# Patient Record
Sex: FEMALE | Race: WHITE | NOT HISPANIC OR LATINO | URBAN - METROPOLITAN AREA
[De-identification: names, ages, dates, MRNs, and addresses within clinical notes are randomized per-mention and may not be internally consistent; named-entity substitution may affect disease eponyms.]

---

## 2018-05-01 ENCOUNTER — EMERGENCY (EMERGENCY)
Facility: HOSPITAL | Age: 83
LOS: 1 days | Discharge: ANOTHER HOSPITAL | End: 2018-05-01
Attending: EMERGENCY MEDICINE
Payer: MEDICARE

## 2018-05-01 VITALS
RESPIRATION RATE: 18 BRPM | OXYGEN SATURATION: 96 % | TEMPERATURE: 98 F | HEART RATE: 68 BPM | WEIGHT: 130.07 LBS | HEIGHT: 64 IN | DIASTOLIC BLOOD PRESSURE: 84 MMHG | SYSTOLIC BLOOD PRESSURE: 160 MMHG

## 2018-05-01 VITALS
HEART RATE: 81 BPM | OXYGEN SATURATION: 95 % | DIASTOLIC BLOOD PRESSURE: 68 MMHG | RESPIRATION RATE: 19 BRPM | TEMPERATURE: 98 F | SYSTOLIC BLOOD PRESSURE: 184 MMHG

## 2018-05-01 LAB
ALBUMIN SERPL ELPH-MCNC: 3.2 G/DL — LOW (ref 3.5–5)
ALP SERPL-CCNC: 51 U/L — SIGNIFICANT CHANGE UP (ref 40–120)
ALT FLD-CCNC: 19 U/L DA — SIGNIFICANT CHANGE UP (ref 10–60)
ANION GAP SERPL CALC-SCNC: 9 MMOL/L — SIGNIFICANT CHANGE UP (ref 5–17)
AST SERPL-CCNC: 26 U/L — SIGNIFICANT CHANGE UP (ref 10–40)
BASOPHILS # BLD AUTO: 0 K/UL — SIGNIFICANT CHANGE UP (ref 0–0.2)
BASOPHILS NFR BLD AUTO: 0.9 % — SIGNIFICANT CHANGE UP (ref 0–2)
BILIRUB SERPL-MCNC: 0.6 MG/DL — SIGNIFICANT CHANGE UP (ref 0.2–1.2)
BUN SERPL-MCNC: 21 MG/DL — HIGH (ref 7–18)
CALCIUM SERPL-MCNC: 8.5 MG/DL — SIGNIFICANT CHANGE UP (ref 8.4–10.5)
CHLORIDE SERPL-SCNC: 109 MMOL/L — HIGH (ref 96–108)
CO2 SERPL-SCNC: 27 MMOL/L — SIGNIFICANT CHANGE UP (ref 22–31)
CREAT SERPL-MCNC: 0.96 MG/DL — SIGNIFICANT CHANGE UP (ref 0.5–1.3)
D DIMER BLD IA.RAPID-MCNC: 461 NG/ML DDU — HIGH
EOSINOPHIL # BLD AUTO: 0 K/UL — SIGNIFICANT CHANGE UP (ref 0–0.5)
EOSINOPHIL NFR BLD AUTO: 0.9 % — SIGNIFICANT CHANGE UP (ref 0–6)
GLUCOSE SERPL-MCNC: 94 MG/DL — SIGNIFICANT CHANGE UP (ref 70–99)
HCT VFR BLD CALC: 39.2 % — SIGNIFICANT CHANGE UP (ref 34.5–45)
HGB BLD-MCNC: 12 G/DL — SIGNIFICANT CHANGE UP (ref 11.5–15.5)
LYMPHOCYTES # BLD AUTO: 1.5 K/UL — SIGNIFICANT CHANGE UP (ref 1–3.3)
LYMPHOCYTES # BLD AUTO: 25.8 % — SIGNIFICANT CHANGE UP (ref 13–44)
MCHC RBC-ENTMCNC: 28.1 PG — SIGNIFICANT CHANGE UP (ref 27–34)
MCHC RBC-ENTMCNC: 30.7 GM/DL — LOW (ref 32–36)
MCV RBC AUTO: 91.5 FL — SIGNIFICANT CHANGE UP (ref 80–100)
MONOCYTES # BLD AUTO: 0.4 K/UL — SIGNIFICANT CHANGE UP (ref 0–0.9)
MONOCYTES NFR BLD AUTO: 6.9 % — SIGNIFICANT CHANGE UP (ref 2–14)
NEUTROPHILS # BLD AUTO: 3.7 K/UL — SIGNIFICANT CHANGE UP (ref 1.8–7.4)
NEUTROPHILS NFR BLD AUTO: 65.6 % — SIGNIFICANT CHANGE UP (ref 43–77)
PLATELET # BLD AUTO: 112 K/UL — LOW (ref 150–400)
POTASSIUM SERPL-MCNC: 3.7 MMOL/L — SIGNIFICANT CHANGE UP (ref 3.5–5.3)
POTASSIUM SERPL-SCNC: 3.7 MMOL/L — SIGNIFICANT CHANGE UP (ref 3.5–5.3)
PROT SERPL-MCNC: 6.7 G/DL — SIGNIFICANT CHANGE UP (ref 6–8.3)
RBC # BLD: 4.28 M/UL — SIGNIFICANT CHANGE UP (ref 3.8–5.2)
RBC # FLD: 13.3 % — SIGNIFICANT CHANGE UP (ref 10.3–14.5)
SODIUM SERPL-SCNC: 145 MMOL/L — SIGNIFICANT CHANGE UP (ref 135–145)
TROPONIN I SERPL-MCNC: 0.29 NG/ML — HIGH (ref 0–0.04)
WBC # BLD: 5.6 K/UL — SIGNIFICANT CHANGE UP (ref 3.8–10.5)
WBC # FLD AUTO: 5.6 K/UL — SIGNIFICANT CHANGE UP (ref 3.8–10.5)

## 2018-05-01 PROCEDURE — 85027 COMPLETE CBC AUTOMATED: CPT

## 2018-05-01 PROCEDURE — 71275 CT ANGIOGRAPHY CHEST: CPT | Mod: 26

## 2018-05-01 PROCEDURE — 84484 ASSAY OF TROPONIN QUANT: CPT

## 2018-05-01 PROCEDURE — 99285 EMERGENCY DEPT VISIT HI MDM: CPT

## 2018-05-01 PROCEDURE — 85379 FIBRIN DEGRADATION QUANT: CPT

## 2018-05-01 PROCEDURE — 80053 COMPREHEN METABOLIC PANEL: CPT

## 2018-05-01 PROCEDURE — 96372 THER/PROPH/DIAG INJ SC/IM: CPT

## 2018-05-01 PROCEDURE — 99285 EMERGENCY DEPT VISIT HI MDM: CPT | Mod: 25

## 2018-05-01 PROCEDURE — 71275 CT ANGIOGRAPHY CHEST: CPT

## 2018-05-01 PROCEDURE — 93005 ELECTROCARDIOGRAM TRACING: CPT

## 2018-05-01 RX ORDER — ENOXAPARIN SODIUM 100 MG/ML
60 INJECTION SUBCUTANEOUS ONCE
Qty: 0 | Refills: 0 | Status: COMPLETED | OUTPATIENT
Start: 2018-05-01 | End: 2018-05-01

## 2018-05-01 RX ORDER — ASPIRIN/CALCIUM CARB/MAGNESIUM 324 MG
81 TABLET ORAL DAILY
Qty: 0 | Refills: 0 | Status: DISCONTINUED | OUTPATIENT
Start: 2018-05-01 | End: 2018-05-05

## 2018-05-01 RX ADMIN — Medication 81 MILLIGRAM(S): at 20:47

## 2018-05-01 RX ADMIN — ENOXAPARIN SODIUM 60 MILLIGRAM(S): 100 INJECTION SUBCUTANEOUS at 21:33

## 2018-05-01 NOTE — ED PROVIDER NOTE - OBJECTIVE STATEMENT
89 y/o F pt w/ PMHx of HTN, aortic stenosis, viral regurgitation and no significant PSHx presents to ED c/o an episode of chest pain which radiated to the back w/ nausea that lasted about a half an hour. Pt states that her sx occurred when she was on a plane coming home. Pt notes that her sx resolved spontaneously w/ oxygen and aspirin. Pt denies SOB or any other complaints. NKDA.

## 2018-05-01 NOTE — ED PROVIDER NOTE - CARE PLAN
Principal Discharge DX:	Non-ST elevation myocardial infarction (NSTEMI), subendocardial infarction, initial episode of care

## 2018-05-01 NOTE — ED PROVIDER NOTE - TOBACCO USE
Subjective:       Patient ID: Diana Sellers is a 56 y.o. female.    Chief Complaint: No chief complaint on file.    Diagnosis:  Lung nodule     Pre-operative therapy: N/A    Procedure(s) and date(s): 11/30/17- Left VATS for left upper lobe wedge resection     Pathology:  1. Left upper lobe wedge-   Negative for neoplasm  Necrotic mass with surrounding granulomatous and giant cell inflammation  No fungus (GMS stain)  No definite acid fast organisms (AFB stain)  Correlate with concurrently submitted microbiology cultures  2. Level V LN  Benign reactive lymph node     Post-operative therapy: N/As    HPI     56 year old female returns for follow up s/p left VATS left upper lobe wedge biopsy. Post-op course unremarkable. Pathology benign. Today she reports feeling well. Pain well controlled with NSAIDS. She is working on increasing exercise endurance. Denies fever, chills. SOB, cough, CP, N/V or changes in bowel and bladder functioning.     Review of Systems   Constitutional: Positive for fatigue. Negative for activity change, appetite change and fever.   HENT: Negative.  Negative for trouble swallowing and voice change.    Eyes: Negative.    Respiratory: Positive for cough. Negative for chest tightness, shortness of breath and wheezing.    Cardiovascular: Negative.    Gastrointestinal: Negative.    Endocrine: Negative.    Genitourinary: Negative.    Musculoskeletal: Negative.    Skin: Negative.    Allergic/Immunologic: Negative.    Neurological: Negative.    Hematological: Negative.    Psychiatric/Behavioral: Negative.            Objective:      Physical Exam   Constitutional: She is oriented to person, place, and time. She appears well-developed and well-nourished.   HENT:   Head: Normocephalic and atraumatic.   Mouth/Throat: Oropharynx is clear and moist.   Eyes: EOM are normal. Pupils are equal, round, and reactive to light.   Neck: Normal range of motion. Neck supple. No tracheal deviation present.    Cardiovascular: Normal rate, regular rhythm, normal heart sounds and intact distal pulses.    Pulmonary/Chest: Effort normal and breath sounds normal. She exhibits no tenderness.   Incisions well healed. Chest tube site with mild erythema, no drainage.    Abdominal: Soft. Bowel sounds are normal. She exhibits no distension.   Musculoskeletal: Normal range of motion. She exhibits no edema.   Lymphadenopathy:     She has no cervical adenopathy.   Neurological: She is alert and oriented to person, place, and time.   Skin: Skin is warm and dry.   Psychiatric: She has a normal mood and affect.   Vitals reviewed.        CXR-   Reviewed. Anterior air fluid level stable.    Assessment:       56 year old female returns for follow up s/p left VATS left upper lobe wedge biopsy.     Plan:       RTC in 6 months with noncontrast chest CT for surveillance. If stable at that time, she will not require any further follow up.     ATTENDING ATTESTATION:    I evaluated the patient and I agree with the assessment and plan   Never smoker

## 2018-05-02 ENCOUNTER — INPATIENT (INPATIENT)
Facility: HOSPITAL | Age: 83
LOS: 2 days | Discharge: HOME HEALTH | DRG: 273 | End: 2018-05-05
Attending: INTERNAL MEDICINE | Admitting: INTERNAL MEDICINE
Payer: MEDICARE

## 2018-05-02 VITALS
RESPIRATION RATE: 20 BRPM | OXYGEN SATURATION: 100 % | DIASTOLIC BLOOD PRESSURE: 88 MMHG | HEIGHT: 60 IN | WEIGHT: 133.6 LBS | TEMPERATURE: 98 F | HEART RATE: 74 BPM | SYSTOLIC BLOOD PRESSURE: 191 MMHG

## 2018-05-02 DIAGNOSIS — F41.9 ANXIETY DISORDER, UNSPECIFIED: ICD-10-CM

## 2018-05-02 DIAGNOSIS — Z90.710 ACQUIRED ABSENCE OF BOTH CERVIX AND UTERUS: Chronic | ICD-10-CM

## 2018-05-02 DIAGNOSIS — I25.10 ATHEROSCLEROTIC HEART DISEASE OF NATIVE CORONARY ARTERY WITHOUT ANGINA PECTORIS: ICD-10-CM

## 2018-05-02 DIAGNOSIS — Z22.322 CARRIER OR SUSPECTED CARRIER OF METHICILLIN RESISTANT STAPHYLOCOCCUS AUREUS: ICD-10-CM

## 2018-05-02 DIAGNOSIS — K59.00 CONSTIPATION, UNSPECIFIED: ICD-10-CM

## 2018-05-02 DIAGNOSIS — I21.4 NON-ST ELEVATION (NSTEMI) MYOCARDIAL INFARCTION: ICD-10-CM

## 2018-05-02 DIAGNOSIS — I35.0 NONRHEUMATIC AORTIC (VALVE) STENOSIS: ICD-10-CM

## 2018-05-02 DIAGNOSIS — R63.8 OTHER SYMPTOMS AND SIGNS CONCERNING FOOD AND FLUID INTAKE: ICD-10-CM

## 2018-05-02 DIAGNOSIS — E03.9 HYPOTHYROIDISM, UNSPECIFIED: ICD-10-CM

## 2018-05-02 DIAGNOSIS — Z95.5 PRESENCE OF CORONARY ANGIOPLASTY IMPLANT AND GRAFT: Chronic | ICD-10-CM

## 2018-05-02 DIAGNOSIS — Z98.890 OTHER SPECIFIED POSTPROCEDURAL STATES: Chronic | ICD-10-CM

## 2018-05-02 DIAGNOSIS — K50.90 CROHN'S DISEASE, UNSPECIFIED, WITHOUT COMPLICATIONS: ICD-10-CM

## 2018-05-02 LAB
ALBUMIN SERPL ELPH-MCNC: 3.8 G/DL — SIGNIFICANT CHANGE UP (ref 3.3–5)
ALP SERPL-CCNC: 46 U/L — SIGNIFICANT CHANGE UP (ref 40–120)
ALT FLD-CCNC: 15 U/L — SIGNIFICANT CHANGE UP (ref 10–45)
ANION GAP SERPL CALC-SCNC: 11 MMOL/L — SIGNIFICANT CHANGE UP (ref 5–17)
ANION GAP SERPL CALC-SCNC: 12 MMOL/L — SIGNIFICANT CHANGE UP (ref 5–17)
APTT BLD: 195.6 SEC — CRITICAL HIGH (ref 27.5–37.4)
APTT BLD: 43.7 SEC — HIGH (ref 27.5–37.4)
APTT BLD: 78.7 SEC — HIGH (ref 27.5–37.4)
APTT BLD: 94.6 SEC — HIGH (ref 27.5–37.4)
AST SERPL-CCNC: 26 U/L — SIGNIFICANT CHANGE UP (ref 10–40)
BILIRUB SERPL-MCNC: 0.7 MG/DL — SIGNIFICANT CHANGE UP (ref 0.2–1.2)
BLD GP AB SCN SERPL QL: NEGATIVE — SIGNIFICANT CHANGE UP
BUN SERPL-MCNC: 13 MG/DL — SIGNIFICANT CHANGE UP (ref 7–23)
BUN SERPL-MCNC: 17 MG/DL — SIGNIFICANT CHANGE UP (ref 7–23)
CALCIUM SERPL-MCNC: 9 MG/DL — SIGNIFICANT CHANGE UP (ref 8.4–10.5)
CALCIUM SERPL-MCNC: 9.1 MG/DL — SIGNIFICANT CHANGE UP (ref 8.4–10.5)
CHLORIDE SERPL-SCNC: 100 MMOL/L — SIGNIFICANT CHANGE UP (ref 96–108)
CHLORIDE SERPL-SCNC: 101 MMOL/L — SIGNIFICANT CHANGE UP (ref 96–108)
CHOLEST SERPL-MCNC: 142 MG/DL — SIGNIFICANT CHANGE UP (ref 10–199)
CK MB CFR SERPL CALC: 3.7 NG/ML — SIGNIFICANT CHANGE UP (ref 0–6.7)
CO2 SERPL-SCNC: 25 MMOL/L — SIGNIFICANT CHANGE UP (ref 22–31)
CO2 SERPL-SCNC: 27 MMOL/L — SIGNIFICANT CHANGE UP (ref 22–31)
CREAT SERPL-MCNC: 0.84 MG/DL — SIGNIFICANT CHANGE UP (ref 0.5–1.3)
CREAT SERPL-MCNC: 0.9 MG/DL — SIGNIFICANT CHANGE UP (ref 0.5–1.3)
GLUCOSE SERPL-MCNC: 87 MG/DL — SIGNIFICANT CHANGE UP (ref 70–99)
GLUCOSE SERPL-MCNC: 91 MG/DL — SIGNIFICANT CHANGE UP (ref 70–99)
HBA1C BLD-MCNC: 4.9 % — SIGNIFICANT CHANGE UP (ref 4–5.6)
HCT VFR BLD CALC: 37.4 % — SIGNIFICANT CHANGE UP (ref 34.5–45)
HCT VFR BLD CALC: 41.2 % — SIGNIFICANT CHANGE UP (ref 34.5–45)
HDLC SERPL-MCNC: 79 MG/DL — SIGNIFICANT CHANGE UP (ref 40–125)
HGB BLD-MCNC: 12.2 G/DL — SIGNIFICANT CHANGE UP (ref 11.5–15.5)
HGB BLD-MCNC: 13.1 G/DL — SIGNIFICANT CHANGE UP (ref 11.5–15.5)
INR BLD: 1 — SIGNIFICANT CHANGE UP (ref 0.88–1.16)
LIPID PNL WITH DIRECT LDL SERPL: 52 MG/DL — SIGNIFICANT CHANGE UP
MAGNESIUM SERPL-MCNC: 1.8 MG/DL — SIGNIFICANT CHANGE UP (ref 1.6–2.6)
MAGNESIUM SERPL-MCNC: 1.9 MG/DL — SIGNIFICANT CHANGE UP (ref 1.6–2.6)
MCHC RBC-ENTMCNC: 28.7 PG — SIGNIFICANT CHANGE UP (ref 27–34)
MCHC RBC-ENTMCNC: 29.1 PG — SIGNIFICANT CHANGE UP (ref 27–34)
MCHC RBC-ENTMCNC: 31.8 G/DL — LOW (ref 32–36)
MCHC RBC-ENTMCNC: 32.6 G/DL — SIGNIFICANT CHANGE UP (ref 32–36)
MCV RBC AUTO: 89.3 FL — SIGNIFICANT CHANGE UP (ref 80–100)
MCV RBC AUTO: 90.2 FL — SIGNIFICANT CHANGE UP (ref 80–100)
PHOSPHATE SERPL-MCNC: 3.5 MG/DL — SIGNIFICANT CHANGE UP (ref 2.5–4.5)
PLATELET # BLD AUTO: 116 K/UL — LOW (ref 150–400)
PLATELET # BLD AUTO: 136 K/UL — LOW (ref 150–400)
POTASSIUM SERPL-MCNC: 3.6 MMOL/L — SIGNIFICANT CHANGE UP (ref 3.5–5.3)
POTASSIUM SERPL-MCNC: 4 MMOL/L — SIGNIFICANT CHANGE UP (ref 3.5–5.3)
POTASSIUM SERPL-SCNC: 3.6 MMOL/L — SIGNIFICANT CHANGE UP (ref 3.5–5.3)
POTASSIUM SERPL-SCNC: 4 MMOL/L — SIGNIFICANT CHANGE UP (ref 3.5–5.3)
PROT SERPL-MCNC: 7.2 G/DL — SIGNIFICANT CHANGE UP (ref 6–8.3)
PROTHROM AB SERPL-ACNC: 11.1 SEC — SIGNIFICANT CHANGE UP (ref 9.8–12.7)
RBC # BLD: 4.19 M/UL — SIGNIFICANT CHANGE UP (ref 3.8–5.2)
RBC # BLD: 4.57 M/UL — SIGNIFICANT CHANGE UP (ref 3.8–5.2)
RBC # FLD: 14 % — SIGNIFICANT CHANGE UP (ref 10.3–16.9)
RBC # FLD: 14 % — SIGNIFICANT CHANGE UP (ref 10.3–16.9)
RH IG SCN BLD-IMP: NEGATIVE — SIGNIFICANT CHANGE UP
SODIUM SERPL-SCNC: 137 MMOL/L — SIGNIFICANT CHANGE UP (ref 135–145)
SODIUM SERPL-SCNC: 139 MMOL/L — SIGNIFICANT CHANGE UP (ref 135–145)
TOTAL CHOLESTEROL/HDL RATIO MEASUREMENT: 1.8 RATIO — LOW (ref 3.3–7.1)
TRIGL SERPL-MCNC: 57 MG/DL — SIGNIFICANT CHANGE UP (ref 10–149)
TROPONIN T SERPL-MCNC: 0.01 NG/ML — SIGNIFICANT CHANGE UP (ref 0–0.01)
TROPONIN T SERPL-MCNC: 0.02 NG/ML — HIGH (ref 0–0.01)
TSH SERPL-MCNC: 0.2 UIU/ML — LOW (ref 0.35–4.94)
WBC # BLD: 6.8 K/UL — SIGNIFICANT CHANGE UP (ref 3.8–10.5)
WBC # BLD: 8.8 K/UL — SIGNIFICANT CHANGE UP (ref 3.8–10.5)
WBC # FLD AUTO: 6.8 K/UL — SIGNIFICANT CHANGE UP (ref 3.8–10.5)
WBC # FLD AUTO: 8.8 K/UL — SIGNIFICANT CHANGE UP (ref 3.8–10.5)

## 2018-05-02 PROCEDURE — 99223 1ST HOSP IP/OBS HIGH 75: CPT | Mod: 57

## 2018-05-02 PROCEDURE — 93291 INTERROG DEV EVAL SCRMS IP: CPT | Mod: 26

## 2018-05-02 PROCEDURE — 99291 CRITICAL CARE FIRST HOUR: CPT

## 2018-05-02 PROCEDURE — 71045 X-RAY EXAM CHEST 1 VIEW: CPT | Mod: 26

## 2018-05-02 PROCEDURE — 93306 TTE W/DOPPLER COMPLETE: CPT | Mod: 26

## 2018-05-02 PROCEDURE — 93880 EXTRACRANIAL BILAT STUDY: CPT | Mod: 26

## 2018-05-02 PROCEDURE — 93010 ELECTROCARDIOGRAM REPORT: CPT

## 2018-05-02 PROCEDURE — 99223 1ST HOSP IP/OBS HIGH 75: CPT

## 2018-05-02 RX ORDER — ADALIMUMAB 40MG/0.8ML
40 KIT SUBCUTANEOUS ONCE
Qty: 0 | Refills: 0 | Status: DISCONTINUED | OUTPATIENT
Start: 2018-05-02 | End: 2018-05-02

## 2018-05-02 RX ORDER — HEPARIN SODIUM 5000 [USP'U]/ML
1100 INJECTION INTRAVENOUS; SUBCUTANEOUS
Qty: 25000 | Refills: 0 | Status: DISCONTINUED | OUTPATIENT
Start: 2018-05-02 | End: 2018-05-02

## 2018-05-02 RX ORDER — ALPRAZOLAM 0.25 MG
0.12 TABLET ORAL EVERY 8 HOURS
Qty: 0 | Refills: 0 | Status: DISCONTINUED | OUTPATIENT
Start: 2018-05-02 | End: 2018-05-02

## 2018-05-02 RX ORDER — TICAGRELOR 90 MG/1
180 TABLET ORAL ONCE
Qty: 0 | Refills: 0 | Status: COMPLETED | OUTPATIENT
Start: 2018-05-02 | End: 2018-05-02

## 2018-05-02 RX ORDER — METOPROLOL TARTRATE 50 MG
12.5 TABLET ORAL EVERY 12 HOURS
Qty: 0 | Refills: 0 | Status: DISCONTINUED | OUTPATIENT
Start: 2018-05-02 | End: 2018-05-03

## 2018-05-02 RX ORDER — ATORVASTATIN CALCIUM 80 MG/1
80 TABLET, FILM COATED ORAL AT BEDTIME
Qty: 0 | Refills: 0 | Status: DISCONTINUED | OUTPATIENT
Start: 2018-05-02 | End: 2018-05-05

## 2018-05-02 RX ORDER — CHLORHEXIDINE GLUCONATE 213 G/1000ML
1 SOLUTION TOPICAL DAILY
Qty: 0 | Refills: 0 | Status: DISCONTINUED | OUTPATIENT
Start: 2018-05-02 | End: 2018-05-04

## 2018-05-02 RX ORDER — HYDRALAZINE HCL 50 MG
25 TABLET ORAL ONCE
Qty: 0 | Refills: 0 | Status: DISCONTINUED | OUTPATIENT
Start: 2018-05-02 | End: 2018-05-02

## 2018-05-02 RX ORDER — LEVOTHYROXINE SODIUM 125 MCG
88 TABLET ORAL DAILY
Qty: 0 | Refills: 0 | Status: DISCONTINUED | OUTPATIENT
Start: 2018-05-02 | End: 2018-05-05

## 2018-05-02 RX ORDER — PANTOPRAZOLE SODIUM 20 MG/1
40 TABLET, DELAYED RELEASE ORAL
Qty: 0 | Refills: 0 | Status: DISCONTINUED | OUTPATIENT
Start: 2018-05-02 | End: 2018-05-05

## 2018-05-02 RX ORDER — TICAGRELOR 90 MG/1
90 TABLET ORAL
Qty: 0 | Refills: 0 | Status: DISCONTINUED | OUTPATIENT
Start: 2018-05-02 | End: 2018-05-04

## 2018-05-02 RX ORDER — HEPARIN SODIUM 5000 [USP'U]/ML
700 INJECTION INTRAVENOUS; SUBCUTANEOUS
Qty: 25000 | Refills: 0 | Status: DISCONTINUED | OUTPATIENT
Start: 2018-05-02 | End: 2018-05-03

## 2018-05-02 RX ORDER — POLYETHYLENE GLYCOL 3350 17 G/17G
17 POWDER, FOR SOLUTION ORAL DAILY
Qty: 0 | Refills: 0 | Status: DISCONTINUED | OUTPATIENT
Start: 2018-05-02 | End: 2018-05-05

## 2018-05-02 RX ORDER — POTASSIUM CHLORIDE 20 MEQ
40 PACKET (EA) ORAL ONCE
Qty: 0 | Refills: 0 | Status: COMPLETED | OUTPATIENT
Start: 2018-05-02 | End: 2018-05-02

## 2018-05-02 RX ORDER — LANOLIN ALCOHOL/MO/W.PET/CERES
3 CREAM (GRAM) TOPICAL AT BEDTIME
Qty: 0 | Refills: 0 | Status: DISCONTINUED | OUTPATIENT
Start: 2018-05-02 | End: 2018-05-03

## 2018-05-02 RX ORDER — CHLORHEXIDINE GLUCONATE 213 G/1000ML
1 SOLUTION TOPICAL ONCE
Qty: 0 | Refills: 0 | Status: COMPLETED | OUTPATIENT
Start: 2018-05-02 | End: 2018-05-03

## 2018-05-02 RX ORDER — MINOCYCLINE HYDROCHLORIDE 45 MG/1
100 TABLET, EXTENDED RELEASE ORAL DAILY
Qty: 0 | Refills: 0 | Status: DISCONTINUED | OUTPATIENT
Start: 2018-05-02 | End: 2018-05-05

## 2018-05-02 RX ORDER — CHLORHEXIDINE GLUCONATE 213 G/1000ML
5 SOLUTION TOPICAL ONCE
Qty: 0 | Refills: 0 | Status: COMPLETED | OUTPATIENT
Start: 2018-05-02 | End: 2018-05-03

## 2018-05-02 RX ORDER — MAGNESIUM SULFATE 500 MG/ML
1 VIAL (ML) INJECTION ONCE
Qty: 0 | Refills: 0 | Status: COMPLETED | OUTPATIENT
Start: 2018-05-02 | End: 2018-05-02

## 2018-05-02 RX ORDER — ASPIRIN/CALCIUM CARB/MAGNESIUM 324 MG
81 TABLET ORAL DAILY
Qty: 0 | Refills: 0 | Status: DISCONTINUED | OUTPATIENT
Start: 2018-05-02 | End: 2018-05-05

## 2018-05-02 RX ORDER — HEPARIN SODIUM 5000 [USP'U]/ML
800 INJECTION INTRAVENOUS; SUBCUTANEOUS
Qty: 25000 | Refills: 0 | Status: DISCONTINUED | OUTPATIENT
Start: 2018-05-02 | End: 2018-05-02

## 2018-05-02 RX ORDER — MAGNESIUM SULFATE 500 MG/ML
1 VIAL (ML) INJECTION ONCE
Qty: 0 | Refills: 0 | Status: DISCONTINUED | OUTPATIENT
Start: 2018-05-02 | End: 2018-05-02

## 2018-05-02 RX ADMIN — HEPARIN SODIUM 11 UNIT(S)/HR: 5000 INJECTION INTRAVENOUS; SUBCUTANEOUS at 08:01

## 2018-05-02 RX ADMIN — ATORVASTATIN CALCIUM 80 MILLIGRAM(S): 80 TABLET, FILM COATED ORAL at 22:17

## 2018-05-02 RX ADMIN — Medication 81 MILLIGRAM(S): at 12:06

## 2018-05-02 RX ADMIN — Medication 100 GRAM(S): at 11:32

## 2018-05-02 RX ADMIN — HEPARIN SODIUM 8 UNIT(S)/HR: 5000 INJECTION INTRAVENOUS; SUBCUTANEOUS at 19:01

## 2018-05-02 RX ADMIN — Medication 12.5 MILLIGRAM(S): at 14:47

## 2018-05-02 RX ADMIN — Medication 0.12 MILLIGRAM(S): at 22:17

## 2018-05-02 RX ADMIN — Medication 0.12 MILLIGRAM(S): at 18:43

## 2018-05-02 RX ADMIN — Medication 88 MICROGRAM(S): at 07:20

## 2018-05-02 RX ADMIN — Medication 40 MILLIEQUIVALENT(S): at 08:21

## 2018-05-02 RX ADMIN — TICAGRELOR 180 MILLIGRAM(S): 90 TABLET ORAL at 03:30

## 2018-05-02 RX ADMIN — Medication 3 MILLIGRAM(S): at 22:17

## 2018-05-02 RX ADMIN — MINOCYCLINE HYDROCHLORIDE 100 MILLIGRAM(S): 45 TABLET, EXTENDED RELEASE ORAL at 12:06

## 2018-05-02 RX ADMIN — PANTOPRAZOLE SODIUM 40 MILLIGRAM(S): 20 TABLET, DELAYED RELEASE ORAL at 07:25

## 2018-05-02 RX ADMIN — Medication 12.5 MILLIGRAM(S): at 01:30

## 2018-05-02 RX ADMIN — Medication 1 TABLET(S): at 12:06

## 2018-05-02 RX ADMIN — TICAGRELOR 90 MILLIGRAM(S): 90 TABLET ORAL at 18:43

## 2018-05-02 RX ADMIN — Medication 0.12 MILLIGRAM(S): at 12:06

## 2018-05-02 NOTE — H&P ADULT - PROBLEM SELECTOR PLAN 2
s/p MAIA x3, last placed in [place] in 2012 c/b GI bleed presumably from Plavix, which was discontinued after a year. Followed by Dr. Luis Hickey in [place]   -started ASA 81mg qd, Brilinta 90mg BID, and Lipitor 80mg  -obtain collateral from Dr. Hickey [phone]

## 2018-05-02 NOTE — PROGRESS NOTE ADULT - ASSESSMENT
Assessment/Plan/Risk Stratification:  I evaluated and examined this patient and all pre-operative studies. I concur with the first surgeon that this patient's risk assessment for standard surgical AVR is EXTREME/HIGH/INTERMEDIATE and should be considered for TAVR secondary to:

## 2018-05-02 NOTE — CONSULT NOTE ADULT - ASSESSMENT
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   Patient was flying back to [place] from [place], when she reported left flank pain with fullness in her abdomen  associated w/nausea that lasted about 30 minutes. Pt was put on 2L NC and PIV placed in flight. Pt noted that her symptoms resolved w/oxygen, IVF, and aspirin 250mg. 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   Patient was flying back to [place] from [place], when she reported left flank pain with fullness in her abdomen  associated w/nausea that lasted about 30 minutes. Pt was put on 2L NC and PIV placed in flight. Pt noted that her symptoms resolved w/oxygen, IVF, and aspirin 250mg.     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   Patient was flying back to [place] from [place], when she reported left flank pain with fullness in her abdomen  associated w/nausea that lasted about 30 minutes. Pt was put on 2L NC and PIV placed in flight. Pt noted that her symptoms resolved w/oxygen, IVF, and aspirin 250mg.     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   Patient was flying back to [place] from [place], when she reported left flank pain with fullness in her abdomen  associated w/nausea that lasted about 30 minutes. Pt was put on 2L NC and PIV placed in flight. Pt noted that her symptoms resolved w/oxygen, IVF, and aspirin 250mg. Patient was given aspirin and lovenox at [facility] and CT chest was negative for PE. Patient was transferred to [facility] for management of NSTEMI where she was given Brillinta 180mg, TTE showed AS.   SHD team was consulted to evaluate for possible TAVR/BAV      Plan  AS  plan for [facility], possible PCI and BAV 5/3/18  need to complete workup-carotid duplex, PFT's, Structural CT  NPO at midnight

## 2018-05-02 NOTE — CONSULT NOTE ADULT - SUBJECTIVE AND OBJECTIVE BOX
Surgeon:Dr. Gilbert Humphrey    Requesting Physician: Dr. 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 ED c/o acute onset chest pain. History was supplemented by son a medical doctor and was at bedside. Patient was flying back to [place] from [place], when she reported left flank pain with fullness in her abdomen  associated w/nausea that lasted about 30 minutes. Pt was put on 2L NC and PIV placed in flight. Pt noted that her symptoms resolved w/oxygen, IVF, and aspirin 250mg.   Pt denied SOB, productive cough, palpitations, recent illness, hx blood clots, f/c/v/d, abdominal pain, urinary symptoms, change in medications, HA, dizziness, changes in vision. Patient was given aspirin and lovenox at [facility] and CT chest was negative for PE. Patient was transferred to [facility] for management of NSTEMI where she was given Brillinta 180mg, TTE showed AS.   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  Crohns disease  CAD (coronary artery disease)  Aortic stenosis  H/O total hysterectomy  History of bowel resection  S/P drug eluting coronary stent placement      MEDICATIONS  (STANDING):  ALPRAZolam 0.125 milliGRAM(s) Oral every 8 hours  aspirin  chewable 81 milliGRAM(s) Oral daily  atorvastatin 80 milliGRAM(s) Oral at bedtime  chlorhexidine 0.12% Liquid 5 milliLiter(s) Swish and Spit once  chlorhexidine 2% Cloths 1 Application(s) Topical daily  chlorhexidine 4% Liquid 1 Application(s) Topical once  heparin  Infusion 800 Unit(s)/Hr (8 mL/Hr) IV Continuous <Continuous>  levothyroxine 88 MICROGram(s) Oral daily  melatonin 3 milliGRAM(s) Oral at bedtime  metoprolol tartrate 12.5 milliGRAM(s) Oral every 12 hours  minocycline 100 milliGRAM(s) Oral daily  multivitamin 1 Tablet(s) Oral daily  pantoprazole    Tablet 40 milliGRAM(s) Oral before breakfast  polyethylene glycol 3350 17 Gram(s) Oral daily  ticagrelor 90 milliGRAM(s) Oral two times a day    MEDICATIONS  (PRN):      Allergies    No Known Allergies    Intolerances        SOCIAL HISTORY:  Smoker:   NO        PACK YEARS:                         WHEN QUIT?  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[employer])  Live with: Assisted living center      FAMILY HISTORY:  No pertinent family history in first degree relatives      CONSTITUTIONAL:  Fevers / chills, sweats, fatigue, weight loss, weight gain                                    NEGATIVE  NEURO:  parathesias, seizures, confusion                                                      NEGATIVE  / POSITIVE Syncope (1 mth ago), Dizziness on ambulation  EYES:  Blurry vision, discharge, pain, loss of vision                                                                         NEGATIVE  ENMT:  Difficulty hearing, vertigo, dysphagia, epistaxis, recent dental work                                     NEGATIVE  CV:  Chest pain, palpitations, DELVALLE, orthopnea                                                                                    NEGATIVE  RESPIRATORY:  Wheezing, SOB, cough / sputum, hemoptysis                                                              NEGATIVE  GI:  Nausea, vomiting, diarrhea, constipation, melena                                                                        NEGATIVE  : Hematuria, dysuria, urgency, incontinence                                                                                       NEGATIVE  MUSKULOSKELETAL:  arthritis, joint swelling, muscle weakness                                                           NEGATIVE  SKIN/BREAST:  rash, itching, hair loss, masses                                                                                            NEGATIVE  PSYCH:  depression, anxiety, suicidal ideation                                                                                             NEGATIVE  HEME/LYMPH:  bruises easily, enlarged lymph nodes, tender lymph nodes                                        NEGATIVE  ENDOCRINE:  cold intolerance, heat intolerance, polydipsia                                                                   NEGATIVE      PHYSICAL EXAM  Vital Signs Last 24 Hrs  T(C): 36.8 (02 May 2018 17:04), Max: 36.8 (02 May 2018 00:17)  T(F): 98.2 (02 May 2018 17:04), Max: 98.2 (02 May 2018 00:17)  HR: 68 (02 May 2018 20:00) (64 - 82)  BP: 114/57 (02 May 2018 20:00) (114/57 - 198/101)  BP(mean): 85 (02 May 2018 20:00) (85 - 155)  RR: 29 (02 May 2018 20:00) (16 - 29)  SpO2: 98% (02 May 2018 20:00) (95% - 100%)    CONSTITUTIONAL:                                                      NEUROLOGICAL:  Alert and oriented x3, no gross deficits appreciated                   EYES:      PERRL           ENMT:  supple neck, no lymphadenopathy  CARDIOVASCULAR:     RRR, S1S2, 3/6 murmur LSB  RESPIRATORY:  equal breath sounds, no rales, no rhonchi, no wheeze  GASTROINTESTINAL:  soft, nontender, positive bowel sounds  : JENNINGS + / -  negative  MUSKULOSKELETAL:  moves all extremities  SKIN / BREAST:  no rash seen, mild purplish bruising LLE Lateral aspect  EXTREMITIES:  1+ pitting edema  VASCULAR:    all pulses  intact (radial, femoral, DP/PT)  INCISION: healed laparotomy incision                                                                  LABS:                        12.2   8.8   )-----------( 136      ( 02 May 2018 11:42 )             37.4     05-02    137  |  100  |  13  ----------------------------<  91  4.0   |  25  |  0.84    Ca    9.0      02 May 2018 11:42  Phos  3.5     05-02  Mg     1.8     05-02    TPro  7.2  /  Alb  3.8  /  TBili  0.7  /  DBili  x   /  AST  26  /  ALT  15  /  AlkPhos  46  05-02    PT/INR - ( 02 May 2018 01:18 )   PT: 11.1 sec;   INR: 1.00          PTT - ( 02 May 2018 17:48 )  PTT:78.7 sec    CARDIAC MARKERS ( 02 May 2018 11:42 )  x     / 0.01 ng/mL / x     / x     / x      CARDIAC MARKERS ( 02 May 2018 01:18 )  x     / 0.02 ng/mL / 47 U/L / x     / 3.7 ng/mL  CARDIAC MARKERS ( 01 May 2018 19:33 )  0.293 ng/mL / x     / x     / x     / x            Thyroid Stimulating Hormone, Serum: 0.205 uIU/mL (05-02 @ 01:18)  Hemoglobin A1C, Whole Blood: 4.9 % (05-02 @ 01:16)    Serum Pro-Brain Natriuretic Peptide: 5694 pg/mL (05-02-18 @ 01:18)      RADIOLOGY & ADDITIONAL STUDIES:  CAROTID U/S:ordered    CXR:  < from: Xray Chest 1 View- PORTABLE-Urgent (05.02.18 @ 02:03) >  History: Chest pain shortness of breath    Mild hypoinflation. Prominent size heart. No focal lung infiltrate or   pleural effusion. Aortic arch vascular calcification.    < end of copied text >    CT Scan: < from: CT Angio Chest w/ IV Cont (05.01.18 @ 23:05) >  IMPRESSION:  1. No evidence of a pulmonary embolism.  2. Interstitial pulmonary edema and trace bilateral pleural effusions.  3. Age-indeterminate T4, T5 and T6 vertebral body compression fracture   deformities.    < end of copied text >      EKG:< from: 12 Lead ECG (05.02.18 @ 06:27) >  Ventricular Rate 67 BPM    Atrial Rate 67 BPM  P-R Interval 140 ms  QRS Duration 96 ms  Q-T Interval 434 ms  QTC Calculation(Bezet) 458 ms  P Axis 61 degrees  R Axis 4 degrees  T Axis 24 degrees  Diagnosis Line Normal sinus rhythm  Minimal voltage criteria for LVH, may be normal variant    < end of copied text >     YASEMIN:< from: Echocardiogram (05.02.18 @ 13:35) >  Normal left ventricular size and wall thickness.There is a septal "knuckle" with no left ventricular outflow obstructionThe left ventricular wall motion is normal.Right atrial size is normal.  The right ventricle is normal in size and function.The right ventricular systolic function is normal. Calcified valve.No aortic regurgitation noted.There is Severe aortic stenosis.The  calculated aortic valve area using the continuity equation is 0.7 cm2.The mean pressure gradient is 40 mmHg.There is severe mitral annular calcification.There is trace to mild mitral  regurgitation.There is mild mitral stenosis.The mean pressure gradient is 6 mmHg.Structurally normal tricuspid valve.There is trace tricuspid regurgitation.The pulmonary artery   systolic pressure is estimated to be 49 mmHg.No aortic root dilatation.The inferior vena cava is normal in size (<2.1 cm) with normal inspiratory collapse (>50%) consistent with normal right atrial   pressure.  There is no pericardial effusion.    < end of copied text >      Cardiac Cath: Surgeon:Dr. Gilbert Humphery    Requesting Physician: Dr. 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 ED c/o acute onset chest pain. History was supplemented by son a medical doctor and was at bedside. Patient was flying back to [place] from [place], when she reported left flank pain with fullness in her abdomen  associated w/nausea that lasted about 30 minutes. Pt was put on 2L NC and PIV placed in flight. Pt noted that her symptoms resolved w/oxygen, IVF, and aspirin 250mg.   Pt denied SOB, productive cough, palpitations, recent illness, hx blood clots, f/c/v/d, abdominal pain, urinary symptoms, change in medications, HA, dizziness, changes in vision. Patient was given aspirin and lovenox at [facility] and CT chest was negative for PE. Patient was transferred to [facility] for management of NSTEMI where she was given Brillinta 180mg, TTE showed AS.     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  Crohns disease  CAD (coronary artery disease)  Aortic stenosis  H/O total hysterectomy  History of bowel resection  S/P drug eluting coronary stent placement      MEDICATIONS  (STANDING):  ALPRAZolam 0.125 milliGRAM(s) Oral every 8 hours  aspirin  chewable 81 milliGRAM(s) Oral daily  atorvastatin 80 milliGRAM(s) Oral at bedtime  chlorhexidine 0.12% Liquid 5 milliLiter(s) Swish and Spit once  chlorhexidine 2% Cloths 1 Application(s) Topical daily  chlorhexidine 4% Liquid 1 Application(s) Topical once  heparin  Infusion 800 Unit(s)/Hr (8 mL/Hr) IV Continuous <Continuous>  levothyroxine 88 MICROGram(s) Oral daily  melatonin 3 milliGRAM(s) Oral at bedtime  metoprolol tartrate 12.5 milliGRAM(s) Oral every 12 hours  minocycline 100 milliGRAM(s) Oral daily  multivitamin 1 Tablet(s) Oral daily  pantoprazole    Tablet 40 milliGRAM(s) Oral before breakfast  polyethylene glycol 3350 17 Gram(s) Oral daily  ticagrelor 90 milliGRAM(s) Oral two times a day    MEDICATIONS  (PRN):      Allergies    No Known Allergies    Intolerances        SOCIAL HISTORY:  Smoker:   NO        PACK YEARS:                         WHEN QUIT?  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[employer])  Live with: Assisted living center      FAMILY HISTORY:  No pertinent family history in first degree relatives      CONSTITUTIONAL:  Fevers / chills, sweats, fatigue, weight loss, weight gain                                    NEGATIVE  NEURO:  parathesias, seizures, confusion                                                      NEGATIVE  / POSITIVE Syncope (1 mth ago), Dizziness on ambulation  EYES:  Blurry vision, discharge, pain, loss of vision                                                                         NEGATIVE  ENMT:  Difficulty hearing, vertigo, dysphagia, epistaxis, recent dental work                                     NEGATIVE  CV:  Chest pain, palpitations, DELVALLE, orthopnea                                                                                    NEGATIVE  RESPIRATORY:  Wheezing, SOB, cough / sputum, hemoptysis                                                              NEGATIVE  GI:  Nausea, vomiting, diarrhea, constipation, melena                                                                        NEGATIVE  : Hematuria, dysuria, urgency, incontinence                                                                                       NEGATIVE  MUSKULOSKELETAL:  arthritis, joint swelling, muscle weakness                                                           NEGATIVE  SKIN/BREAST:  rash, itching, hair loss, masses                                                                                            NEGATIVE  PSYCH:  depression, anxiety, suicidal ideation                                                                                             NEGATIVE  HEME/LYMPH:  bruises easily, enlarged lymph nodes, tender lymph nodes                                        NEGATIVE  ENDOCRINE:  cold intolerance, heat intolerance, polydipsia                                                                   NEGATIVE      PHYSICAL EXAM  Vital Signs Last 24 Hrs  T(C): 36.8 (02 May 2018 17:04), Max: 36.8 (02 May 2018 00:17)  T(F): 98.2 (02 May 2018 17:04), Max: 98.2 (02 May 2018 00:17)  HR: 68 (02 May 2018 20:00) (64 - 82)  BP: 114/57 (02 May 2018 20:00) (114/57 - 198/101)  BP(mean): 85 (02 May 2018 20:00) (85 - 155)  RR: 29 (02 May 2018 20:00) (16 - 29)  SpO2: 98% (02 May 2018 20:00) (95% - 100%)    CONSTITUTIONAL:                                                      NEUROLOGICAL:  Alert and oriented x3, no gross deficits appreciated                   EYES:      PERRL           ENMT:  supple neck, no lymphadenopathy  CARDIOVASCULAR:     RRR, S1S2, 3/6 murmur LSB  RESPIRATORY:  equal breath sounds, no rales, no rhonchi, no wheeze  GASTROINTESTINAL:  soft, nontender, positive bowel sounds  : JENNINGS + / -  negative  MUSKULOSKELETAL:  moves all extremities  SKIN / BREAST:  no rash seen, mild purplish bruising LLE Lateral aspect  EXTREMITIES:  1+ pitting edema  VASCULAR:    all pulses  intact (radial, femoral, DP/PT)  INCISION: healed laparotomy incision                                                                  LABS:                        12.2   8.8   )-----------( 136      ( 02 May 2018 11:42 )             37.4     05-02    137  |  100  |  13  ----------------------------<  91  4.0   |  25  |  0.84    Ca    9.0      02 May 2018 11:42  Phos  3.5     05-02  Mg     1.8     05-02    TPro  7.2  /  Alb  3.8  /  TBili  0.7  /  DBili  x   /  AST  26  /  ALT  15  /  AlkPhos  46  05-02    PT/INR - ( 02 May 2018 01:18 )   PT: 11.1 sec;   INR: 1.00          PTT - ( 02 May 2018 17:48 )  PTT:78.7 sec    CARDIAC MARKERS ( 02 May 2018 11:42 )  x     / 0.01 ng/mL / x     / x     / x      CARDIAC MARKERS ( 02 May 2018 01:18 )  x     / 0.02 ng/mL / 47 U/L / x     / 3.7 ng/mL  CARDIAC MARKERS ( 01 May 2018 19:33 )  0.293 ng/mL / x     / x     / x     / x            Thyroid Stimulating Hormone, Serum: 0.205 uIU/mL (05-02 @ 01:18)  Hemoglobin A1C, Whole Blood: 4.9 % (05-02 @ 01:16)    Serum Pro-Brain Natriuretic Peptide: 5694 pg/mL (05-02-18 @ 01:18)      RADIOLOGY & ADDITIONAL STUDIES:  CAROTID U/S:ordered    CXR:  < from: Xray Chest 1 View- PORTABLE-Urgent (05.02.18 @ 02:03) >  History: Chest pain shortness of breath    Mild hypoinflation. Prominent size heart. No focal lung infiltrate or   pleural effusion. Aortic arch vascular calcification.    < end of copied text >    CT Scan: < from: CT Angio Chest w/ IV Cont (05.01.18 @ 23:05) >  IMPRESSION:  1. No evidence of a pulmonary embolism.  2. Interstitial pulmonary edema and trace bilateral pleural effusions.  3. Age-indeterminate T4, T5 and T6 vertebral body compression fracture   deformities.    < end of copied text >      EKG:< from: 12 Lead ECG (05.02.18 @ 06:27) >  Ventricular Rate 67 BPM    Atrial Rate 67 BPM  P-R Interval 140 ms  QRS Duration 96 ms  Q-T Interval 434 ms  QTC Calculation(Bezet) 458 ms  P Axis 61 degrees  R Axis 4 degrees  T Axis 24 degrees  Diagnosis Line Normal sinus rhythm  Minimal voltage criteria for LVH, may be normal variant    < end of copied text >     YASEMIN:< from: Echocardiogram (05.02.18 @ 13:35) >  Normal left ventricular size and wall thickness.There is a septal "knuckle" with no left ventricular outflow obstructionThe left ventricular wall motion is normal.Right atrial size is normal.  The right ventricle is normal in size and function.The right ventricular systolic function is normal. Calcified valve.No aortic regurgitation noted.There is Severe aortic stenosis.The  calculated aortic valve area using the continuity equation is 0.7 cm2.The mean pressure gradient is 40 mmHg.There is severe mitral annular calcification.There is trace to mild mitral  regurgitation.There is mild mitral stenosis.The mean pressure gradient is 6 mmHg.Structurally normal tricuspid valve.There is trace tricuspid regurgitation.The pulmonary artery   systolic pressure is estimated to be 49 mmHg.No aortic root dilatation.The inferior vena cava is normal in size (<2.1 cm) with normal inspiratory collapse (>50%) consistent with normal right atrial   pressure.  There is no pericardial effusion.    < end of copied text >      Cardiac Cath:

## 2018-05-02 NOTE — H&P ADULT - PMH
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

## 2018-05-02 NOTE — H&P ADULT - NSHPLABSRESULTS_GEN_ALL_CORE
LABS:                         13.1   6.8   )-----------( 116      ( 02 May 2018 01:17 )             41.2     05-02    139  |  101  |  17  ----------------------------<  87  3.6   |  27  |  0.90    Ca    9.1      02 May 2018 01:18  Phos  3.5     05-02  Mg     1.9     05-02    TPro  7.2  /  Alb  3.8  /  TBili  0.7  /  DBili  x   /  AST  26  /  ALT  15  /  AlkPhos  46  05-02    PT/INR - ( 02 May 2018 01:18 )   PT: 11.1 sec;   INR: 1.00          PTT - ( 02 May 2018 01:18 )  PTT:43.7 sec    CARDIAC MARKERS ( 02 May 2018 01:18 )  x     / 0.02 ng/mL / 47 U/L / x     / 3.7 ng/mL  CARDIAC MARKERS ( 01 May 2018 19:33 )  0.293 ng/mL / x     / x     / x     / x                RADIOLOGY, EKG & ADDITIONAL TESTS: Reviewed.   < from: CT Angio Chest w/ IV Cont (05.01.18 @ 23:05) >    IMPRESSION:  1. No evidence of a pulmonary embolism.  2. Interstitial pulmonary edema and trace bilateral pleural effusions.  3. Age-indeterminate T4, T5 and T6 vertebral body compression fracture   deformities.    < end of copied text >

## 2018-05-02 NOTE — PROGRESS NOTE ADULT - SUBJECTIVE AND OBJECTIVE BOX
EP was asked to check the patient's [employer] ILR for events.    The battery reached end of service (EOS) December 29, 2017. Recommend having the patient's EP remove the device.     Episodes as of EOS date: NONE    No tachy  No joshua  No pause   No AT/AF

## 2018-05-02 NOTE — H&P ADULT - NSHPPHYSICALEXAM_GEN_ALL_CORE
VITAL SIGNS:  T(C): 36.8 (05-02-18 @ 00:17), Max: 36.9 (05-01-18 @ 18:35)  T(F): 98.2 (05-02-18 @ 00:17), Max: 98.4 (05-01-18 @ 18:35)  HR: 76 (05-02-18 @ 03:00) (65 - 81)  BP: 183/93 (05-02-18 @ 03:00) (160/84 - 198/101)  BP(mean): 138 (05-02-18 @ 03:00) (113 - 155)  RR: 18 (05-02-18 @ 02:30) (18 - 20)  SpO2: 100% (05-02-18 @ 03:00) (95% - 100%)      PHYSICAL EXAM:    Constitutional: WDWN resting comfortably in bed; NAD  Head: NC/AT  Eyes: PERRL, EOMI, anicteric sclera  ENT: no nasal discharge; uvula midline, no oropharyngeal erythema or exudates; MMM  Neck: supple; no JVD or thyromegaly  Respiratory: trace bibasilar crackles; no W/R/R, no retractions  Cardiac: +S1/S2; RRR; 3/6 systolic murmur best heard at R UICB w/radiation to carotids   Gastrointestinal: soft, NT/ND; no rebound or guarding; +BSx4  Genitourinary: no yuen  Back: spine midline, no bony tenderness or step-offs; no CVAT B/L  Extremities: WWP, no clubbing or cyanosis; trace non pitting edema of L LE to knee  Musculoskeletal: NROM x4; no joint swelling, tenderness or erythema  Vascular: 2+ distal pulses B/L  Dermatologic: skin warm, dry and intact  Lymphatic: no submandibular or cervical LAD  Neurologic: AAOx3; no focal deficits  Psychiatric: affect and characteristics of appearance, verbalizations, behaviors are appropriate VITAL SIGNS:  T(C): 36.8 (05-02-18 @ 00:17), Max: 36.9 (05-01-18 @ 18:35)  T(F): 98.2 (05-02-18 @ 00:17), Max: 98.4 (05-01-18 @ 18:35)  HR: 76 (05-02-18 @ 03:00) (65 - 81)  BP: 183/93 (05-02-18 @ 03:00) (160/84 - 198/101)  BP(mean): 138 (05-02-18 @ 03:00) (113 - 155)  RR: 18 (05-02-18 @ 02:30) (18 - 20)  SpO2: 100% (05-02-18 @ 03:00) (95% - 100%)      PHYSICAL EXAM:    Constitutional: WDWN resting comfortably in bed; NAD  Head: NC/AT  Eyes: PERRL, EOMI, anicteric sclera  ENT: no nasal discharge; uvula midline, no oropharyngeal erythema or exudates; MMM  Neck: supple; no JVD or thyromegaly  Respiratory: trace bibasilar crackles; no W/R/R, no retractions  Cardiac: +S1/S2; RRR; 3/6 systolic murmur best heard at R UICB w/radiation to carotids   Gastrointestinal: soft, NT/ND; no rebound or guarding; +BSx4  Genitourinary: no yuen  Back: spine midline, no bony tenderness or step-offs; no CVAT B/L  Extremities: WWP, no clubbing or cyanosis; 1+ non pitting edema of L LE to knee  Musculoskeletal: NROM x4; no joint swelling, tenderness or erythema  Vascular: 2+ distal pulses B/L  Dermatologic: skin warm, dry and intact  Lymphatic: no submandibular or cervical LAD  Neurologic: AAOx3; no focal deficits  Psychiatric: affect and characteristics of appearance, verbalizations, behaviors are appropriate

## 2018-05-02 NOTE — H&P ADULT - NSHPREVIEWOFSYSTEMS_GEN_ALL_CORE
Review of symptoms:   General: No recent weight gain or loss, no new fatigue, no difficulty sleeping  Neuro: No new onset weakness, slurred speech, inability to follow commands  Vision: No recent changes in vision  HEENT: No difficulty swallowing  Cardiac: + chest pain. No dyspnea. No palpitations.  Respiratory: no cough. No dyspnea  Gastrointestinal: No diarrhea. No abdominal pain. No hematochezia. No melena  : No difficulty urinating, no polyuria, no hematuria  Extremities: + chronic L LE edema  Skin: No new unexplained bruising  Lymph nodes: No new lymphadenopathy  Mental Health: Not depressed. No SI/HI

## 2018-05-02 NOTE — CONSULT NOTE ADULT - ASSESSMENT
89 year old female with a PMH of CAD s/p MAIA x3 in 2005 c/b GI bleed while on Plavix, AS, palpitations s/p loop recorder, Crohn's c/b recurrent enterocutaneous fistulas in her lower abdomen which were likely caused by a MRSA colonized surgical mesh on chronic antibiotics, s/p partial bowel resection, HTN, and hypothyroidism, syncope 1 month ago while straining on the toilet who presented to [facility] ED c/o acute onset chest pain.   Patient was flying back to [place] from [place], when she reported left flank pain with fullness in her abdomen  associated w/nausea that lasted about 30 minutes. Pt was put on 2L NC and PIV placed in flight. Pt noted that her symptoms resolved w/oxygen, IVF, and aspirin 250mg. Patient was given aspirin and lovenox at [facility] and CT chest was negative for PE. Patient was transferred to [facility] for management of NSTEMI where she was given Brillinta 180mg, TTE showed AS.   CT surgery team was consulted to evaluate for possible AVR      Plan  AS  Dr. Humphrey to evaluate if surgical candidate for AVR

## 2018-05-02 NOTE — H&P ADULT - PROBLEM SELECTOR PLAN 1
Pt presenting to  hospital w/acute onset CP, found to have elevated trops with EKG without obvious signs of ischemia. Pt was given ASA/Lovenox at  hospital with initial concern for PE; however, CTA was neg for PE. Pt transferred to [facility] for continued management of NSTEMI.   - start hep gtt 12 hours after Lovenox dose (8AM)  - s/p Brilinta 180mg; start Brilinta 90mg PO BID tomorrow; pt expressed concern about hx of GI bleed on Plavix, but would like to try different a/c medication despite explained risks of bleeding  - NPO for cardiac cath in AM  - started Lipitor 80mg qd  - started Lopressor 12.5 mg PO q12h  - will start ACE as tolerated  - f/u echo  - daily EKG and CXR  - trend trop to peak  - TSH 0.205 low, A1C 4.9, Lipid panel WNL

## 2018-05-02 NOTE — PROGRESS NOTE ADULT - ASSESSMENT
Need to f/u pending workup results  Carotid Duplex, PFT's, Structural CT Need to f/u pending workup results  Carotid Duplex, PFT's, UA, Structural CT

## 2018-05-02 NOTE — PROGRESS NOTE ADULT - SUBJECTIVE AND OBJECTIVE BOX
89 y/o F w/PMH of CAD s/p MAIA x3 in 2012 c/b GI bleed while on Plavix, AS, palpitations s/p loop recorder, Crohn's c/b fistula and MRSA infection s/p partial bowel resection, HTN, and hypothyroidism presented to [facility] ED c/o acute onset chest pain. Hx supplemented by son, who was at bedside. Pt was flying back to [place] from [place], when she reported mild CP that started at base of lungs and progressed to center of chest. CP radiated to back and was associated w/nausea that lasted about 30 minutes. Pt was put on 2L NC and PIV placed in flight. Pt noted that her sx resolved w/oxygen, IVF, and aspirin 250mg. Pt denied SOB, productive cough, palpitations, recent illness, hx blood clots, f/c/v/d, abdominal pain, urinary symptoms, change in medications, HA, dizziness, changes in vision.    In  ED, T 98.4, HR 68, /84, RR 18, O2 96% on RA. Labs s/f D-dimer 461, trop I 0.293. EKG s/f NSR. CTA chest neg for PE. Pt given ASA 81mg and Lovenox 60mg.    Upon arrival to CCU, VS s/f /88. Pt given Lopressor 25mg PO with improvement of SBP to 150s. Anticoagulation and indication for PCI was discussed with pt and family, who was concerned about hx of GI bleed on Plavix; however, pt and family would like to proceed with alternative A/C medication despite risks of bleeding. Pt was Brilinta loaded with plan for hep gtt and cardiac cath in AM.    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    Past Surgical History:  H/O total hysterectomy    History of bowel resection    S/P drug eluting coronary stent placement.  	  MEDICATIONS:  metoprolol tartrate 12.5 milliGRAM(s) Oral every 12 hours    minocycline 100 milliGRAM(s) Oral daily      ALPRAZolam 0.125 milliGRAM(s) Oral every 8 hours  melatonin 3 milliGRAM(s) Oral at bedtime    pantoprazole    Tablet 40 milliGRAM(s) Oral before breakfast  polyethylene glycol 3350 17 Gram(s) Oral daily    atorvastatin 80 milliGRAM(s) Oral at bedtime  levothyroxine 88 MICROGram(s) Oral daily    aspirin  chewable 81 milliGRAM(s) Oral daily  chlorhexidine 2% Cloths 1 Application(s) Topical daily  heparin  Infusion 1100 Unit(s)/Hr IV Continuous <Continuous>  multivitamin 1 Tablet(s) Oral daily  ticagrelor 90 milliGRAM(s) Oral two times a day      Complaint:     Otherwise 12 point review of systems is normal.    PHYSICAL EXAM:    Constitutional: good  Eyes: PERRL, EOMI, sclera non-icteric  Neck: supple, no masses, no JVD  Respiratory: CTA b/l, good air entry b/l, no wheezing, rhonchi, rales, with normal respiratory effort and no intercostal retractions  Cardiovascular: RRR, normal S1S2, no M/R/G  Gastrointestinal: soft, NTND, no masses palpable, BS normal in all four quadrants,   Extremities:  no c/c/e  Neurological: AAOx3      ICU Vital Signs Last 24 Hrs  T(C): 36.4 (02 May 2018 10:00), Max: 36.9 (01 May 2018 18:35)  T(F): 97.6 (02 May 2018 10:00), Max: 98.4 (01 May 2018 18:35)  HR: 72 (02 May 2018 11:00) (64 - 82)  BP: 152/68 (02 May 2018 11:00) (132/71 - 198/101)  BP(mean): 113 (02 May 2018 11:00) (88 - 155)  ABP: --  ABP(mean): --  RR: 20 (02 May 2018 11:00) (16 - 20)  SpO2: 99% (02 May 2018 11:00) (95% - 100%)          ECG:      CHEST X RAY    CT    MRI    MRA    CT ANGIO    CAROTID DUPLEX    DUPLEX     Echocardiogram    Catheterization:    Stress Test:     LABS:	 	  CARDIAC MARKERS:  Troponin T, Serum: 0.01 ng/mL [0.00 - 0.01] (05-02 @ 11:42)  Troponin T, Serum: 0.02 ng/mL <H> [0.00 - 0.01] (05-02 @ 01:18)  Troponin I, Serum: 0.293 ng/mL <H> [0.000 - 0.045] (05-01 @ 19:33)                              12.2   8.8   )-----------( 136      ( 02 May 2018 11:42 )             37.4     05-02    137  |  100  |  13  ----------------------------<  91  4.0   |  25  |  0.84    Ca    9.0      02 May 2018 11:42  Phos  3.5     05-02  Mg     1.8     05-02    TPro  7.2  /  Alb  3.8  /  TBili  0.7  /  DBili  x   /  AST  26  /  ALT  15  /  AlkPhos  46  05-02    proBNP: Serum Pro-Brain Natriuretic Peptide: 5694 pg/mL (05-02 @ 01:18)    Lipid Profile:   HgA1c: Hemoglobin A1C, Whole Blood: 4.9 % (05-02 @ 01:16)    TSH: Thyroid Stimulating Hormone, Serum: 0.205 uIU/mL (05-02 @ 01:18)            ASSESSMENT/PLAN: 89 y/o F w/PMH of CAD s/p MAIA x3 in 2012 c/b GI bleed while on Plavix, AS, palpitations s/p loop recorder, Crohn's c/b fistula and MRSA infection s/p partial bowel resection, HTN, and hypothyroidism presented to [facility] ED c/o acute onset chest pain. Hx supplemented by son, who was at bedside. Pt was [place] from [place], when she reported mild CP that started at base of lungs and progressed to center of chest. CP radiated to back and was associated w/nausea that lasted about 30 minutes. Pt was put on 2L NC and PIV placed in flight. Pt noted that her sx resolved w/oxygen, IVF, and aspirin 250mg. Pt denied SOB, productive cough, palpitations, recent illness, hx blood clots, f/c/v/d, abdominal pain, urinary symptoms, change in medications, HA, dizziness, changes in vision.    In  ED, T 98.4, HR 68, /84, RR 18, O2 96% on RA. Labs s/f D-dimer 461, trop I 0.293. EKG s/f NSR. CTA chest neg for PE. Pt given ASA 81mg and Lovenox 60mg.    Upon arrival to CCU, VS s/f /88. Pt given Lopressor 25mg PO with improvement of SBP to 150s. Anticoagulation and indication for PCI was discussed with pt and family, who was concerned about hx of GI bleed on Plavix; however, pt and family would like to proceed with alternative A/C medication despite risks of bleeding. Pt was Brilinta loaded with plan for hep gtt and cardiac cath in AM.    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    Past Surgical History:  H/O total hysterectomy    History of bowel resection    S/P drug eluting coronary stent placement.  	  MEDICATIONS:  metoprolol tartrate 12.5 milliGRAM(s) Oral every 12 hours    minocycline 100 milliGRAM(s) Oral daily      ALPRAZolam 0.125 milliGRAM(s) Oral every 8 hours  melatonin 3 milliGRAM(s) Oral at bedtime    pantoprazole    Tablet 40 milliGRAM(s) Oral before breakfast  polyethylene glycol 3350 17 Gram(s) Oral daily    atorvastatin 80 milliGRAM(s) Oral at bedtime  levothyroxine 88 MICROGram(s) Oral daily    aspirin  chewable 81 milliGRAM(s) Oral daily  chlorhexidine 2% Cloths 1 Application(s) Topical daily  heparin  Infusion 1100 Unit(s)/Hr IV Continuous <Continuous>  multivitamin 1 Tablet(s) Oral daily  ticagrelor 90 milliGRAM(s) Oral two times a day      Complaint:     Otherwise 12 point review of systems is normal.    PHYSICAL EXAM:    Constitutional:NAD  Eyes: PERRL, EOMI, sclera non-icteric  Neck: supple, no masses, no JVD  Respiratory: CTA b/l, good air entry b/l, no wheezing, rhonchi, rales,   Cardiovascular: RRR, normal S1S2, no M/R/G  Gastrointestinal: soft, NTND, no masses palpable, BS +  Extremities:  +1edema  Neurological: AAOx3      ICU Vital Signs Last 24 Hrs  T(C): 36.4 (02 May 2018 10:00), Max: 36.9 (01 May 2018 18:35)  T(F): 97.6 (02 May 2018 10:00), Max: 98.4 (01 May 2018 18:35)  HR: 72 (02 May 2018 11:00) (64 - 82)  BP: 152/68 (02 May 2018 11:00) (132/71 - 198/101)  BP(mean): 113 (02 May 2018 11:00) (88 - 155)  ABP: --  ABP(mean): --  RR: 20 (02 May 2018 11:00) (16 - 20)  SpO2: 99% (02 May 2018 11:00) (95% - 100%)          ECG:    < from: 12 Lead ECG (05.02.18 @ 06:27) >    Diagnosis Line Normal sinus rhythm  Minimal voltage criteria for LVH, may be normal variant    < end of copied text >      CHEST X RAY  < from: Xray Chest 1 View- PORTABLE-Urgent (05.02.18 @ 02:03) >  Mild hypoinflation. Prominent size heart. No focal lung infiltrate or   pleural effusion. Aortic arch vascular calcification.    < end of copied text >    CT    MRI    MRA    CT ANGIO    CAROTID DUPLEX    DUPLEX     Echocardiogram  < from: Echocardiogram (05.02.18 @ 13:35) >  A complete two-dimensional transthoracic echocardiogram was performed (2D,   M-mode, spectral and color flow doppler).  Study Quality: Good.Normal   left   ventricular size and wall thickness.There is a septal "knuckle" with no  left   ventricular outflow obstructionThe left ventricular wall motion is   normal.Right atrial size is normal.The right ventricle is normal in size   and   function.The right ventricular systolic function is normal.Calcified   aortic   valve.No aortic regurgitation noted.There is Severe aortic stenosis.The   calculated aortic valve area using the continuity equation is 0.7   cm2.The mean   pressure gradient is 40 mmHg.There is severe mitral annular   calcification.There is trace to mild mitralregurgitation.There is mild   mitral   stenosis.The mean pressure gradient is 6 mmHg.Structurally normal   tricuspid   valve.There is trace tricuspid regurgitation.The pulmonary artery   systolic   pressure is estimated to be 49 mmHg.No aortic root dilatation.The   inferior   vena cava is normal in size (<2.1 cm) with normal inspiratory collapse   (>50%)   consistent with normal right atrial pressure.  There is no pericardial   effusion.  Procedure Details  A complete two-dimensional transthoracic echocardiogram was performed (2D,  M-mode, spectral and color flow doppler).  Study Quality: Good.  Left Ventricle  Normal left ventricular size and wall thickness.  There is a septal "knuckle" with no left ventricular outflow obstruction  The left ventricular wall motion is normal.  The left ventricular ejection fraction is estimated to be 60-65%  Left Atrium  The left atrium is moderately dilated.  The interatrial septum is intact with no color Doppler evidence for  interatrial shunting  RightAtrium  Right atrial size is normal.  Right Ventricle  The right ventricle is normal in size and function.  The right ventricular systolic function is normal.  < from: Echocardiogram (05.02.18 @ 13:35) >  Aortic Valve  Calcified aortic valve.  No aortic regurgitation noted.  There is Severe aortic stenosis.  The calculated aortic valve area using the continuity equation is 0.7 cm2.  The mean pressure gradient is 40 mmHg.  Mitral Valve  There is severe mitral annular calcification.  There is trace to mild mitral regurgitation.  There is mildmitral stenosis.  The mean pressure gradient is 6 mmHg.  Tricuspid Valve  Structurally normal tricuspid valve.  There is trace tricuspid regurgitation.  The pulmonary artery systolic pressure is estimated to be 49 mmHg.  There is no evidence for tricuspid stenosis.  Pulmonic Valve  No pulmonic regurgitation noted.  There is no pulmonic stenosis.  Arteries and Venous System  No aortic root dilatation.  The inferior vena cava is normal in size (<2.1 cm) with normal inspiratory  collapse (>50%) consistent with normal right atrial pressure.  Pericardium / Pleura  There is no pericardial effusion.    < end of copied text >        Catheterization:    Stress Test:     LABS:	 	  CARDIAC MARKERS:  Troponin T, Serum: 0.01 ng/mL [0.00 - 0.01] (05-02 @ 11:42)  Troponin T, Serum: 0.02 ng/mL <H> [0.00 - 0.01] (05-02 @ 01:18)  Troponin I, Serum: 0.293 ng/mL <H> [0.000 - 0.045] (05-01 @ 19:33)                              12.2   8.8   )-----------( 136      ( 02 May 2018 11:42 )             37.4     05-02    137  |  100  |  13  ----------------------------<  91  4.0   |  25  |  0.84    Ca    9.0      02 May 2018 11:42  Phos  3.5     05-02  Mg     1.8     05-02    TPro  7.2  /  Alb  3.8  /  TBili  0.7  /  DBili  x   /  AST  26  /  ALT  15  /  AlkPhos  46  05-02    proBNP: Serum Pro-Brain Natriuretic Peptide: 5694 pg/mL (05-02 @ 01:18)    Lipid Profile:   HgA1c: Hemoglobin A1C, Whole Blood: 4.9 % (05-02 @ 01:16)    TSH: Thyroid Stimulating Hormone, Serum: 0.205 uIU/mL (05-02 @ 01:18)    ASSESSMENT/PLAN: 	  89 y/o F w/PMH of CAD s/p MAIA x3 in 2012 c/b GI bleed while on Plavix, AS, syncope s/p loop recorder, Crohn's c/b fistula and MRSA infection s/p partial bowel resection, HTN, and hypothyroidism presented to [facility] ED c/o acute onset chest pain, found to have NSTEMI and transferred to [facility] CCU for further management.    Problem/Plan - 1:  ·  Problem: NSTEMI (non-ST elevated myocardial infarction).  Plan: Pt presenting to [facility] w/acute onset CP, found to have elevated trops with EKG without obvious signs of ischemia. Pt was given ASA/Lovenox at [facility] hospital with initial concern for PE; however, CTA was neg for PE. Pt transferred to [facility] for continued management of NSTEMI.   - start hep gtt 12 hours after Lovenox dose (8AM)  - s/p Brilinta 180mg; start Brilinta 90mg PO BID tomorrow; pt expressed concern about hx of GI bleed on Plavix, but would like to try different a/c medication despite explained risks of bleeding  - NPO for cardiac cath in AM  - started Lipitor 80mg qd  - started Lopressor 12.5 mg PO q12h  - will start ACE as tolerated  - f/u echo  - daily EKG and CXR  - trend trop to peak  - TSH 0.205 low, A1C 4.9, Lipid panel WNL.   Problem/Plan - 2:  ·  Problem: CAD (coronary artery disease).  Plan: s/p MAIA x3, last placed in [place] in 2012 c/b GI bleed presumably from Plavix, which was discontinued after a year. Followed by Dr. Luis Hickey in [place]   -started ASA 81mg qd, Brilinta 90mg BID, and Lipitor 80mg  -obtain collateral from Dr. Hickey [phone].     Problem/Plan - 3:  ·  Problem: Aortic stenosis.  Plan: Pt reported hx of AS, and physical exam noted 3/6 systolic murmur  - f/u echo  - NPO for right heart cath  - consult structural heart; appreciate recs  - consult EP for interrogation of loop recorder  - CT chest.     Problem/Plan - 4:  ·  Problem: Crohn's disease.  Plan: c/b fistula and MRSA infection s/p partial bowel resection. Takes Humira every other Monday. While GI bleed was presumed to be from Plavix, bleeding may have been from complications of Crohns disease.  - c/w Humira 40mg SQ every other Monday  - c/w Protonix 40mg PO; on omeprazole 20mg at home  - obtain collateral from Dr. Hickey.     Problem/Plan - 5:  ·  Problem: Hypothyroidism.  Plan: -c/w levothyroxine 88mcg qd.     Problem/Plan - 6:  Problem: Anxiety. Plan: Pt takes alprazolam 0.125mg in AM and lunch and 0.25mg at bedtime for mild anxiety  - holding for now.    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  Plan: Pt on minocycline 100mg PO qd for hx of abdominal MRSA infection.  - c/w minocycline 100mg PO qd.     Problem/Plan - 8:  ·  Problem: Constipation.  Plan: Pt's last BM two days ago and reported chronic constipation  - c/w Miralax 17g PO qd.

## 2018-05-02 NOTE — H&P ADULT - NSHPSOCIALHISTORY_GEN_ALL_CORE
Denied tobacco, EtOH, and illicit drug use  Has son and daughter; 7 grand children  Lives in nursing home

## 2018-05-02 NOTE — H&P ADULT - PROBLEM SELECTOR PLAN 4
-c/w levothyroxine 88mcg qd c/b fistula and MRSA infection s/p partial bowel resection. Takes Humira every other Monday. While GI bleed was presumed to be from Plavix, bleeding may have been from complications of Crohns disease.  - c/w Humira 40mg SQ every other Monday  - c/w Protonix 40mg PO; on omeprazole 20mg at home  - obtain collateral from Dr. Hickey

## 2018-05-02 NOTE — H&P ADULT - PROBLEM SELECTOR PLAN 6
Pt reported hx of AS, and physical exam noted 3/6 systolic murmur  - f/u echo  - management as above Pt takes alprazolam 0.125mg in AM and lunch and 0.25mg at bedtime for mild anxiety  - holding for now

## 2018-05-02 NOTE — H&P ADULT - PROBLEM SELECTOR PLAN 5
Pt takes alprazolam 0.25mg TID for mild anxiety  - holding for now Pt takes alprazolam 0.125mg in AM and lunch and 0.25mg at bedtime for mild anxiety  - starting ativan 0.25mg PO q8h Pt takes alprazolam 0.125mg in AM and lunch and 0.25mg at bedtime for mild anxiety  - holding for now -c/w levothyroxine 88mcg qd

## 2018-05-02 NOTE — H&P ADULT - PROBLEM SELECTOR PLAN 9
F: No IVF  E: Replete PRN  N: DASH/TLC; NPO for cardiac cath in AM  VTE PPX: s/p Lovenox, will be on hep gtt    FULL CODE  DISPO: CCU

## 2018-05-02 NOTE — H&P ADULT - PROBLEM SELECTOR PLAN 3
c/b fistula and MRSA infection s/p partial bowel resection. Takes Humira every other Monday. While GI bleed was presumed to be from Plavix, bleeding may have been from complications of Crohns disease.  - c/w Humira 40mg SQ every other Monday  - c/w Protonix 40mg PO; on omeprazole 20mg at home  - obtain collateral from Dr. Hickey Pt reported hx of AS, and physical exam noted 3/6 systolic murmur  - f/u echo  - NPO for right heart cath  - consult structural heart; appreciate recs  - consult EP for interrogation of loop recorder  - CT chest

## 2018-05-02 NOTE — PROGRESS NOTE ADULT - SUBJECTIVE AND OBJECTIVE BOX
Discussed with Dr. 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 History was supplemented by son a medical doctor and was at bedside. Patient was flying back to [place] from [place], when she reported left flank pain with fullness in her abdomen  associated w/nausea that lasted about 30 minutes. Pt was put on 2L NC and PIV placed in flight. Pt noted that her symptoms resolved w/oxygen, IVF, and aspirin 250mg.   Pt denied SOB, productive cough, palpitations, recent illness, hx blood clots, f/c/v/d, abdominal pain, urinary symptoms, change in medications, HA, dizziness, changes in vision. Patient was given aspirin and lovenox at [facility] and CT chest was negative for PE. Patient was transferred to [facility] for management of NSTEMI where she was given Brillinta 180mg, TTE showed AS.   CT surgery team was consulted to evaluate for possible AVR    T(C): 36.8 (18 @ 17:04), Max: 36.8 (18 @ 00:17)  HR: 68 (18 @ 20:00) (64 - 82)  BP: 114/57 (18 @ 20:00) (114/57 - 198/101)  RR: 29 (18 @ 20:00) (16 - 29)  SpO2: 98% (18 @ 20:00) (95% - 100%)  Wt(kg): --  Daily Height in cm: 152.4 (02 May 2018 00:17)    Daily Weight in k.6 (02 May 2018 05:52)    Physical Exam  General:Patient seen bedside in NAD  NEUROLOGICAL:  Alert and oriented x3, no gross deficits appreciated                   CARDIOVASCULAR:     RRR, S1S2, 3/6 murmur LSB  RESPIRATORY:  equal breath sounds, no rales, no rhonchi, no wheeze  GASTROINTESTINAL:  soft, nontender, positive bowel sounds  SKIN / BREAST:  no rash seen, mild purplish bruising LLE Lateral aspect  EXTREMITIES:  1+ pitting edema                          12.2   8.8   )-----------( 136      ( 02 May 2018 11:42 )             37.4         137  |  100  |  13  ----------------------------<  91  4.0   |  25  |  0.84    Ca    9.0      02 May 2018 11:42  Phos  3.5     05-  Mg     1.8         TPro  7.2  /  Alb  3.8  /  TBili  0.7  /  DBili  x   /  AST  26  /  ALT  15  /  AlkPhos  46

## 2018-05-02 NOTE — PROGRESS NOTE ADULT - SUBJECTIVE AND OBJECTIVE BOX
Planned Date of Surgery:  5/3/18                                                                                                           Surgeon:Dr. 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 History was supplemented by son a medical doctor and was at bedside. Patient was flying back to [place] from [place], when she reported left flank pain with fullness in her abdomen  associated w/nausea that lasted about 30 minutes. Pt was put on 2L NC and PIV placed in flight. Pt noted that her symptoms resolved w/oxygen, IVF, and aspirin 250mg.   Pt denied SOB, productive cough, palpitations, recent illness, hx blood clots, f/c/v/d, abdominal pain, urinary symptoms, change in medications, HA, dizziness, changes in vision. Patient was given aspirin and lovenox at [facility] and CT chest was negative for PE. Patient was transferred to [facility] for management of NSTEMI where she was given Brillinta 180mg, TTE showed AS.   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  Crohns disease  CAD (coronary artery disease)  Aortic stenosis  H/O total hysterectomy  History of bowel resection  S/P drug eluting coronary stent placement      No Known Allergies      MEDICATIONS  (STANDING):  ALPRAZolam 0.125 milliGRAM(s) Oral every 8 hours  aspirin  chewable 81 milliGRAM(s) Oral daily  atorvastatin 80 milliGRAM(s) Oral at bedtime  chlorhexidine 0.12% Liquid 5 milliLiter(s) Swish and Spit once  chlorhexidine 2% Cloths 1 Application(s) Topical daily  chlorhexidine 4% Liquid 1 Application(s) Topical once  heparin  Infusion 800 Unit(s)/Hr (8 mL/Hr) IV Continuous <Continuous>  levothyroxine 88 MICROGram(s) Oral daily  melatonin 3 milliGRAM(s) Oral at bedtime  metoprolol tartrate 12.5 milliGRAM(s) Oral every 12 hours  minocycline 100 milliGRAM(s) Oral daily  multivitamin 1 Tablet(s) Oral daily  pantoprazole    Tablet 40 milliGRAM(s) Oral before breakfast  polyethylene glycol 3350 17 Gram(s) Oral daily  ticagrelor 90 milliGRAM(s) Oral two times a day    MEDICATIONS  (PRN):      On Beta Blocker? YES     Labs:                        12.2   8.8   )-----------( 136      ( 02 May 2018 11:42 )             37.4     05-02    137  |  100  |  13  ----------------------------<  91  4.0   |  25  |  0.84    Ca    9.0      02 May 2018 11:42  Phos  3.5     05-02  Mg     1.8     05-02    TPro  7.2  /  Alb  3.8  /  TBili  0.7  /  DBili  x   /  AST  26  /  ALT  15  /  AlkPhos  46  05-02    PT/INR - ( 02 May 2018 01:18 )   PT: 11.1 sec;   INR: 1.00          PTT - ( 02 May 2018 17:48 )  PTT:78.7 sec    ABO Interpretation: A (05-02-18 @ 11:04)      CARDIAC MARKERS ( 02 May 2018 11:42 )  x     / 0.01 ng/mL / x     / x     / x      CARDIAC MARKERS ( 02 May 2018 01:18 )  x     / 0.02 ng/mL / 47 U/L / x     / 3.7 ng/mL  CARDIAC MARKERS ( 01 May 2018 19:33 )  0.293 ng/mL / x     / x     / x     / x        Hgb A1C:4.9    EKG:< from: 12 Lead ECG (05.02.18 @ 06:27) >  Ventricular Rate 67 BPM    Atrial Rate 67 BPM  P-R Interval 140 ms  QRS Duration 96 ms  Q-T Interval 434 ms  QTC Calculation(Bezet) 458 ms  P Axis 61 degrees  R Axis 4 degrees  T Axis 24 degrees  Diagnosis Line Normal sinus rhythm  Minimal voltage criteria for LVH, may be normal variant    < end of copied text >    CXR:< from: Xray Chest 1 View- PORTABLE-Urgent (05.02.18 @ 02:03) >  History: Chest pain shortness of breath    Mild hypoinflation. Prominent size heart. No focal lung infiltrate or   pleural effusion. Aortic arch vascular calcification.      CT Scans:< from: CT Angio Chest w/ IV Cont (05.01.18 @ 23:05) >  IMPRESSION:  1. No evidence of a pulmonary embolism.  2. Interstitial pulmonary edema and trace bilateral pleural effusions.  3. Age-indeterminate T4, T5 and T6 vertebral body compression fracture   deformities.    < end of copied text >    Cath Report:    Echo: YASEMIN:< from: Echocardiogram (05.02.18 @ 13:35) >  Normal left ventricular size and wall thickness.There is a septal "knuckle" with no left ventricular outflow obstructionThe left ventricular wall motion is normal.Right atrial size is normal.  The right ventricle is normal in size and function.The right ventricular systolic function is normal. Calcified valve.No aortic regurgitation noted.There is Severe aortic stenosis.The  calculated aortic valve area using the continuity equation is 0.7 cm2.The mean pressure gradient is 40 mmHg.There is severe mitral annular calcification.There is trace to mild mitral  regurgitation.There is mild mitral stenosis.The mean pressure gradient is 6 mmHg.Structurally normal tricuspid valve.There is trace tricuspid regurgitation.The pulmonary artery   systolic pressure is estimated to be 49 mmHg.No aortic root dilatation.The inferior vena cava is normal in size (<2.1 cm) with normal inspiratory collapse (>50%) consistent with normal right atrial   pressure.  There is no pericardial effusion.    < end of copied text >      PFT's: ordered    Carotid Duplex:Ordered    Consult in Chart?  YES   Consent in Chart? YES   Pre-op Orders Placed? YES   Blood Products Ordered? YES   NPO ordered? YES / NO

## 2018-05-02 NOTE — CONSULT NOTE ADULT - ATTENDING COMMENTS
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 w/u included CT chest negative for PE. exam remarkable 3/6 [employer] base rad to carotid, cta b/l, no ccce. TTE with severe AS mean gradeint >40mmHg, preserved EF, no AI. troponin mildly positive. imp: severe AS; acute/chronic diastolic CHF; nstemi with hx of CAD/PCI; s/p ILR; crohns disease s/p multiple sx with hx of MRSA on longterm immunosuppresive therapy. high risk for SAVR.  -TAVR w/u  -ID evaluation  -plan for cath - possible PCI/BAV

## 2018-05-02 NOTE — CONSULT NOTE ADULT - SUBJECTIVE AND OBJECTIVE BOX
Surgeon: Dr. 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  Crohns disease  CAD (coronary artery disease)  Aortic stenosis  H/O total hysterectomy  History of bowel resection  S/P drug eluting coronary stent placement      MEDICATIONS  (STANDING):  ALPRAZolam 0.125 milliGRAM(s) Oral every 8 hours  aspirin  chewable 81 milliGRAM(s) Oral daily  atorvastatin 80 milliGRAM(s) Oral at bedtime  chlorhexidine 2% Cloths 1 Application(s) Topical daily  heparin  Infusion 1100 Unit(s)/Hr (11 mL/Hr) IV Continuous <Continuous>  levothyroxine 88 MICROGram(s) Oral daily  melatonin 3 milliGRAM(s) Oral at bedtime  metoprolol tartrate 12.5 milliGRAM(s) Oral every 12 hours  minocycline 100 milliGRAM(s) Oral daily  multivitamin 1 Tablet(s) Oral daily  pantoprazole    Tablet 40 milliGRAM(s) Oral before breakfast  polyethylene glycol 3350 17 Gram(s) Oral daily  ticagrelor 90 milliGRAM(s) Oral two times a day    MEDICATIONS  (PRN):      Allergies    No Known Allergies    Intolerances        SOCIAL HISTORY:  Smoker:  YES / NO        PACK YEARS:                         WHEN QUIT?  ETOH use:  YES / NO               FREQUENCY / QUANTITY:  Ilicit Drug use:  YES / NO  Occupation:  Assisted device use (Cane / Walker):  Live with:    FAMILY HISTORY:  No pertinent family history in first degree relatives      Review of Systems  CONSTITUTIONAL:  Fevers / chills, sweats, fatigue, weight loss, weight gain                                    NEGATIVE  NEURO:  parathesias, seizures, syncope, confusion                                                                               NEGATIVE  EYES:  Blurry vision, discharge, pain, loss of vision                                                                                  NEGATIVE  ENMT:  Difficulty hearing, vertigo, dysphagia, epistaxis, recent dental work                                     NEGATIVE  CV:  Chest pain, palpitations, DELVALLE, orthopnea                                                                                         NEGATIVE  RESPIRATORY:  Wheezing, SOB, cough / sputum, hemoptysis                                                              NEGATIVE  GI:  Nausea, vommiting, diarrhea, constipation, melena                                                                        NEGATIVE  : Hematuria, dysuria, urgency, incontinence                                                                                       NEGATIVE  MUSKULOSKELETAL:  arthritis, joint swelling, muscle weakness                                                           NEGATIVE  SKIN/BREAST:  rash, itching, osmar loss, masses                                                                                            NEGATIVE  PSYCH:  depresion, anxiety, suicidal ideation                                                                                             NEGATIVE  HEME/LYMPH:  bruises easily, enlarged lymph nodes, tender lymph nodes                                        NEGATIVE  ENDOCRINE:  cold intolerance, heat intolerance, polydipsia                                                                   NEGATIVE    PHYSICAL EXAM  Vital Signs Last 24 Hrs  T(C): 36.4 (02 May 2018 10:00), Max: 36.9 (01 May 2018 18:35)  T(F): 97.6 (02 May 2018 10:00), Max: 98.4 (01 May 2018 18:35)  HR: 68 (02 May 2018 16:00) (64 - 82)  BP: 171/89 (02 May 2018 16:00) (132/71 - 198/101)  BP(mean): 123 (02 May 2018 16:00) (88 - 155)  RR: 27 (02 May 2018 16:00) (16 - 27)  SpO2: 98% (02 May 2018 16:00) (95% - 100%)    CONSTITUTIONAL:                                                                          WNL  NEURO:                                                                                             WNL                      EYES:                                                                                                  WNL  ENMT:                                                                                                WNL  CV:                                                                                                      WNL  RESPIRATORY:                                                                                  WNL  GI:                                                                                                       WNL  : JENNINGS + / -                                                                                 WNL  MUSKULOSKELETAL:                                                                       WNL  SKIN / BREAST:                                                                                 WNL  Extremities  Vascular                                                          LABS:                        12.2   8.8   )-----------( 136      ( 02 May 2018 11:42 )             37.4     05-02    137  |  100  |  13  ----------------------------<  91  4.0   |  25  |  0.84    Ca    9.0      02 May 2018 11:42  Phos  3.5     05-02  Mg     1.8     05-02    TPro  7.2  /  Alb  3.8  /  TBili  0.7  /  DBili  x   /  AST  26  /  ALT  15  /  AlkPhos  46  05-02    PT/INR - ( 02 May 2018 01:18 )   PT: 11.1 sec;   INR: 1.00          PTT - ( 02 May 2018 14:57 )  PTT:195.6 sec    CARDIAC MARKERS ( 02 May 2018 11:42 )  x     / 0.01 ng/mL / x     / x     / x      CARDIAC MARKERS ( 02 May 2018 01:18 )  x     / 0.02 ng/mL / 47 U/L / x     / 3.7 ng/mL  CARDIAC MARKERS ( 01 May 2018 19:33 )  0.293 ng/mL / x     / x     / x     / x          Thyroid Stimulating Hormone, Serum: 0.205 uIU/mL (05-02 @ 01:18)  Hemoglobin A1C, Whole Blood: 4.9 % (05-02 @ 01:16)    Serum Pro-Brain Natriuretic Peptide: 5694 pg/mL (05-02-18 @ 01:18)      RADIOLOGY & ADDITIONAL STUDIES:  CAROTID U/S:    CXR:    CT Scan:    EKG:    TTE / YASEMIN:    Cardiac Cath: Surgeon: Dr. Crow Singleton    Requesting Physician: Dr. 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 History was supplemented by son a medical doctor and was at bedside. Patient was flying back to [place] from [place], when she reported left flank pain with fullness in her abdomen  associated w/nausea that lasted about 30 minutes. Pt was put on 2L NC and PIV placed in flight. Pt noted that her symptoms resolved w/oxygen, IVF, and aspirin 250mg.   Pt denied SOB, productive cough, palpitations, recent illness, hx blood clots, f/c/v/d, abdominal pain, urinary symptoms, change in medications, HA, dizziness, changes in vision. [facility] was negative for PE. Patient was transferred to [facility] for management of NSTEMI where she was given Brillinta 180mg, TTE showed AS.   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  Crohns disease  CAD (coronary artery disease)  Aortic stenosis  H/O total hysterectomy  History of bowel resection  S/P drug eluting coronary stent placement      MEDICATIONS  (STANDING):  ALPRAZolam 0.125 milliGRAM(s) Oral every 8 hours  aspirin  chewable 81 milliGRAM(s) Oral daily  atorvastatin 80 milliGRAM(s) Oral at bedtime  chlorhexidine 2% Cloths 1 Application(s) Topical daily  heparin  Infusion 1100 Unit(s)/Hr (11 mL/Hr) IV Continuous <Continuous>  levothyroxine 88 MICROGram(s) Oral daily  melatonin 3 milliGRAM(s) Oral at bedtime  metoprolol tartrate 12.5 milliGRAM(s) Oral every 12 hours  minocycline 100 milliGRAM(s) Oral daily  multivitamin 1 Tablet(s) Oral daily  pantoprazole    Tablet 40 milliGRAM(s) Oral before breakfast  polyethylene glycol 3350 17 Gram(s) Oral daily  ticagrelor 90 milliGRAM(s) Oral two times a day    MEDICATIONS  (PRN):      Allergies    No Known Allergies    Intolerances        SOCIAL HISTORY:  Smoker:  YES / NO        PACK YEARS:                         WHEN QUIT?  ETOH use:  YES / NO               FREQUENCY / QUANTITY:  Ilicit Drug use:  YES / NO  Occupation:  Assisted device use (Cane / Walker):  Live with:    FAMILY HISTORY:  No pertinent family history in first degree relatives      Review of Systems  CONSTITUTIONAL:  Fevers / chills, sweats, fatigue, weight loss, weight gain                                    NEGATIVE  NEURO:  parathesias, seizures, syncope, confusion                                                                               NEGATIVE  EYES:  Blurry vision, discharge, pain, loss of vision                                                                                  NEGATIVE  ENMT:  Difficulty hearing, vertigo, dysphagia, epistaxis, recent dental work                                     NEGATIVE  CV:  Chest pain, palpitations, DELVALLE, orthopnea                                                                                         NEGATIVE  RESPIRATORY:  Wheezing, SOB, cough / sputum, hemoptysis                                                              NEGATIVE  GI:  Nausea, vommiting, diarrhea, constipation, melena                                                                        NEGATIVE  : Hematuria, dysuria, urgency, incontinence                                                                                       NEGATIVE  MUSKULOSKELETAL:  arthritis, joint swelling, muscle weakness                                                           NEGATIVE  SKIN/BREAST:  rash, itching, osmar loss, masses                                                                                            NEGATIVE  PSYCH:  depresion, anxiety, suicidal ideation                                                                                             NEGATIVE  HEME/LYMPH:  bruises easily, enlarged lymph nodes, tender lymph nodes                                        NEGATIVE  ENDOCRINE:  cold intolerance, heat intolerance, polydipsia                                                                   NEGATIVE    PHYSICAL EXAM  Vital Signs Last 24 Hrs  T(C): 36.4 (02 May 2018 10:00), Max: 36.9 (01 May 2018 18:35)  T(F): 97.6 (02 May 2018 10:00), Max: 98.4 (01 May 2018 18:35)  HR: 68 (02 May 2018 16:00) (64 - 82)  BP: 171/89 (02 May 2018 16:00) (132/71 - 198/101)  BP(mean): 123 (02 May 2018 16:00) (88 - 155)  RR: 27 (02 May 2018 16:00) (16 - 27)  SpO2: 98% (02 May 2018 16:00) (95% - 100%)    Physical Exam  CONSTITUTIONAL:                                                      NEUROLOGICAL:  Alert and oriented x3, no gross deficits appreciated                   EYES:      PERRL           ENMT:  supple neck, no lymphadenopathy  CARDIOVASCULAR:     RRR, S1S2, 3/6 murmur LSB  RESPIRATORY:  equal breath sounds, no rales, no rhonchi, no wheeze  GASTROINTESTINAL:  soft, nontender, positive bowel sounds  : JENNINGS + / -  negative  MUSKULOSKELETAL:  moves all extremities  SKIN / BREAST:  no rash seen, mild purplish bruising LLE Lateral aspect  EXTREMITIES:  1+ pitting edema  VASCULAR:    all pulses  intact (radial, femoral, DP/PT)  INCISION: healed laparotomy incision                                        LABS:                        12.2   8.8   )-----------( 136      ( 02 May 2018 11:42 )             37.4     05-02    137  |  100  |  13  ----------------------------<  91  4.0   |  25  |  0.84    Ca    9.0      02 May 2018 11:42  Phos  3.5     05-02  Mg     1.8     05-02    TPro  7.2  /  Alb  3.8  /  TBili  0.7  /  DBili  x   /  AST  26  /  ALT  15  /  AlkPhos  46  05-02    PT/INR - ( 02 May 2018 01:18 )   PT: 11.1 sec;   INR: 1.00          PTT - ( 02 May 2018 14:57 )  PTT:195.6 sec    CARDIAC MARKERS ( 02 May 2018 11:42 )  x     / 0.01 ng/mL / x     / x     / x      CARDIAC MARKERS ( 02 May 2018 01:18 )  x     / 0.02 ng/mL / 47 U/L / x     / 3.7 ng/mL  CARDIAC MARKERS ( 01 May 2018 19:33 )  0.293 ng/mL / x     / x     / x     / x          Thyroid Stimulating Hormone, Serum: 0.205 uIU/mL (05-02 @ 01:18)  Hemoglobin A1C, Whole Blood: 4.9 % (05-02 @ 01:16)    Serum Pro-Brain Natriuretic Peptide: 5694 pg/mL (05-02-18 @ 01:18)      RADIOLOGY & ADDITIONAL STUDIES:  CAROTID U/S:oredered      CXR:  < from: Xray Chest 1 View- PORTABLE-Urgent (05.02.18 @ 02:03) >  History: Chest pain shortness of breath    Mild hypoinflation. Prominent size heart. No focal lung infiltrate or   pleural effusion. Aortic arch vascular calcification.    < end of copied text >    CT Scan: < from: CT Angio Chest w/ IV Cont (05.01.18 @ 23:05) >  IMPRESSION:  1. No evidence of a pulmonary embolism.  2. Interstitial pulmonary edema and trace bilateral pleural effusions.  3. Age-indeterminate T4, T5 and T6 vertebral body compression fracture   deformities.    < end of copied text >      EKG:< from: 12 Lead ECG (05.02.18 @ 06:27) >  Ventricular Rate 67 BPM    Atrial Rate 67 BPM  P-R Interval 140 ms  QRS Duration 96 ms  Q-T Interval 434 ms  QTC Calculation(Bezet) 458 ms  P Axis 61 degrees  R Axis 4 degrees  T Axis 24 degrees  Diagnosis Line Normal sinus rhythm  Minimal voltage criteria for LVH, may be normal variant    < end of copied text >     YASEMIN:< from: Echocardiogram (05.02.18 @ 13:35) >  Normal left ventricular size and wall thickness.There is a septal "knuckle" with no left ventricular outflow obstructionThe left ventricular wall motion is normal.Right atrial size is normal.  The right ventricle is normal in size and function.The right ventricular systolic function is normal. Calcified     valve.No aortic regurgitation noted.There is Severe aortic stenosis.The   calculated aortic valve area using the continuity equation is 0.7   cm2.The mean   pressure gradient is 40 mmHg.There is severe mitral annular   calcification.There is trace to mild mitralregurgitation.There is mild   mitral   stenosis.The mean pressure gradient is 6 mmHg.Structurally normal   tricuspid   valve.There is trace tricuspid regurgitation.The pulmonary artery   systolic   pressure is estimated to be 49 mmHg.No aortic root dilatation.The   inferior   vena cava is normal in size (<2.1 cm) with normal inspiratory collapse   (>50%)   consistent with normal right atrial pressure.  There is no pericardial   effusion.    < end of copied text >      Cardiac Cath: Surgeon: Dr. Crow Singleton    Requesting Physician: Dr. 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 History was supplemented by son a medical doctor and was at bedside. Patient was [place] back to [place] from [place], when she reported left flank pain with fullness in her abdomen  associated w/nausea that lasted about 30 minutes. Pt was put on 2L NC and PIV placed in flight. Pt noted that her symptoms resolved w/oxygen, IVF, and aspirin 250mg.   Pt denied SOB, productive cough, palpitations, recent illness, hx blood clots, f/c/v/d, abdominal pain, urinary symptoms, change in medications, HA, dizziness, changes in vision. Patient was given aspirin and lovenox at [facility] and CT chest was negative for PE. Patient was transferred to [facility] for management of NSTEMI where she was given Brillinta 180mg, TTE showed AS.   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  Crohns disease  CAD (coronary artery disease)  Aortic stenosis  H/O total hysterectomy  History of bowel resection  S/P drug eluting coronary stent placement      MEDICATIONS  (STANDING):  ALPRAZolam 0.125 milliGRAM(s) Oral every 8 hours  aspirin  chewable 81 milliGRAM(s) Oral daily  atorvastatin 80 milliGRAM(s) Oral at bedtime  chlorhexidine 2% Cloths 1 Application(s) Topical daily  heparin  Infusion 1100 Unit(s)/Hr (11 mL/Hr) IV Continuous <Continuous>  levothyroxine 88 MICROGram(s) Oral daily  melatonin 3 milliGRAM(s) Oral at bedtime  metoprolol tartrate 12.5 milliGRAM(s) Oral every 12 hours  minocycline 100 milliGRAM(s) Oral daily  multivitamin 1 Tablet(s) Oral daily  pantoprazole    Tablet 40 milliGRAM(s) Oral before breakfast  polyethylene glycol 3350 17 Gram(s) Oral daily  ticagrelor 90 milliGRAM(s) Oral two times a day    MEDICATIONS  (PRN):      Allergies    No Known Allergies    Intolerances        SOCIAL HISTORY:  Smoker:  YES / NO        PACK YEARS:                         WHEN QUIT?  ETOH use:  YES / NO               FREQUENCY / QUANTITY:  Ilicit Drug use:  YES / NO  Occupation:  Assisted device use (Cane / Walker):  Live with:    FAMILY HISTORY:  No pertinent family history in first degree relatives      Review of Systems  CONSTITUTIONAL:  Fevers / chills, sweats, fatigue, weight loss, weight gain                                    NEGATIVE  NEURO:  parathesias, seizures, confusion                                                      NEGATIVE  / POSITIVE SYNCOPE  EYES:  Blurry vision, discharge, pain, loss of vision                                                                         NEGATIVE  ENMT:  Difficulty hearing, vertigo, dysphagia, epistaxis, recent dental work                                     NEGATIVE  CV:  Chest pain, palpitations, DELVALLE, orthopnea                                                                                         NEGATIVE  RESPIRATORY:  Wheezing, SOB, cough / sputum, hemoptysis                                                              NEGATIVE  GI:  Nausea, vommiting, diarrhea, constipation, melena                                                                        NEGATIVE  : Hematuria, dysuria, urgency, incontinence                                                                                       NEGATIVE  MUSKULOSKELETAL:  arthritis, joint swelling, muscle weakness                                                           NEGATIVE  SKIN/BREAST:  rash, itching, osmar loss, masses                                                                                            NEGATIVE  PSYCH:  depresion, anxiety, suicidal ideation                                                                                             NEGATIVE  HEME/LYMPH:  bruises easily, enlarged lymph nodes, tender lymph nodes                                        NEGATIVE  ENDOCRINE:  cold intolerance, heat intolerance, polydipsia                                                                   NEGATIVE    PHYSICAL EXAM  Vital Signs Last 24 Hrs  T(C): 36.4 (02 May 2018 10:00), Max: 36.9 (01 May 2018 18:35)  T(F): 97.6 (02 May 2018 10:00), Max: 98.4 (01 May 2018 18:35)  HR: 68 (02 May 2018 16:00) (64 - 82)  BP: 171/89 (02 May 2018 16:00) (132/71 - 198/101)  BP(mean): 123 (02 May 2018 16:00) (88 - 155)  RR: 27 (02 May 2018 16:00) (16 - 27)  SpO2: 98% (02 May 2018 16:00) (95% - 100%)    Physical Exam  CONSTITUTIONAL:                                                      NEUROLOGICAL:  Alert and oriented x3, no gross deficits appreciated                   EYES:      PERRL           ENMT:  supple neck, no lymphadenopathy  CARDIOVASCULAR:     RRR, S1S2, 3/6 murmur LSB  RESPIRATORY:  equal breath sounds, no rales, no rhonchi, no wheeze  GASTROINTESTINAL:  soft, nontender, positive bowel sounds  : JENNINGS + / -  negative  MUSKULOSKELETAL:  moves all extremities  SKIN / BREAST:  no rash seen, mild purplish bruising LLE Lateral aspect  EXTREMITIES:  1+ pitting edema  VASCULAR:    all pulses  intact (radial, femoral, DP/PT)  INCISION: healed laparotomy incision                                        LABS:                        12.2   8.8   )-----------( 136      ( 02 May 2018 11:42 )             37.4     05-02    137  |  100  |  13  ----------------------------<  91  4.0   |  25  |  0.84    Ca    9.0      02 May 2018 11:42  Phos  3.5     05-02  Mg     1.8     05-02    TPro  7.2  /  Alb  3.8  /  TBili  0.7  /  DBili  x   /  AST  26  /  ALT  15  /  AlkPhos  46  05-02    PT/INR - ( 02 May 2018 01:18 )   PT: 11.1 sec;   INR: 1.00          PTT - ( 02 May 2018 14:57 )  PTT:195.6 sec    CARDIAC MARKERS ( 02 May 2018 11:42 )  x     / 0.01 ng/mL / x     / x     / x      CARDIAC MARKERS ( 02 May 2018 01:18 )  x     / 0.02 ng/mL / 47 U/L / x     / 3.7 ng/mL  CARDIAC MARKERS ( 01 May 2018 19:33 )  0.293 ng/mL / x     / x     / x     / x          Thyroid Stimulating Hormone, Serum: 0.205 uIU/mL (05-02 @ 01:18)  Hemoglobin A1C, Whole Blood: 4.9 % (05-02 @ 01:16)    Serum Pro-Brain Natriuretic Peptide: 5694 pg/mL (05-02-18 @ 01:18)      RADIOLOGY & ADDITIONAL STUDIES:  CAROTID U/S:ordered    CXR:  < from: Xray Chest 1 View- PORTABLE-Urgent (05.02.18 @ 02:03) >  History: Chest pain shortness of breath    Mild hypoinflation. Prominent size heart. No focal lung infiltrate or   pleural effusion. Aortic arch vascular calcification.    < end of copied text >    CT Scan: < from: CT Angio Chest w/ IV Cont (05.01.18 @ 23:05) >  IMPRESSION:  1. No evidence of a pulmonary embolism.  2. Interstitial pulmonary edema and trace bilateral pleural effusions.  3. Age-indeterminate T4, T5 and T6 vertebral body compression fracture   deformities.    < end of copied text >      EKG:< from: 12 Lead ECG (05.02.18 @ 06:27) >  Ventricular Rate 67 BPM    Atrial Rate 67 BPM  P-R Interval 140 ms  QRS Duration 96 ms  Q-T Interval 434 ms  QTC Calculation(Bezet) 458 ms  P Axis 61 degrees  R Axis 4 degrees  T Axis 24 degrees  Diagnosis Line Normal sinus rhythm  Minimal voltage criteria for LVH, may be normal variant    < end of copied text >     YASEMIN:< from: Echocardiogram (05.02.18 @ 13:35) >  Normal left ventricular size and wall thickness.There is a septal "knuckle" with no left ventricular outflow obstructionThe left ventricular wall motion is normal.Right atrial size is normal.  The right ventricle is normal in size and function.The right ventricular systolic function is normal. Calcified valve.No aortic regurgitation noted.There is Severe aortic stenosis.The  calculated aortic valve area using the continuity equation is 0.7 cm2.The mean pressure gradient is 40 mmHg.There is severe mitral annular calcification.There is trace to mild mitral  regurgitation.There is mild mitral stenosis.The mean pressure gradient is 6 mmHg.Structurally normal tricuspid valve.There is trace tricuspid regurgitation.The pulmonary artery   systolic pressure is estimated to be 49 mmHg.No aortic root dilatation.The inferior vena cava is normal in size (<2.1 cm) with normal inspiratory collapse (>50%) consistent with normal right atrial   pressure.  There is no pericardial effusion.    < end of copied text >      Cardiac Cath: Surgeon: Dr. Crow Singleton    Requesting Physician: Dr. 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 History was supplemented by son a medical doctor and was at bedside. [place] to [place] from [place], when she reported left flank pain with fullness in her abdomen  associated w/nausea that lasted about 30 minutes. Pt was put on 2L NC and PIV placed in flight. Pt noted that her symptoms resolved w/oxygen, IVF, and aspirin 250mg.   Pt denied SOB, productive cough, palpitations, recent illness, hx blood clots, f/c/v/d, abdominal pain, urinary symptoms, change in medications, HA, dizziness, changes in vision. Patient was given aspirin and lovenox at [facility] and CT chest was negative for PE. Patient was transferred to [facility] for management of NSTEMI where she was given Brillinta 180mg, TTE showed AS.   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  Crohns disease  CAD (coronary artery disease)  Aortic stenosis  H/O total hysterectomy  History of bowel resection  S/P drug eluting coronary stent placement      MEDICATIONS  (STANDING):  ALPRAZolam 0.125 milliGRAM(s) Oral every 8 hours  aspirin  chewable 81 milliGRAM(s) Oral daily  atorvastatin 80 milliGRAM(s) Oral at bedtime  chlorhexidine 2% Cloths 1 Application(s) Topical daily  heparin  Infusion 1100 Unit(s)/Hr (11 mL/Hr) IV Continuous <Continuous>  levothyroxine 88 MICROGram(s) Oral daily  melatonin 3 milliGRAM(s) Oral at bedtime  metoprolol tartrate 12.5 milliGRAM(s) Oral every 12 hours  minocycline 100 milliGRAM(s) Oral daily  multivitamin 1 Tablet(s) Oral daily  pantoprazole    Tablet 40 milliGRAM(s) Oral before breakfast  polyethylene glycol 3350 17 Gram(s) Oral daily  ticagrelor 90 milliGRAM(s) Oral two times a day    MEDICATIONS  (PRN):      Allergies    No Known Allergies    Intolerances        SOCIAL HISTORY:  Smoker:  YES / NO        PACK YEARS:                         WHEN QUIT?  ETOH use:  YES / NO               FREQUENCY / QUANTITY:  Ilicit Drug use:  YES / NO  Occupation:  Assisted device use (Cane / Walker):  Live with:    FAMILY HISTORY:  No pertinent family history in first degree relatives      Review of Systems  CONSTITUTIONAL:  Fevers / chills, sweats, fatigue, weight loss, weight gain                                    NEGATIVE  NEURO:  parathesias, seizures, confusion                                                      NEGATIVE  / POSITIVE Syncope (1 mth ago), Dizziness on ambulation  EYES:  Blurry vision, discharge, pain, loss of vision                                                                         NEGATIVE  ENMT:  Difficulty hearing, vertigo, dysphagia, epistaxis, recent dental work                                     NEGATIVE  CV:  Chest pain, palpitations, DELVALLE, orthopnea                                                                                    NEGATIVE  RESPIRATORY:  Wheezing, SOB, cough / sputum, hemoptysis                                                              NEGATIVE  GI:  Nausea, vomiting, diarrhea, constipation, melena                                                                        NEGATIVE  : Hematuria, dysuria, urgency, incontinence                                                                                       NEGATIVE  MUSKULOSKELETAL:  arthritis, joint swelling, muscle weakness                                                           NEGATIVE  SKIN/BREAST:  rash, itching, hair loss, masses                                                                                            NEGATIVE  PSYCH:  depression, anxiety, suicidal ideation                                                                                             NEGATIVE  HEME/LYMPH:  bruises easily, enlarged lymph nodes, tender lymph nodes                                        NEGATIVE  ENDOCRINE:  cold intolerance, heat intolerance, polydipsia                                                                   NEGATIVE    PHYSICAL EXAM  Vital Signs Last 24 Hrs  T(C): 36.4 (02 May 2018 10:00), Max: 36.9 (01 May 2018 18:35)  T(F): 97.6 (02 May 2018 10:00), Max: 98.4 (01 May 2018 18:35)  HR: 68 (02 May 2018 16:00) (64 - 82)  BP: 171/89 (02 May 2018 16:00) (132/71 - 198/101)  BP(mean): 123 (02 May 2018 16:00) (88 - 155)  RR: 27 (02 May 2018 16:00) (16 - 27)  SpO2: 98% (02 May 2018 16:00) (95% - 100%)    Physical Exam  CONSTITUTIONAL:                                                      NEUROLOGICAL:  Alert and oriented x3, no gross deficits appreciated                   EYES:      PERRL           ENMT:  supple neck, no lymphadenopathy  CARDIOVASCULAR:     RRR, S1S2, 3/6 murmur LSB  RESPIRATORY:  equal breath sounds, no rales, no rhonchi, no wheeze  GASTROINTESTINAL:  soft, nontender, positive bowel sounds  : JENNINGS + / -  negative  MUSKULOSKELETAL:  moves all extremities  SKIN / BREAST:  no rash seen, mild purplish bruising LLE Lateral aspect  EXTREMITIES:  1+ pitting edema  VASCULAR:    all pulses  intact (radial, femoral, DP/PT)  INCISION: healed laparotomy incision                                        LABS:                        12.2   8.8   )-----------( 136      ( 02 May 2018 11:42 )             37.4     05-02    137  |  100  |  13  ----------------------------<  91  4.0   |  25  |  0.84    Ca    9.0      02 May 2018 11:42  Phos  3.5     05-02  Mg     1.8     05-02    TPro  7.2  /  Alb  3.8  /  TBili  0.7  /  DBili  x   /  AST  26  /  ALT  15  /  AlkPhos  46  05-02    PT/INR - ( 02 May 2018 01:18 )   PT: 11.1 sec;   INR: 1.00          PTT - ( 02 May 2018 14:57 )  PTT:195.6 sec    CARDIAC MARKERS ( 02 May 2018 11:42 )  x     / 0.01 ng/mL / x     / x     / x      CARDIAC MARKERS ( 02 May 2018 01:18 )  x     / 0.02 ng/mL / 47 U/L / x     / 3.7 ng/mL  CARDIAC MARKERS ( 01 May 2018 19:33 )  0.293 ng/mL / x     / x     / x     / x          Thyroid Stimulating Hormone, Serum: 0.205 uIU/mL (05-02 @ 01:18)  Hemoglobin A1C, Whole Blood: 4.9 % (05-02 @ 01:16)    Serum Pro-Brain Natriuretic Peptide: 5694 pg/mL (05-02-18 @ 01:18)      RADIOLOGY & ADDITIONAL STUDIES:  CAROTID U/S:ordered    CXR:  < from: Xray Chest 1 View- PORTABLE-Urgent (05.02.18 @ 02:03) >  History: Chest pain shortness of breath    Mild hypoinflation. Prominent size heart. No focal lung infiltrate or   pleural effusion. Aortic arch vascular calcification.    < end of copied text >    CT Scan: < from: CT Angio Chest w/ IV Cont (05.01.18 @ 23:05) >  IMPRESSION:  1. No evidence of a pulmonary embolism.  2. Interstitial pulmonary edema and trace bilateral pleural effusions.  3. Age-indeterminate T4, T5 and T6 vertebral body compression fracture   deformities.    < end of copied text >      EKG:< from: 12 Lead ECG (05.02.18 @ 06:27) >  Ventricular Rate 67 BPM    Atrial Rate 67 BPM  P-R Interval 140 ms  QRS Duration 96 ms  Q-T Interval 434 ms  QTC Calculation(Bezet) 458 ms  P Axis 61 degrees  R Axis 4 degrees  T Axis 24 degrees  Diagnosis Line Normal sinus rhythm  Minimal voltage criteria for LVH, may be normal variant    < end of copied text >     YASEMIN:< from: Echocardiogram (05.02.18 @ 13:35) >  Normal left ventricular size and wall thickness.There is a septal "knuckle" with no left ventricular outflow obstructionThe left ventricular wall motion is normal.Right atrial size is normal.  The right ventricle is normal in size and function.The right ventricular systolic function is normal. Calcified valve.No aortic regurgitation noted.There is Severe aortic stenosis.The  calculated aortic valve area using the continuity equation is 0.7 cm2.The mean pressure gradient is 40 mmHg.There is severe mitral annular calcification.There is trace to mild mitral  regurgitation.There is mild mitral stenosis.The mean pressure gradient is 6 mmHg.Structurally normal tricuspid valve.There is trace tricuspid regurgitation.The pulmonary artery   systolic pressure is estimated to be 49 mmHg.No aortic root dilatation.The inferior vena cava is normal in size (<2.1 cm) with normal inspiratory collapse (>50%) consistent with normal right atrial   pressure.  There is no pericardial effusion.    < end of copied text >      Cardiac Cath: Surgeon: Dr. Crow Singleton    Requesting Physician: Dr. 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 History was supplemented by son a medical doctor and was at bedside. Patient was flying back to [place] from [place], when she reported left flank pain with fullness in her abdomen  associated w/nausea that lasted about 30 minutes. Pt was put on 2L NC and PIV placed in flight. Pt noted that her symptoms resolved w/oxygen, IVF, and aspirin 250mg.   Pt denied SOB, productive cough, palpitations, recent illness, hx blood clots, f/c/v/d, abdominal pain, urinary symptoms, change in medications, HA, dizziness, changes in vision. Patient was given aspirin and lovenox at [facility] and CT chest was negative for PE. Patient was transferred to [facility] for management of NSTEMI where she was given Brillinta 180mg, TTE showed AS.   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  Crohns disease  CAD (coronary artery disease)  Aortic stenosis  H/O total hysterectomy  History of bowel resection  S/P drug eluting coronary stent placement      MEDICATIONS  (STANDING):  ALPRAZolam 0.125 milliGRAM(s) Oral every 8 hours  aspirin  chewable 81 milliGRAM(s) Oral daily  atorvastatin 80 milliGRAM(s) Oral at bedtime  chlorhexidine 2% Cloths 1 Application(s) Topical daily  heparin  Infusion 1100 Unit(s)/Hr (11 mL/Hr) IV Continuous <Continuous>  levothyroxine 88 MICROGram(s) Oral daily  melatonin 3 milliGRAM(s) Oral at bedtime  metoprolol tartrate 12.5 milliGRAM(s) Oral every 12 hours  minocycline 100 milliGRAM(s) Oral daily  multivitamin 1 Tablet(s) Oral daily  pantoprazole    Tablet 40 milliGRAM(s) Oral before breakfast  polyethylene glycol 3350 17 Gram(s) Oral daily  ticagrelor 90 milliGRAM(s) Oral two times a day    MEDICATIONS  (PRN):      Allergies    No Known Allergies    Intolerances        SOCIAL HISTORY:  Smoker:   NO        PACK YEARS:                         WHEN QUIT?  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Cane)  Live with: Assisted living center    FAMILY HISTORY:  No pertinent family history in first degree relatives      Review of Systems  CONSTITUTIONAL:  Fevers / chills, sweats, fatigue, weight loss, weight gain                                    NEGATIVE  NEURO:  parathesias, seizures, confusion                                                      NEGATIVE  / POSITIVE Syncope (1 mth ago), Dizziness on ambulation  EYES:  Blurry vision, discharge, pain, loss of vision                                                                         NEGATIVE  ENMT:  Difficulty hearing, vertigo, dysphagia, epistaxis, recent dental work                                     NEGATIVE  CV:  Chest pain, palpitations, DELVALLE, orthopnea                                                                                    NEGATIVE  RESPIRATORY:  Wheezing, SOB, cough / sputum, hemoptysis                                                              NEGATIVE  GI:  Nausea, vomiting, diarrhea, constipation, melena                                                                        NEGATIVE  : Hematuria, dysuria, urgency, incontinence                                                                                       NEGATIVE  MUSKULOSKELETAL:  arthritis, joint swelling, muscle weakness                                                           NEGATIVE  SKIN/BREAST:  rash, itching, hair loss, masses                                                                                            NEGATIVE  PSYCH:  depression, anxiety, suicidal ideation                                                                                             NEGATIVE  HEME/LYMPH:  bruises easily, enlarged lymph nodes, tender lymph nodes                                        NEGATIVE  ENDOCRINE:  cold intolerance, heat intolerance, polydipsia                                                                   NEGATIVE    PHYSICAL EXAM  Vital Signs Last 24 Hrs  T(C): 36.4 (02 May 2018 10:00), Max: 36.9 (01 May 2018 18:35)  T(F): 97.6 (02 May 2018 10:00), Max: 98.4 (01 May 2018 18:35)  HR: 68 (02 May 2018 16:00) (64 - 82)  BP: 171/89 (02 May 2018 16:00) (132/71 - 198/101)  BP(mean): 123 (02 May 2018 16:00) (88 - 155)  RR: 27 (02 May 2018 16:00) (16 - 27)  SpO2: 98% (02 May 2018 16:00) (95% - 100%)    Physical Exam  CONSTITUTIONAL:                                                      NEUROLOGICAL:  Alert and oriented x3, no gross deficits appreciated                   EYES:      PERRL           ENMT:  supple neck, no lymphadenopathy  CARDIOVASCULAR:     RRR, S1S2, 3/6 murmur LSB  RESPIRATORY:  equal breath sounds, no rales, no rhonchi, no wheeze  GASTROINTESTINAL:  soft, nontender, positive bowel sounds  : JENNINGS + / -  negative  MUSKULOSKELETAL:  moves all extremities  SKIN / BREAST:  no rash seen, mild purplish bruising LLE Lateral aspect  EXTREMITIES:  1+ pitting edema  VASCULAR:    all pulses  intact (radial, femoral, DP/PT)  INCISION: healed laparotomy incision                                        LABS:                        12.2   8.8   )-----------( 136      ( 02 May 2018 11:42 )             37.4     05-02    137  |  100  |  13  ----------------------------<  91  4.0   |  25  |  0.84    Ca    9.0      02 May 2018 11:42  Phos  3.5     05-02  Mg     1.8     05-02    TPro  7.2  /  Alb  3.8  /  TBili  0.7  /  DBili  x   /  AST  26  /  ALT  15  /  AlkPhos  46  05-02    PT/INR - ( 02 May 2018 01:18 )   PT: 11.1 sec;   INR: 1.00          PTT - ( 02 May 2018 14:57 )  PTT:195.6 sec    CARDIAC MARKERS ( 02 May 2018 11:42 )  x     / 0.01 ng/mL / x     / x     / x      CARDIAC MARKERS ( 02 May 2018 01:18 )  x     / 0.02 ng/mL / 47 U/L / x     / 3.7 ng/mL  CARDIAC MARKERS ( 01 May 2018 19:33 )  0.293 ng/mL / x     / x     / x     / x          Thyroid Stimulating Hormone, Serum: 0.205 uIU/mL (05-02 @ 01:18)  Hemoglobin A1C, Whole Blood: 4.9 % (05-02 @ 01:16)    Serum Pro-Brain Natriuretic Peptide: 5694 pg/mL (05-02-18 @ 01:18)      RADIOLOGY & ADDITIONAL STUDIES:  CAROTID U/S:ordered    CXR:  < from: Xray Chest 1 View- PORTABLE-Urgent (05.02.18 @ 02:03) >  History: Chest pain shortness of breath    Mild hypoinflation. Prominent size heart. No focal lung infiltrate or   pleural effusion. Aortic arch vascular calcification.    < end of copied text >    CT Scan: < from: CT Angio Chest w/ IV Cont (05.01.18 @ 23:05) >  IMPRESSION:  1. No evidence of a pulmonary embolism.  2. Interstitial pulmonary edema and trace bilateral pleural effusions.  3. Age-indeterminate T4, T5 and T6 vertebral body compression fracture   deformities.    < end of copied text >      EKG:< from: 12 Lead ECG (05.02.18 @ 06:27) >  Ventricular Rate 67 BPM    Atrial Rate 67 BPM  P-R Interval 140 ms  QRS Duration 96 ms  Q-T Interval 434 ms  QTC Calculation(Bezet) 458 ms  P Axis 61 degrees  R Axis 4 degrees  T Axis 24 degrees  Diagnosis Line Normal sinus rhythm  Minimal voltage criteria for LVH, may be normal variant    < end of copied text >     YASEMIN:< from: Echocardiogram (05.02.18 @ 13:35) >  Normal left ventricular size and wall thickness.There is a septal "knuckle" with no left ventricular outflow obstructionThe left ventricular wall motion is normal.Right atrial size is normal.  The right ventricle is normal in size and function.The right ventricular systolic function is normal. Calcified valve.No aortic regurgitation noted.There is Severe aortic stenosis.The  calculated aortic valve area using the continuity equation is 0.7 cm2.The mean pressure gradient is 40 mmHg.There is severe mitral annular calcification.There is trace to mild mitral  regurgitation.There is mild mitral stenosis.The mean pressure gradient is 6 mmHg.Structurally normal tricuspid valve.There is trace tricuspid regurgitation.The pulmonary artery   systolic pressure is estimated to be 49 mmHg.No aortic root dilatation.The inferior vena cava is normal in size (<2.1 cm) with normal inspiratory collapse (>50%) consistent with normal right atrial   pressure.  There is no pericardial effusion.    < end of copied text >      Cardiac Cath:

## 2018-05-02 NOTE — H&P ADULT - ATTENDING COMMENTS
89F with CAD s/p MAIA x3 in 2012 c/b GI bleed while on Plavix, Severe Aortic Stenosis, Syncope s/p loop recorder (pt unsure of when/where placed and by whom), Crohn's Disease c/b fistula s/p partial bowel resection c/b MRSA infection - now on chronic suppressive Minocycline therapy, Essential HTN, and Hypothyroidism presented to [facility] ED after transatlantic flight from [facility] c/o acute onset SSCP, diagnosed with NSTEMI trop I 0.293. CTA negative for PE. Patient transferred to [facility] CCU for further evaluation.     This morning, patient is asymptomatic. Able to lay at zero degrees in bed during AM rounds with O2 sat % on RA.     Vitals, labs, EKG and imaging reviewed 5/2  Exam notable for elderly female laying flat in bed in NAD, speaking comfortably, no supplemental O2 in place, JVP 10, RRR, II/VI systolic murmur at LSB, clear lungs, abd NTTP, scattered bruising along venipuncture sites, trace to 1+ b/l PINA, extremities WWP, A&Ox3    -Administer Lasix 20mg IV x1  -Heparin gtt x 48hr per ACS protocol  -3pt trop trend to peak  -Metoprolol 12.5 BID  -s/p ASA and Brilinta load (family opted for Brilinta trial, no retrial of Plavix)  -->Closely monitoring for bleeding given h/o GIB on DAPT  -Atorva 80 qHS  -Hold vasodilators and ACE/ARB given severe AS  -Obtain TTE AS protocol  -Proceed with R/LHC and Ao gradient assessment, pt NPO  -Structural heart team c/s pending imaging and cath results  -->Discuss implications of chronic MRSA infection on potential AVR  -EP c/s for loop recorder interrogation  -Administer home qoweek Humira for Crohn's disease (dose was due Mon 4/30, dose missed)  -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

## 2018-05-02 NOTE — H&P ADULT - ASSESSMENT
89 y/o F w/PMH of CAD s/p MAIA x3 in 2012 c/b GI bleed while on Plavix, AS, syncope s/p loop recorder, Crohn's c/b fistula and MRSA infection s/p partial bowel resection, HTN, and hypothyroidism presented to [facility] ED c/o acute onset chest pain, found to have NSTEMI and transferred to [facility] CCU for further management.

## 2018-05-02 NOTE — H&P ADULT - HISTORY OF PRESENT ILLNESS
89 y/o F w/PMH of CAD s/p MAIA x3 in 2012 c/b GI bleed, AS, Crohn's s/p partial bowel resection c/b MRSA infection, HTN, and hypothyroidism presented to [facility] ED c/o acute onset chest pain. Pt was flying back to [place] from [place], when she reported mild CP that started at base of lungs and progressed to center of chest. CP radiated to back and was associated w/nausea that lasted about 30 minutes. Pt noted that her sx resolved w/oxygen, IVF, and aspirin that was given to her on the plane. Pt denied SOB, productive cough, palpitations, recent illness, hx blood clots, f/c/v/d, abdominal pain, urinary symptoms, change in medications, HA, dizziness, changes in vision.    In  ED, T 98.4, HR 68, /84, RR 18, O2 96% on RA. Labs s/f D-dimer 461, trop I 0.293. EKG s/f . CTA chest prelim read by  rads resident did not show PE. Pt given ASA 81mg and Lovenox 60mg. 89 y/o F w/PMH of CAD s/p MAIA x3 in 2012 c/b GI bleed while on Plavix, AS, palpitations s/p loop recorder, Crohn's c/b fistula and MRSA infection s/p partial bowel resection, HTN, and hypothyroidism presented to [facility] ED c/o acute onset chest pain. Hx supplemented by son, who was at bedside. Pt was flying back to [place] from [place], when she reported mild CP that started at base of lungs and progressed to center of chest. CP radiated to back and was associated w/nausea that lasted about 30 minutes. Pt was put on 2L NC and PIV placed in flight. Pt noted that her sx resolved w/oxygen, IVF, and aspirin 250mg. Pt denied SOB, productive cough, palpitations, recent illness, hx blood clots, f/c/v/d, abdominal pain, urinary symptoms, change in medications, HA, dizziness, changes in vision.    In  ED, T 98.4, HR 68, /84, RR 18, O2 96% on RA. Labs s/f D-dimer 461, trop I 0.293. EKG s/f NSR. CTA chest neg for PE. Pt given ASA 81mg and Lovenox 60mg.    Upon arrival to CCU, VS s/f /88. Pt given Lopressor 25mg PO with improvement of SBP to 150s. Anticoagulation and indication for PCI was discussed with pt and family, who was concerned about hx of GI bleed on Plavix; however, pt and family would like to proceed with alternative A/C medication despite risks of bleeding. Pt was Brilinta loaded with plan for hep gtt and cardiac cath in AM.

## 2018-05-02 NOTE — CHART NOTE - NSCHARTNOTEFT_GEN_A_CORE
PGY-2 Event Note  Spoke with patient's ID physician, Dr Manley. 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 The entire surgical mesh could not be removed surgically, so the patient has been on daily suppressive minocycline, and it should be continued. He reports that from his standpoint, there are non contraindications for cardiac catheterization or TAVR. PGY-2 Event Note  Spoke with patient's ID physician, Dr Manley. 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 The entire surgical mesh could not be removed surgically, so the patient has been on daily suppressive minocycline, and it should be continued. He reports that from his standpoint, there are non contraindications for cardiac catheterization or TAVR.

## 2018-05-02 NOTE — PROVIDER CONTACT NOTE (OTHER) - SITUATION
Patient became nauseous, dizzy, and bradycardic after standing up to go from chair to bed. HR 50s, high 40s, sinus. FS 99.

## 2018-05-03 ENCOUNTER — APPOINTMENT (OUTPATIENT)
Dept: CARDIOTHORACIC SURGERY | Facility: HOSPITAL | Age: 83
End: 2018-05-03

## 2018-05-03 DIAGNOSIS — E03.9 HYPOTHYROIDISM, UNSPECIFIED: ICD-10-CM

## 2018-05-03 DIAGNOSIS — I25.10 ATHEROSCLEROTIC HEART DISEASE OF NATIVE CORONARY ARTERY WITHOUT ANGINA PECTORIS: ICD-10-CM

## 2018-05-03 DIAGNOSIS — I35.0 NONRHEUMATIC AORTIC (VALVE) STENOSIS: ICD-10-CM

## 2018-05-03 DIAGNOSIS — K50.919 CROHN'S DISEASE, UNSPECIFIED, WITH UNSPECIFIED COMPLICATIONS: ICD-10-CM

## 2018-05-03 PROBLEM — Z00.00 ENCOUNTER FOR PREVENTIVE HEALTH EXAMINATION: Status: ACTIVE | Noted: 2018-05-03

## 2018-05-03 LAB
ALBUMIN SERPL ELPH-MCNC: 3.5 G/DL — SIGNIFICANT CHANGE UP (ref 3.3–5)
ALP SERPL-CCNC: 45 U/L — SIGNIFICANT CHANGE UP (ref 40–120)
ALT FLD-CCNC: 14 U/L — SIGNIFICANT CHANGE UP (ref 10–45)
ANION GAP SERPL CALC-SCNC: 10 MMOL/L — SIGNIFICANT CHANGE UP (ref 5–17)
ANION GAP SERPL CALC-SCNC: 13 MMOL/L — SIGNIFICANT CHANGE UP (ref 5–17)
ANION GAP SERPL CALC-SCNC: 15 MMOL/L — SIGNIFICANT CHANGE UP (ref 5–17)
APPEARANCE UR: CLEAR — SIGNIFICANT CHANGE UP
APTT BLD: 34.7 SEC — SIGNIFICANT CHANGE UP (ref 27.5–37.4)
APTT BLD: 76.9 SEC — HIGH (ref 27.5–37.4)
APTT BLD: 96.7 SEC — HIGH (ref 27.5–37.4)
AST SERPL-CCNC: 23 U/L — SIGNIFICANT CHANGE UP (ref 10–40)
BILIRUB SERPL-MCNC: 0.9 MG/DL — SIGNIFICANT CHANGE UP (ref 0.2–1.2)
BILIRUB UR-MCNC: NEGATIVE — SIGNIFICANT CHANGE UP
BUN SERPL-MCNC: 13 MG/DL — SIGNIFICANT CHANGE UP (ref 7–23)
BUN SERPL-MCNC: 14 MG/DL — SIGNIFICANT CHANGE UP (ref 7–23)
BUN SERPL-MCNC: 15 MG/DL — SIGNIFICANT CHANGE UP (ref 7–23)
CALCIUM SERPL-MCNC: 8.4 MG/DL — SIGNIFICANT CHANGE UP (ref 8.4–10.5)
CALCIUM SERPL-MCNC: 8.6 MG/DL — SIGNIFICANT CHANGE UP (ref 8.4–10.5)
CALCIUM SERPL-MCNC: 8.6 MG/DL — SIGNIFICANT CHANGE UP (ref 8.4–10.5)
CHLORIDE SERPL-SCNC: 102 MMOL/L — SIGNIFICANT CHANGE UP (ref 96–108)
CHLORIDE SERPL-SCNC: 103 MMOL/L — SIGNIFICANT CHANGE UP (ref 96–108)
CHLORIDE SERPL-SCNC: 96 MMOL/L — SIGNIFICANT CHANGE UP (ref 96–108)
CK MB CFR SERPL CALC: 4 NG/ML — SIGNIFICANT CHANGE UP (ref 0–6.7)
CK SERPL-CCNC: 80 U/L — SIGNIFICANT CHANGE UP (ref 25–170)
CO2 SERPL-SCNC: 22 MMOL/L — SIGNIFICANT CHANGE UP (ref 22–31)
CO2 SERPL-SCNC: 23 MMOL/L — SIGNIFICANT CHANGE UP (ref 22–31)
CO2 SERPL-SCNC: 26 MMOL/L — SIGNIFICANT CHANGE UP (ref 22–31)
COLOR SPEC: YELLOW — SIGNIFICANT CHANGE UP
CREAT SERPL-MCNC: 0.89 MG/DL — SIGNIFICANT CHANGE UP (ref 0.5–1.3)
CREAT SERPL-MCNC: 0.95 MG/DL — SIGNIFICANT CHANGE UP (ref 0.5–1.3)
CREAT SERPL-MCNC: 0.96 MG/DL — SIGNIFICANT CHANGE UP (ref 0.5–1.3)
DIFF PNL FLD: NEGATIVE — SIGNIFICANT CHANGE UP
GAS PNL BLDA: SIGNIFICANT CHANGE UP
GLUCOSE SERPL-MCNC: 241 MG/DL — HIGH (ref 70–99)
GLUCOSE SERPL-MCNC: 92 MG/DL — SIGNIFICANT CHANGE UP (ref 70–99)
GLUCOSE SERPL-MCNC: 94 MG/DL — SIGNIFICANT CHANGE UP (ref 70–99)
GLUCOSE UR QL: NEGATIVE — SIGNIFICANT CHANGE UP
HCT VFR BLD CALC: 34.5 % — SIGNIFICANT CHANGE UP (ref 34.5–45)
HCT VFR BLD CALC: 36.2 % — SIGNIFICANT CHANGE UP (ref 34.5–45)
HCT VFR BLD CALC: 38.2 % — SIGNIFICANT CHANGE UP (ref 34.5–45)
HGB BLD-MCNC: 11.1 G/DL — LOW (ref 11.5–15.5)
HGB BLD-MCNC: 11.7 G/DL — SIGNIFICANT CHANGE UP (ref 11.5–15.5)
HGB BLD-MCNC: 12.7 G/DL — SIGNIFICANT CHANGE UP (ref 11.5–15.5)
INR BLD: 1.09 — SIGNIFICANT CHANGE UP (ref 0.88–1.16)
INR BLD: 1.09 — SIGNIFICANT CHANGE UP (ref 0.88–1.16)
KETONES UR-MCNC: NEGATIVE — SIGNIFICANT CHANGE UP
LEUKOCYTE ESTERASE UR-ACNC: (no result)
MAGNESIUM SERPL-MCNC: 2 MG/DL — SIGNIFICANT CHANGE UP (ref 1.6–2.6)
MAGNESIUM SERPL-MCNC: 2.1 MG/DL — SIGNIFICANT CHANGE UP (ref 1.6–2.6)
MCHC RBC-ENTMCNC: 29 PG — SIGNIFICANT CHANGE UP (ref 27–34)
MCHC RBC-ENTMCNC: 29.4 PG — SIGNIFICANT CHANGE UP (ref 27–34)
MCHC RBC-ENTMCNC: 29.8 PG — SIGNIFICANT CHANGE UP (ref 27–34)
MCHC RBC-ENTMCNC: 32.2 G/DL — SIGNIFICANT CHANGE UP (ref 32–36)
MCHC RBC-ENTMCNC: 32.3 G/DL — SIGNIFICANT CHANGE UP (ref 32–36)
MCHC RBC-ENTMCNC: 33.2 G/DL — SIGNIFICANT CHANGE UP (ref 32–36)
MCV RBC AUTO: 89.7 FL — SIGNIFICANT CHANGE UP (ref 80–100)
MCV RBC AUTO: 90.1 FL — SIGNIFICANT CHANGE UP (ref 80–100)
MCV RBC AUTO: 91 FL — SIGNIFICANT CHANGE UP (ref 80–100)
NITRITE UR-MCNC: POSITIVE
PH UR: 6.5 — SIGNIFICANT CHANGE UP (ref 5–8)
PLATELET # BLD AUTO: 115 K/UL — LOW (ref 150–400)
PLATELET # BLD AUTO: 124 K/UL — LOW (ref 150–400)
PLATELET # BLD AUTO: 95 K/UL — LOW (ref 150–400)
POTASSIUM SERPL-MCNC: 3.5 MMOL/L — SIGNIFICANT CHANGE UP (ref 3.5–5.3)
POTASSIUM SERPL-MCNC: 4.3 MMOL/L — SIGNIFICANT CHANGE UP (ref 3.5–5.3)
POTASSIUM SERPL-MCNC: 4.5 MMOL/L — SIGNIFICANT CHANGE UP (ref 3.5–5.3)
POTASSIUM SERPL-SCNC: 3.5 MMOL/L — SIGNIFICANT CHANGE UP (ref 3.5–5.3)
POTASSIUM SERPL-SCNC: 4.3 MMOL/L — SIGNIFICANT CHANGE UP (ref 3.5–5.3)
POTASSIUM SERPL-SCNC: 4.5 MMOL/L — SIGNIFICANT CHANGE UP (ref 3.5–5.3)
PROT SERPL-MCNC: 6.2 G/DL — SIGNIFICANT CHANGE UP (ref 6–8.3)
PROT UR-MCNC: NEGATIVE MG/DL — SIGNIFICANT CHANGE UP
PROTHROM AB SERPL-ACNC: 12.1 SEC — SIGNIFICANT CHANGE UP (ref 9.8–12.7)
PROTHROM AB SERPL-ACNC: 12.1 SEC — SIGNIFICANT CHANGE UP (ref 9.8–12.7)
RBC # BLD: 3.83 M/UL — SIGNIFICANT CHANGE UP (ref 3.8–5.2)
RBC # BLD: 3.98 M/UL — SIGNIFICANT CHANGE UP (ref 3.8–5.2)
RBC # BLD: 4.26 M/UL — SIGNIFICANT CHANGE UP (ref 3.8–5.2)
RBC # FLD: 13.8 % — SIGNIFICANT CHANGE UP (ref 10.3–16.9)
RBC # FLD: 14.1 % — SIGNIFICANT CHANGE UP (ref 10.3–16.9)
RBC # FLD: 14.2 % — SIGNIFICANT CHANGE UP (ref 10.3–16.9)
SODIUM SERPL-SCNC: 134 MMOL/L — LOW (ref 135–145)
SODIUM SERPL-SCNC: 138 MMOL/L — SIGNIFICANT CHANGE UP (ref 135–145)
SODIUM SERPL-SCNC: 138 MMOL/L — SIGNIFICANT CHANGE UP (ref 135–145)
SP GR SPEC: <=1.005 — SIGNIFICANT CHANGE UP (ref 1–1.03)
TROPONIN T SERPL-MCNC: <0.01 NG/ML — SIGNIFICANT CHANGE UP (ref 0–0.01)
UROBILINOGEN FLD QL: 0.2 E.U./DL — SIGNIFICANT CHANGE UP
WBC # BLD: 6 K/UL — SIGNIFICANT CHANGE UP (ref 3.8–10.5)
WBC # BLD: 6.7 K/UL — SIGNIFICANT CHANGE UP (ref 3.8–10.5)
WBC # BLD: 7.6 K/UL — SIGNIFICANT CHANGE UP (ref 3.8–10.5)
WBC # FLD AUTO: 6 K/UL — SIGNIFICANT CHANGE UP (ref 3.8–10.5)
WBC # FLD AUTO: 6.7 K/UL — SIGNIFICANT CHANGE UP (ref 3.8–10.5)
WBC # FLD AUTO: 7.6 K/UL — SIGNIFICANT CHANGE UP (ref 3.8–10.5)

## 2018-05-03 PROCEDURE — 36569 INSJ PICC 5 YR+ W/O IMAGING: CPT

## 2018-05-03 PROCEDURE — 75573 CT HRT C+ STRUX CGEN HRT DS: CPT | Mod: 26

## 2018-05-03 PROCEDURE — 74174 CTA ABD&PLVS W/CONTRAST: CPT | Mod: 26

## 2018-05-03 PROCEDURE — 99291 CRITICAL CARE FIRST HOUR: CPT

## 2018-05-03 PROCEDURE — 70450 CT HEAD/BRAIN W/O DYE: CPT | Mod: 26

## 2018-05-03 PROCEDURE — 76937 US GUIDE VASCULAR ACCESS: CPT | Mod: 26

## 2018-05-03 PROCEDURE — 93458 L HRT ARTERY/VENTRICLE ANGIO: CPT | Mod: 26

## 2018-05-03 PROCEDURE — 94010 BREATHING CAPACITY TEST: CPT | Mod: 26

## 2018-05-03 PROCEDURE — 71045 X-RAY EXAM CHEST 1 VIEW: CPT | Mod: 26,76

## 2018-05-03 PROCEDURE — 92986 REVISION OF AORTIC VALVE: CPT

## 2018-05-03 PROCEDURE — 93010 ELECTROCARDIOGRAM REPORT: CPT

## 2018-05-03 RX ORDER — MEPERIDINE HYDROCHLORIDE 50 MG/ML
25 INJECTION INTRAMUSCULAR; INTRAVENOUS; SUBCUTANEOUS ONCE
Qty: 0 | Refills: 0 | Status: DISCONTINUED | OUTPATIENT
Start: 2018-05-03 | End: 2018-05-04

## 2018-05-03 RX ORDER — SODIUM CHLORIDE 9 MG/ML
20 INJECTION INTRAMUSCULAR; INTRAVENOUS; SUBCUTANEOUS ONCE
Qty: 0 | Refills: 0 | Status: DISCONTINUED | OUTPATIENT
Start: 2018-05-03 | End: 2018-05-05

## 2018-05-03 RX ORDER — CHLORHEXIDINE GLUCONATE 213 G/1000ML
5 SOLUTION TOPICAL EVERY 4 HOURS
Qty: 0 | Refills: 0 | Status: DISCONTINUED | OUTPATIENT
Start: 2018-05-03 | End: 2018-05-04

## 2018-05-03 RX ORDER — ADALIMUMAB 40MG/0.8ML
40 KIT SUBCUTANEOUS EVERY 2 WEEKS
Qty: 0 | Refills: 0 | Status: DISCONTINUED | OUTPATIENT
Start: 2018-05-03 | End: 2018-05-05

## 2018-05-03 RX ORDER — METOPROLOL TARTRATE 50 MG
12.5 TABLET ORAL EVERY 12 HOURS
Qty: 0 | Refills: 0 | Status: DISCONTINUED | OUTPATIENT
Start: 2018-05-03 | End: 2018-05-03

## 2018-05-03 RX ORDER — SODIUM CHLORIDE 9 MG/ML
10 INJECTION INTRAMUSCULAR; INTRAVENOUS; SUBCUTANEOUS
Qty: 0 | Refills: 0 | Status: DISCONTINUED | OUTPATIENT
Start: 2018-05-03 | End: 2018-05-05

## 2018-05-03 RX ORDER — HEPARIN SODIUM 5000 [USP'U]/ML
600 INJECTION INTRAVENOUS; SUBCUTANEOUS
Qty: 25000 | Refills: 0 | Status: DISCONTINUED | OUTPATIENT
Start: 2018-05-03 | End: 2018-05-03

## 2018-05-03 RX ORDER — HEPARIN SODIUM 5000 [USP'U]/ML
450 INJECTION INTRAVENOUS; SUBCUTANEOUS
Qty: 25000 | Refills: 0 | Status: DISCONTINUED | OUTPATIENT
Start: 2018-05-03 | End: 2018-05-03

## 2018-05-03 RX ORDER — POTASSIUM CHLORIDE 20 MEQ
40 PACKET (EA) ORAL EVERY 4 HOURS
Qty: 0 | Refills: 0 | Status: COMPLETED | OUTPATIENT
Start: 2018-05-03 | End: 2018-05-03

## 2018-05-03 RX ORDER — HEPARIN SODIUM 5000 [USP'U]/ML
5000 INJECTION INTRAVENOUS; SUBCUTANEOUS EVERY 8 HOURS
Qty: 0 | Refills: 0 | Status: DISCONTINUED | OUTPATIENT
Start: 2018-05-03 | End: 2018-05-04

## 2018-05-03 RX ORDER — SODIUM CHLORIDE 9 MG/ML
10 INJECTION INTRAMUSCULAR; INTRAVENOUS; SUBCUTANEOUS EVERY 12 HOURS
Qty: 0 | Refills: 0 | Status: DISCONTINUED | OUTPATIENT
Start: 2018-05-03 | End: 2018-05-05

## 2018-05-03 RX ORDER — CEFAZOLIN SODIUM 1 G
2000 VIAL (EA) INJECTION EVERY 8 HOURS
Qty: 0 | Refills: 0 | Status: COMPLETED | OUTPATIENT
Start: 2018-05-03 | End: 2018-05-04

## 2018-05-03 RX ORDER — SODIUM CHLORIDE 9 MG/ML
1000 INJECTION INTRAMUSCULAR; INTRAVENOUS; SUBCUTANEOUS
Qty: 0 | Refills: 0 | Status: DISCONTINUED | OUTPATIENT
Start: 2018-05-03 | End: 2018-05-04

## 2018-05-03 RX ADMIN — Medication 1 TABLET(S): at 11:36

## 2018-05-03 RX ADMIN — Medication 12.5 MILLIGRAM(S): at 13:35

## 2018-05-03 RX ADMIN — CHLORHEXIDINE GLUCONATE 5 MILLILITER(S): 213 SOLUTION TOPICAL at 11:36

## 2018-05-03 RX ADMIN — ATORVASTATIN CALCIUM 80 MILLIGRAM(S): 80 TABLET, FILM COATED ORAL at 22:06

## 2018-05-03 RX ADMIN — Medication 81 MILLIGRAM(S): at 11:36

## 2018-05-03 RX ADMIN — CHLORHEXIDINE GLUCONATE 1 APPLICATION(S): 213 SOLUTION TOPICAL at 05:33

## 2018-05-03 RX ADMIN — Medication 88 MICROGRAM(S): at 05:32

## 2018-05-03 RX ADMIN — Medication 40 MILLIEQUIVALENT(S): at 05:32

## 2018-05-03 RX ADMIN — CHLORHEXIDINE GLUCONATE 5 MILLILITER(S): 213 SOLUTION TOPICAL at 22:08

## 2018-05-03 RX ADMIN — HEPARIN SODIUM 7 UNIT(S)/HR: 5000 INJECTION INTRAVENOUS; SUBCUTANEOUS at 00:28

## 2018-05-03 RX ADMIN — Medication 40 MILLIEQUIVALENT(S): at 00:28

## 2018-05-03 RX ADMIN — PANTOPRAZOLE SODIUM 40 MILLIGRAM(S): 20 TABLET, DELAYED RELEASE ORAL at 05:37

## 2018-05-03 RX ADMIN — TICAGRELOR 90 MILLIGRAM(S): 90 TABLET ORAL at 05:32

## 2018-05-03 RX ADMIN — ADALIMUMAB 40 MILLIGRAM(S): KIT SUBCUTANEOUS at 09:02

## 2018-05-03 RX ADMIN — HEPARIN SODIUM 5000 UNIT(S): 5000 INJECTION INTRAVENOUS; SUBCUTANEOUS at 22:06

## 2018-05-03 RX ADMIN — MINOCYCLINE HYDROCHLORIDE 100 MILLIGRAM(S): 45 TABLET, EXTENDED RELEASE ORAL at 11:36

## 2018-05-03 NOTE — PROGRESS NOTE ADULT - ASSESSMENT
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 After workup patient found to have severe AS, MARY 0.7cm2, mean AV grad 40mmHg.      - Patient seen and examined this AM.  Discussed with Dr. Singleton.  Patient is scheduled for R and LHC, Possible PCI, Possible TVP, BAV today.  Procedure, risks, expectation, recovery discussed with patient and daughter who are at bedside.  Consent obtained.  - Patient Received Brilinta this AM as instructed by Dr. Singleton.  Pt continues on IV Heparin  - PFTs completed, FEV1 85% predicted  - Carotids, U/A complete, results pending  - Structural Heart CT ordered, to be performed shortly  - Pt ready for procedure today

## 2018-05-03 NOTE — PROGRESS NOTE ADULT - SUBJECTIVE AND OBJECTIVE BOX
CTICU  CRITICAL  CARE  attending     Hand off received 					   Pertinent clinical, laboratory, radiographic, hemodynamic, echocardiographic, respiratory data, microbiologic data and chart were reviewed and analyzed frequently throughout the course of the day and night  Patient seen and examined with CTS/ SH attending at bedside  MEDICAL PROBLEMS      History of Present Illness:   89 y/o F w/PMH of CAD s/p MAIA x3 in  c/b GI bleed while on Plavix, AS, palpitations s/p loop recorder, Crohn's c/b fistula and MRSA infection s/p partial bowel resection, HTN, and hypothyroidism presented to [facility] ED c/o acute onset chest pain. Hx supplemented by son, who was at bedside. Pt was flying back to [place] from [place], when she reported mild CP that started at base of lungs and progressed to center of chest. CP radiated to back and was associated w/nausea that lasted about 30 minutes. Pt was put on 2L NC and PIV placed in flight. Pt noted that her sx resolved w/oxygen, IVF, and aspirin 250mg. Pt denied SOB, productive cough, palpitations, recent illness, hx blood clots, f/c/v/d, abdominal pain, urinary symptoms, change in medications, HA, dizziness, changes in vision.    In  ED, T 98.4, HR 68, /84, RR 18, O2 96% on RA. Labs s/f D-dimer 461, trop I 0.293. EKG s/f NSR. CTA chest neg for PE. Pt given ASA 81mg and Lovenox 60mg.    Upon arrival to CCU, VS s/f /88. Pt given Lopressor 25mg PO with improvement of SBP to 150s. Anticoagulation and indication for PCI was discussed with pt and family, who was concerned about hx of GI bleed on Plavix; however, pt and family would like to proceed with alternative A/C medication despite risks of bleeding. Pt was Brilinta loaded with plan for hep gtt and cardiac cath in AM.      Crohn's disease with complication, unspecified gastrointestinal tract location: Crohn&#x27;s disease with complication, unspecified gastrointestinal tract location  Hypothyroidism, unspecified type: Hypothyroidism, unspecified type  Coronary artery disease involving native coronary artery of native heart, angina presence unspecified: Coronary artery disease involving native coronary artery of native heart, angina presence unspecified  Nonrheumatic aortic valve stenosis: Nonrheumatic aortic valve stenosis  Nutrition, metabolism, and development symptoms: Nutrition, metabolism, and development symptoms  Constipation: Constipation  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: 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  Aortic stenosis: Aortic stenosis  Anxiety: Anxiety  Hypothyroidism: Hypothyroidism  Crohns disease: Crohns disease  CAD (coronary artery disease): CAD (coronary artery disease)  NSTEMI (non-ST elevated myocardial infarction): NSTEMI (non-ST elevated myocardial infarction)      , FAMILY HISTORY:  No pertinent family history in first degree relatives  PAST MEDICAL & SURGICAL HISTORY:  Breast cancer  Constipation  Anxiety  Hypothyroidism  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  ICU Vital Signs Last 24 Hrs  T(C): 37.2 (03 May 2018 22:05), Max: 37.2 (03 May 2018 22:05)  T(F): 98.9 (03 May 2018 22:05), Max: 98.9 (03 May 2018 22:05)  HR: 64 (03 May 2018 23:15) (52 - 72)  BP: 104/64 (03 May 2018 14:00) (86/51 - 182/72)  BP(mean): 86 (03 May 2018 14:00) (64 - 112)  ABP: 122/50 (03 May 2018 23:15) (108/48 - 188/70)  ABP(mean): 74 (03 May 2018 23:15) (68 - 114)  RR: 16 (03 May 2018 23:15) (12 - 36)  SpO2: 100% (03 May 2018 23:15) (94% - 100%)    Adult Advanced Hemodynamics Last 24 Hrs  CVP(mm Hg): --  CVP(cm H2O): --  CO: --  CI: --  PA: --  PA(mean): --  PCWP: --  SVR: --  SVRI: --  PVR: --  PVRI: --, ABG - ( 03 May 2018 19:46 )  pH, Arterial: 7.43  pH, Blood: x     /  pCO2: 36    /  pO2: 188   / HCO3: 23    / Base Excess: -0.6  /  SaO2: 99                  Intake and output was reviewed and the fluid balance was calculated  Daily Height in cm: 152.4 (03 May 2018 11:03)    Daily Weight in k.5 (03 May 2018 06:06)  I&O's Summary    02 May 2018 07:01  -  03 May 2018 07:00  --------------------------------------------------------  IN: 222 mL / OUT: 500 mL / NET: -278 mL    03 May 2018 07:01  -  04 May 2018 00:12  --------------------------------------------------------  IN: 49 mL / OUT: 450 mL / NET: -401 mL        All lines and drain sites were assessed  Glycemic trend was reviewedCAPILLARY BLOOD GLUCOSE      POCT Blood Glucose.: 99 mg/dL (02 May 2018 22:46)    No acute change in mental status  Auscultation of the chest reveals equal bs  Abdomen is soft  Extremities are warm and well perfused  Wounds appear clean and unremarkable  Antibiotics are periop    labs  CBC Full  -  ( 03 May 2018 19:42 )  WBC Count : 7.6 K/uL  Hemoglobin : 11.1 g/dL  Hematocrit : 34.5 %  Platelet Count - Automated : 115 K/uL  Mean Cell Volume : 90.1 fL  Mean Cell Hemoglobin : 29.0 pg  Mean Cell Hemoglobin Concentration : 32.2 g/dL  Auto Neutrophil # : x  Auto Lymphocyte # : x  Auto Monocyte # : x  Auto Eosinophil # : x  Auto Basophil # : x  Auto Neutrophil % : x  Auto Lymphocyte % : x  Auto Monocyte % : x  Auto Eosinophil % : x  Auto Basophil % : x    05-    138  |  103  |  13  ----------------------------<  92  4.5   |  22  |  0.95    Ca    8.6      03 May 2018 19:44  Phos  3.5     05-02  Mg     2.1     05-03    TPro  6.2  /  Alb  3.5  /  TBili  0.9  /  DBili  x   /  AST  23  /  ALT  14  /  AlkPhos  45  -    PT/INR - ( 03 May 2018 19:44 )   PT: 12.1 sec;   INR: 1.09          PTT - ( 03 May 2018 19:44 )  PTT:34.7 sec  The current medications were reviewed   MEDICATIONS  (STANDING):  adalimumab Injectable 40 milliGRAM(s) SubCutaneous every 2 weeks  aspirin  chewable 81 milliGRAM(s) Oral daily  atorvastatin 80 milliGRAM(s) Oral at bedtime  ceFAZolin   IVPB 2000 milliGRAM(s) IV Intermittent every 8 hours  chlorhexidine 0.12% Liquid 5 milliLiter(s) Swish and Spit every 4 hours  chlorhexidine 2% Cloths 1 Application(s) Topical daily  heparin  Injectable 5000 Unit(s) SubCutaneous every 8 hours  levothyroxine 88 MICROGram(s) Oral daily  meperidine     Injectable 25 milliGRAM(s) IV Push once  minocycline 100 milliGRAM(s) Oral daily  multivitamin 1 Tablet(s) Oral daily  pantoprazole    Tablet 40 milliGRAM(s) Oral before breakfast  polyethylene glycol 3350 17 Gram(s) Oral daily  sodium chloride 0.9% lock flush 20 milliLiter(s) IV Push once  sodium chloride 0.9%. 1000 milliLiter(s) (10 mL/Hr) IV Continuous <Continuous>  ticagrelor 90 milliGRAM(s) Oral two times a day    MEDICATIONS  (PRN):  sodium chloride 0.9% lock flush 10 milliLiter(s) IV Push every 1 hour PRN After each medication administration  sodium chloride 0.9% lock flush 10 milliLiter(s) IV Push every 12 hours PRN Lumen of catheter NOT used       PROBLEM LIST/ ASSESSMENT:  HEALTH ISSUES - PROBLEM Dx:  Crohn's disease with complication, unspecified gastrointestinal tract location: Crohn&#x27;s disease with complication, unspecified gastrointestinal tract location  Hypothyroidism, unspecified type: Hypothyroidism, unspecified type  Coronary artery disease involving native coronary artery of native heart, angina presence unspecified: Coronary artery disease involving native coronary artery of native heart, angina presence unspecified  Nonrheumatic aortic valve stenosis: Nonrheumatic aortic valve stenosis  Nutrition, metabolism, and development symptoms: Nutrition, metabolism, and development symptoms  Constipation: Constipation  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: 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  Aortic stenosis: Aortic stenosis  Anxiety: Anxiety  Hypothyroidism: Hypothyroidism  Crohns disease: Crohns disease  CAD (coronary artery disease): CAD (coronary artery disease)  NSTEMI (non-ST elevated myocardial infarction): NSTEMI (non-ST elevated myocardial infarction)      ,   Patient is a 89y old  Female who presents with a chief complaint of NSTEMI (02 May 2018 03:12)     s/p   acute changes include acute respiratory failure    My plan includes :  close hemodynamic, ventilatory and drain monitoring and management per post op routine    Monitor for arrhythmias and monitor parameters for organ perfusion  monitor neurologic status  Head of the bed should remain elevated to 45 deg .   chest PT and IS will be encouraged  monitor adequacy of oxygenation and ventilation and attempt to wean oxygen  Nutritional goals will be met using po eventually , ensure adequate caloric intake and montior the same  Stress ulcer and VTE prophylaxis will be achieved    Glycemic control is satisfactory  Electrolytes have been repleted as necessary and wound care has been carried out. Pain control has been achieved.   agressive physical therapy and early mobility and ambulation goals will be met   The family was updated about the course and plan  CRITICAL CARE TIME SPENT in evaluation and management, reassessments, review and interpretation of labs and x-rays, ventilator and hemodynamic management, formulating a plan and coordinating care: ___60____ MIN.  Time does not include procedural time.  CTICU ATTENDING     					    Barron Denis MD CTICU  CRITICAL  CARE  attending     Hand off received 					   Pertinent clinical, laboratory, radiographic, hemodynamic, echocardiographic, respiratory data, microbiologic data and chart were reviewed and analyzed frequently throughout the course of the day and night  Patient seen and examined with CTS/ SH attending at bedside  MEDICAL PROBLEMS      History of Present Illness:   89 y/o F w/PMH of CAD s/p MAIA x3 in  c/b GI bleed while on Plavix, AS, palpitations s/p loop recorder, Crohn's c/b fistula and MRSA infection s/p partial bowel resection, HTN, and hypothyroidism presented to [facility] ED c/o acute onset chest pain. Hx supplemented by son, who was at bedside. Pt was flying back to [place] from [place], when she reported mild CP that started at base of lungs and progressed to center of chest. CP radiated to back and was associated w/nausea that lasted about 30 minutes. Pt was put on 2L NC and PIV placed in flight. Pt noted that her sx resolved w/oxygen, IVF, and aspirin 250mg. Pt denied SOB, productive cough, palpitations, recent illness, hx blood clots, f/c/v/d, abdominal pain, urinary symptoms, change in medications, HA, dizziness, changes in vision.    In  ED, T 98.4, HR 68, /84, RR 18, O2 96% on RA. Labs s/f D-dimer 461, trop I 0.293. EKG s/f NSR. CTA chest neg for PE. Pt given ASA 81mg and Lovenox 60mg.    Upon arrival to CCU, VS s/f /88. Pt given Lopressor 25mg PO with improvement of SBP to 150s. Anticoagulation and indication for PCI was discussed with pt and family, who was concerned about hx of GI bleed on Plavix; however, pt and family would like to proceed with alternative A/C medication despite risks of bleeding. Pt was Brilinta loaded with plan for hep gtt and cardiac cath in AM.      Crohn's disease with complication, unspecified gastrointestinal tract location: Crohn&#x27;s disease with complication, unspecified gastrointestinal tract location  Hypothyroidism, unspecified type: Hypothyroidism, unspecified type  Coronary artery disease involving native coronary artery of native heart, angina presence unspecified: Coronary artery disease involving native coronary artery of native heart, angina presence unspecified  Nonrheumatic aortic valve stenosis: Nonrheumatic aortic valve stenosis  Nutrition, metabolism, and development symptoms: Nutrition, metabolism, and development symptoms  Constipation: Constipation  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: 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  Aortic stenosis: Aortic stenosis  Anxiety: Anxiety  Hypothyroidism: Hypothyroidism  Crohns disease: Crohns disease  CAD (coronary artery disease): CAD (coronary artery disease)  NSTEMI (non-ST elevated myocardial infarction): NSTEMI (non-ST elevated myocardial infarction)      , FAMILY HISTORY:  No pertinent family history in first degree relatives  PAST MEDICAL & SURGICAL HISTORY:  Breast cancer  Constipation  Anxiety  Hypothyroidism  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  ICU Vital Signs Last 24 Hrs  T(C): 37.2 (03 May 2018 22:05), Max: 37.2 (03 May 2018 22:05)  T(F): 98.9 (03 May 2018 22:05), Max: 98.9 (03 May 2018 22:05)  HR: 64 (03 May 2018 23:15) (52 - 72)  BP: 104/64 (03 May 2018 14:00) (86/51 - 182/72)  BP(mean): 86 (03 May 2018 14:00) (64 - 112)  ABP: 122/50 (03 May 2018 23:15) (108/48 - 188/70)  ABP(mean): 74 (03 May 2018 23:15) (68 - 114)  RR: 16 (03 May 2018 23:15) (12 - 36)  SpO2: 100% (03 May 2018 23:15) (94% - 100%)    Adult Advanced Hemodynamics Last 24 Hrs  CVP(mm Hg): --  CVP(cm H2O): --  CO: --  CI: --  PA: --  PA(mean): --  PCWP: --  SVR: --  SVRI: --  PVR: --  PVRI: --, ABG - ( 03 May 2018 19:46 )  pH, Arterial: 7.43  pH, Blood: x     /  pCO2: 36    /  pO2: 188   / HCO3: 23    / Base Excess: -0.6  /  SaO2: 99                  Intake and output was reviewed and the fluid balance was calculated  Daily Height in cm: 152.4 (03 May 2018 11:03)    Daily Weight in k.5 (03 May 2018 06:06)  I&O's Summary    02 May 2018 07:01  -  03 May 2018 07:00  --------------------------------------------------------  IN: 222 mL / OUT: 500 mL / NET: -278 mL    03 May 2018 07:01  -  04 May 2018 00:12  --------------------------------------------------------  IN: 49 mL / OUT: 450 mL / NET: -401 mL        All lines and drain sites were assessed  Glycemic trend was [facility]      POCT Blood Glucose.: 99 mg/dL (02 May 2018 22:46)    NEURO: No acute change in mental status.  CVS: S1, S2, No S3.  RESPIRATORY: Auscultation of the chest reveals equal breath sounds.  GI; Abdomen is soft. No tenderness. + Bowel sounds.  Extremities are warm and well perfused  Wounds appear clean and unremarkable  Antibiotics are cefazolin and minocycline.        labs  CBC Full  -  ( 03 May 2018 19:42 )  WBC Count : 7.6 K/uL  Hemoglobin : 11.1 g/dL  Hematocrit : 34.5 %  Platelet Count - Automated : 115 K/uL  Mean Cell Volume : 90.1 fL  Mean Cell Hemoglobin : 29.0 pg  Mean Cell Hemoglobin Concentration : 32.2 g/dL  Auto Neutrophil # : x  Auto Lymphocyte # : x  Auto Monocyte # : x  Auto Eosinophil # : x  Auto Basophil # : x  Auto Neutrophil % : x  Auto Lymphocyte % : x  Auto Monocyte % : x  Auto Eosinophil % : x  Auto Basophil % : x    05-03    138  |  103  |  13  ----------------------------<  92  4.5   |  22  |  0.95    Ca    8.6      03 May 2018 19:44  Phos  3.5     05-02  Mg     2.1     05-03    TPro  6.2  /  Alb  3.5  /  TBili  0.9  /  DBili  x   /  AST  23  /  ALT  14  /  AlkPhos  45  05-02    PT/INR - ( 03 May 2018 19:44 )   PT: 12.1 sec;   INR: 1.09          PTT - ( 03 May 2018 19:44 )  PTT:34.7 sec  The current medications were reviewed   MEDICATIONS  (STANDING):  adalimumab Injectable 40 milliGRAM(s) SubCutaneous every 2 weeks  aspirin  chewable 81 milliGRAM(s) Oral daily  atorvastatin 80 milliGRAM(s) Oral at bedtime  ceFAZolin   IVPB 2000 milliGRAM(s) IV Intermittent every 8 hours  chlorhexidine 0.12% Liquid 5 milliLiter(s) Swish and Spit every 4 hours  chlorhexidine 2% Cloths 1 Application(s) Topical daily  heparin  Injectable 5000 Unit(s) SubCutaneous every 8 hours  levothyroxine 88 MICROGram(s) Oral daily  meperidine     Injectable 25 milliGRAM(s) IV Push once  minocycline 100 milliGRAM(s) Oral daily  multivitamin 1 Tablet(s) Oral daily  pantoprazole    Tablet 40 milliGRAM(s) Oral before breakfast  polyethylene glycol 3350 17 Gram(s) Oral daily  sodium chloride 0.9% lock flush 20 milliLiter(s) IV Push once  sodium chloride 0.9%. 1000 milliLiter(s) (10 mL/Hr) IV Continuous <Continuous>  ticagrelor 90 milliGRAM(s) Oral two times a day    MEDICATIONS  (PRN):  sodium chloride 0.9% lock flush 10 milliLiter(s) IV Push every 1 hour PRN After each medication administration  sodium chloride 0.9% lock flush 10 milliLiter(s) IV Push every 12 hours PRN Lumen of catheter NOT used       PROBLEM LIST/ ASSESSMENT:  HEALTH ISSUES - PROBLEM Dx:  Crohn's disease with complication, unspecified gastrointestinal tract location: Crohn&#x27;s disease with complication, unspecified gastrointestinal tract location  Hypothyroidism, unspecified type: Hypothyroidism, unspecified type  Coronary artery disease involving native coronary artery of native heart, angina presence unspecified: Coronary artery disease involving native coronary artery of native heart, angina presence unspecified  Nonrheumatic aortic valve stenosis: Nonrheumatic aortic valve stenosis  Nutrition, metabolism, and development symptoms: Nutrition, metabolism, and development symptoms  Constipation: Constipation  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: 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  Aortic stenosis: Aortic stenosis  Anxiety: Anxiety  Hypothyroidism: Hypothyroidism  Crohns disease: Crohns disease  CAD (coronary artery disease): CAD (coronary artery disease)  NSTEMI (non-ST elevated myocardial infarction): NSTEMI (non-ST elevated myocardial infarction)        Patient is a 89y old  Female who presents with severe  aortic stenosis.  S/P Balloon aortic valvuloplasty.  Hemodynamically stable.  Good urine output.  Fair oxygenation.        My plan includes :  Close hemodynamic, ventilatory and drain monitoring and management.  Resume Thyroid replacement Rx.  Resume Adalimumab.  Monitor for arrhythmias and monitor parameters for organ perfusion  Monitor neurologic status  Head of the bed should remain elevated to 45 deg .   Chest PT and IS will be encouraged  Monitor adequacy of oxygenation and ventilation and attempt to wean oxygen  Nutritional goals will be met using po eventually , ensure adequate caloric intake and monitor  the same  Stress ulcer and VTE prophylaxis will be achieved    Glycemic control is satisfactory  Electrolytes have been repleted as necessary and wound care has been carried out. Pain control has been achieved.   Aggressive  physical therapy and early mobility and ambulation goals will be met   The family was updated about the course and plan  CRITICAL CARE TIME SPENT in evaluation and management, reassessments, review and interpretation of labs and x-rays, ventilator and hemodynamic management, formulating a plan and coordinating care: ___60____ MIN.  Time does not include procedural time.  CTICU ATTENDING     					    Barron Denis MD

## 2018-05-03 NOTE — CONSULT NOTE ADULT - SUBJECTIVE AND OBJECTIVE BOX
Vascular Access Service Consult Note    89yFemaleHEALTH ISSUES - PROBLEM Dx:  Nutrition, metabolism, and development symptoms: Nutrition, metabolism, and development symptoms  Constipation: Constipation  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: 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  Aortic stenosis: Aortic stenosis  Anxiety: Anxiety  Hypothyroidism: Hypothyroidism  Crohns disease: Crohns disease  CAD (coronary artery disease): CAD (coronary artery disease)  NSTEMI (non-ST elevated myocardial infarction): NSTEMI (non-ST elevated myocardial infarction)             Diagnosis: Nstemi    Indications for Vascular Access (Check all that apply)  [  ]  Antibiotic Therapy       Antibiotic Prescribed:                                                                             Expected Duration of Therapy:               [  ]  IV Hydration  [  ]  Total Parenteral Nutrition  [  ]  Chemotherapy  [ X ]  Difficult Venous Access  [  ]  CVP monitoring  [  ]  Medications with high potential for tissue necrosis on extravasation  [  ]  Other    Screening (Check all that apply)  Previous Radiation to chest  [  ] Yes      [  X]  No  Breast Cancer                          [  ] Left     [  ]  Right    [ X ]  No  Lymph Node Dissection         [  ] Left     [  ]  Right    [ X ]  No  Pacemaker or ICD                   [  ] Left     [  ]  Right    [X  ]  No  Upper Extremity DVT             [  ] Left     [  ]  Right    [ X ]  No  Chronic Kidney Disease         [  ]  Yes     [ X ]  No  Hemodialysis                           [  ]  Yes     [ X ]  No  AV Fistula/ Graft                     [  ]  Left    [  ]  Right    [X  ]  No  Temp>101F in past 24 H       [  ]  Yes     [ X ]  No  H/O PICC/Midline                   [  ]  Yes     [  X]  No    Lab data:                        12.7   6.0   )-----------( 95       ( 03 May 2018 06:16 )             38.2     05-03    138  |  102  |  14  ----------------------------<  94  4.3   |  26  |  0.96    Ca    8.6      03 May 2018 06:34  Phos  3.5     05-02  Mg     2.1     05-03    TPro  6.2  /  Alb  3.5  /  TBili  0.9  /  DBili  x   /  AST  23  /  ALT  14  /  AlkPhos  45  05-02    PT/INR - ( 03 May 2018 06:34 )   PT: 12.1 sec;   INR: 1.09          PTT - ( 03 May 2018 06:34 )  PTT:76.9 sec          I have reviewed the chart, interviewed and examined the patient and determined that this patient:  [  ] Is a candidate for a PICC line  [X ] Is a candidate for a Midline  [  ] Is not a candidate for vascular access device (reason)    Lumens:    [X  ] Single  [  ] Double

## 2018-05-03 NOTE — PROGRESS NOTE ADULT - PROBLEM SELECTOR PLAN 8
F:  none  E:  replete PRN  N:  NPO    Lines:  PICC/midline today  Tubes: none  GI ppx: none  Code status: full code  Dispo: CCU

## 2018-05-03 NOTE — PROGRESS NOTE ADULT - SUBJECTIVE AND OBJECTIVE BOX
INTERVAL/OVERNIGHT EVENTS:        SUBJECTIVE:  Seen and examined at bedside.    ROS otherwise negative.    VITAL SIGNS:  T(F): 96.3 (05-03-18 @ 01:33)  HR: 64 (05-03-18 @ 04:00)  BP: 114/60 (05-03-18 @ 04:00)  RR: 22 (05-03-18 @ 04:00)  SpO2: 98% (05-03-18 @ 04:00)  Wt(kg): --    PHYSICAL EXAM:        MEDICATIONS  (STANDING):  aspirin  chewable 81 milliGRAM(s) Oral daily  atorvastatin 80 milliGRAM(s) Oral at bedtime  chlorhexidine 0.12% Liquid 5 milliLiter(s) Swish and Spit once  chlorhexidine 2% Cloths 1 Application(s) Topical daily  heparin  Infusion 700 Unit(s)/Hr (7 mL/Hr) IV Continuous <Continuous>  levothyroxine 88 MICROGram(s) Oral daily  metoprolol tartrate 12.5 milliGRAM(s) Oral every 12 hours  minocycline 100 milliGRAM(s) Oral daily  multivitamin 1 Tablet(s) Oral daily  pantoprazole    Tablet 40 milliGRAM(s) Oral before breakfast  polyethylene glycol 3350 17 Gram(s) Oral daily  ticagrelor 90 milliGRAM(s) Oral two times a day    MEDICATIONS  (PRN):      Allergies    No Known Allergies    Intolerances      aspirin  chewable 81 milliGRAM(s) Oral daily  atorvastatin 80 milliGRAM(s) Oral at bedtime  chlorhexidine 0.12% Liquid 5 milliLiter(s) Swish and Spit once  chlorhexidine 2% Cloths 1 Application(s) Topical daily  heparin  Infusion 700 Unit(s)/Hr IV Continuous <Continuous>  levothyroxine 88 MICROGram(s) Oral daily  metoprolol tartrate 12.5 milliGRAM(s) Oral every 12 hours  minocycline 100 milliGRAM(s) Oral daily  multivitamin 1 Tablet(s) Oral daily  pantoprazole    Tablet 40 milliGRAM(s) Oral before breakfast  polyethylene glycol 3350 17 Gram(s) Oral daily  ticagrelor 90 milliGRAM(s) Oral two times a day      LABS:                          11.7   6.7   )-----------( 124      ( 02 May 2018 23:30 )             36.2     05-02    134<L>  |  96  |  15  ----------------------------<  241<H>  3.5   |  23  |  0.89    Ca    8.4      02 May 2018 23:30  Phos  3.5     05-02  Mg     2.0     05-02    TPro  6.2  /  Alb  3.5  /  TBili  0.9  /  DBili  x   /  AST  23  /  ALT  14  /  AlkPhos  45  05-02    PT/INR - ( 02 May 2018 01:18 )   PT: 11.1 sec;   INR: 1.00          PTT - ( 02 May 2018 22:51 )  PTT:94.6 sec      RADIOLOGY & ADDITIONAL TESTS:  All imaging reviewed. INTERVAL/OVERNIGHT EVENTS:  Episode of presyncope during transfer from chair to bed last night at 10 pm after receiving Xanax and melatonin.  Xanax was 4-6 hours after prior dose.  CBC, BMP unremarkable.  K+ repleted.  Cardiac enzymes negative.  Melatonin and xanax d/c'ed.  No further episodes.  Heparin gtt reduced for PTT 94 at 10 pm.  Carotid dopplers performed.  PFTs performed.    SUBJECTIVE:  Seen and examined at bedside.  Reports no complaints aside from episode of presyncope at 10 pm.  No further lightheadedness/dizzyness.  Denies CP, palpitations, SOB, cough, F/C, abd pain, N/V/D/C, back pain, edema.  Mild chronic L knee pain at baseline.    ROS otherwise negative.    VITAL SIGNS:  T(F): 96.3 (05-03-18 @ 01:33)  HR: 64 (05-03-18 @ 04:00)  BP: 114/60 (05-03-18 @ 04:00)  RR: 22 (05-03-18 @ 04:00)  SpO2: 98% (05-03-18 @ 04:00)  Wt(kg): --    PHYSICAL EXAM:    General:  NAD, nontoxic appearing, WDWN  HENT:  EOMI, PERRL.  No sinus tenderness.  oropharynx WNL.    Neck:  Trachea midline.  No JVD, LAD, or thyromegaly.  Heart:  S1S2 w/ 3/6 TAMIKO heard best at apex/RUSB radiating toward carotids  Lungs:  CTAB no wheezing, rhonchi or rales.  No accessory muscle use.  No respiratory distress.  Abdomen:  NABS.  soft, nontender, nondistended.  no guarding.  no ascites.  no organomegaly.  Multiple healed scars from enterocutaneous fistulas.    Vascular:  Peripheral pulses palpable  Extremities:  trace LE edema bilaterally.  Back:  No CVA tenderness  Neuro:  AOx3, no facial asymmetry, nonfocal, no slurred speech  Skin:  No rash    MEDICATIONS  (STANDING):  aspirin  chewable 81 milliGRAM(s) Oral daily  atorvastatin 80 milliGRAM(s) Oral at bedtime  chlorhexidine 0.12% Liquid 5 milliLiter(s) Swish and Spit once  chlorhexidine 2% Cloths 1 Application(s) Topical daily  heparin  Infusion 700 Unit(s)/Hr (7 mL/Hr) IV Continuous <Continuous>  levothyroxine 88 MICROGram(s) Oral daily  metoprolol tartrate 12.5 milliGRAM(s) Oral every 12 hours  minocycline 100 milliGRAM(s) Oral daily  multivitamin 1 Tablet(s) Oral daily  pantoprazole    Tablet 40 milliGRAM(s) Oral before breakfast  polyethylene glycol 3350 17 Gram(s) Oral daily  ticagrelor 90 milliGRAM(s) Oral two times a day    MEDICATIONS  (PRN):      Allergies    No Known Allergies    Intolerances      aspirin  chewable 81 milliGRAM(s) Oral daily  atorvastatin 80 milliGRAM(s) Oral at bedtime  chlorhexidine 0.12% Liquid 5 milliLiter(s) Swish and Spit once  chlorhexidine 2% Cloths 1 Application(s) Topical daily  heparin  Infusion 700 Unit(s)/Hr IV Continuous <Continuous>  levothyroxine 88 MICROGram(s) Oral daily  metoprolol tartrate 12.5 milliGRAM(s) Oral every 12 hours  minocycline 100 milliGRAM(s) Oral daily  multivitamin 1 Tablet(s) Oral daily  pantoprazole    Tablet 40 milliGRAM(s) Oral before breakfast  polyethylene glycol 3350 17 Gram(s) Oral daily  ticagrelor 90 milliGRAM(s) Oral two times a day      LABS:                          11.7   6.7   )-----------( 124      ( 02 May 2018 23:30 )             36.2     05-02    134<L>  |  96  |  15  ----------------------------<  241<H>  3.5   |  23  |  0.89    Ca    8.4      02 May 2018 23:30  Phos  3.5     05-02  Mg     2.0     05-02    TPro  6.2  /  Alb  3.5  /  TBili  0.9  /  DBili  x   /  AST  23  /  ALT  14  /  AlkPhos  45  05-02    PT/INR - ( 02 May 2018 01:18 )   PT: 11.1 sec;   INR: 1.00          PTT - ( 02 May 2018 22:51 )  PTT:94.6 sec      RADIOLOGY & ADDITIONAL TESTS:  All imaging reviewed.

## 2018-05-03 NOTE — PROCEDURE NOTE - NSPROCDETAILS_GEN_ALL_CORE
sterile technique, catheter placed/ultrasound guidance/location identified, draped/prepped, sterile technique used/sterile dressing applied/ultrasound assessment

## 2018-05-03 NOTE — BRIEF OPERATIVE NOTE - OPERATION/FINDINGS
12F rt CFA, 6F rt CFV sheaths  LHC: rt dominant system, RCA 40% ostial stenosis, 30% proximal ISR/20% mid ISR, RPDA ostial-proximal 40% diffuse disease, LMCA 20% osital stenosis, 20% ostial calcific disease, LAD 40% mid diffuse disease (small vessel), DIAG1 patent proximal stent  BAV performed using a [id number] TrueFlow balloon  LV/Ao peak-peak gradient pre 34mmHg/post 21mmHg  hemostasis: rt CFA proglide x2, rt CFV sutured in place  new bundle branch block  imp: severe AS s/p BAV; acute/chronic diastolic CHF; CAD nonosbtructive  plan:  -routine post-catheterization care  -continue asa 81mg daily tomorrow; d/c brilinta/heparin  -TTE tomorrow am  -maintain TVP; monitor EKG

## 2018-05-03 NOTE — BRIEF OPERATIVE NOTE - PROCEDURE
<<-----Click on this checkbox to enter Procedure Balloon valvuloplasty of aortic valve  05/03/2018    Active  ALEELIGER

## 2018-05-03 NOTE — PROGRESS NOTE ADULT - ASSESSMENT
89 y/o F w/PMH of CAD s/p MAIA x3 in 2005 and 2012 (no longer on ASA/plavix 2/2 GIB ~2014), AS, syncope s/p loop recorder (no arrythmias/events after 3 years), Crohn's c/b recurrent enterocutaneous fistulas with abd wall mesh c/b MRSA infection s/p partial MESH/colon resection and reanastomosis on daily midodrine MRSA suppression, HTN, and hypothyroidism who presented with CP and presyncope admitted with NSTEMI with severe AS pending LHC and possible balloon valvuloplasty. 89 y/o F w/PMH of CAD s/p MAIA x3 in 2005 and 2012 (no longer on ASA/plavix 2/2 GIB ~2014), AS, syncope s/p loop recorder (no arrythmias/events after 3 years), Crohn's c/b recurrent enterocutaneous fistulas with abd wall mesh c/b MRSA infection s/p partial MESH/colon resection and reanastomosis on daily midodrine MRSA suppression, HTN, and hypothyroidism who presented with CP and presyncope admitted with NSTEMI with severe AS pending LHC and possible balloon valvuloplasty, on heparin gtt.

## 2018-05-03 NOTE — PROGRESS NOTE ADULT - PROBLEM SELECTOR PLAN 7
c/w Xanax 0.125 mg QHS PRN    #presyncope  likely 2/2 polypharmacy.  d/c'ed melatonin.  Xanax only QHS now, PRN

## 2018-05-03 NOTE — PROGRESS NOTE ADULT - SUBJECTIVE AND OBJECTIVE BOX
Patient discussed on morning rounds with Dr. Singleton    SUBJECTIVE ASSESSMENT:    89 y/o female resting in bed in NAD.  No overnight events.  Pt denies chest pain, SOB, palpitations, orthopnea, nausea, abdominal discomfort.  No fevers/chills, cough, sputum production.  Pt anticipating procedure later today.    Vital Signs Last 24 Hrs  T(C): 36.6 (03 May 2018 12:00), Max: 36.8 (02 May 2018 17:04)  T(F): 97.9 (03 May 2018 12:00), Max: 98.2 (02 May 2018 17:04)  HR: 64 (03 May 2018 13:00) (52 - 74)  BP: 182/72 (03 May 2018 13:00) (86/51 - 182/81)  BP(mean): 98 (03 May 2018 13:00) (64 - 129)  RR: 24 (03 May 2018 13:00) (10 - 36)  SpO2: 100% (03 May 2018 13:00) (94% - 100%)  I&O's Detail    02 May 2018 07:01  -  03 May 2018 07:00  --------------------------------------------------------  IN:    heparin Infusion: 55 mL    heparin Infusion: 32 mL    heparin Infusion: 35 mL    Solution: 100 mL  Total IN: 222 mL    OUT:    Voided: 500 mL  Total OUT: 500 mL  Total NET: -278 mL      03 May 2018 07:01  -  03 May 2018 14:06  --------------------------------------------------------  IN:    heparin Infusion: 42 mL  Total IN: 42 mL    OUT:    Voided: 150 mL  Total OUT: 150 mL    Total NET: -108 mL    CHEST TUBE:  No  CANDY DRAIN:  No.  JENNINGS: No.    PHYSICAL EXAM:    General: 89 y/o female resting in bed in [facility]    Neurological: A&O x 3, no focal defects    Cardiovascular: RRR, +TAMIKO, R sternal border, Radiating to carotids    Respiratory: CTA bilaterally    Gastrointestinal: soft, non-tender, non-distended     Extremities: No extremity edema     Vascular: Extremities warm, well perfused     Incision Sites: none    LABS:                        12.7   6.0   )-----------( 95       ( 03 May 2018 06:16 )             38.2   PT/INR - ( 03 May 2018 06:34 )   PT: 12.1 sec;   INR: 1.09        PTT - ( 03 May 2018 06:34 )  PTT:76.9 sec    05-03    138  |  102  |  14  ----------------------------<  94  4.3   |  26  |  0.96    Ca    8.6      03 May 2018 06:34  Phos  3.5     05-02  Mg     2.1     05-03    TPro  6.2  /  Alb  3.5  /  TBili  0.9  /  DBili  x   /  AST  23  /  ALT  14  /  AlkPhos  45  05-02      Urinalysis Basic - ( 03 May 2018 09:55 )    Color: Yellow / Appearance: Clear / SG: <=1.005 / pH: x  Gluc: x / Ketone: NEGATIVE  / Bili: Negative / Urobili: 0.2 E.U./dL   Blood: x / Protein: NEGATIVE mg/dL / Nitrite: POSITIVE   Leuk Esterase: Small / RBC: < 5 /HPF / WBC Many /HPF   Sq Epi: x / Non Sq Epi: 0-5 /HPF / Bacteria: Present /HPF    MEDICATIONS  (STANDING):  adalimumab Injectable 40 milliGRAM(s) SubCutaneous every 2 weeks  aspirin  chewable 81 milliGRAM(s) Oral daily  atorvastatin 80 milliGRAM(s) Oral at bedtime  chlorhexidine 2% Cloths 1 Application(s) Topical daily  heparin  Infusion 700 Unit(s)/Hr (7 mL/Hr) IV Continuous <Continuous>  levothyroxine 88 MICROGram(s) Oral daily  metoprolol tartrate 12.5 milliGRAM(s) Oral every 12 hours  minocycline 100 milliGRAM(s) Oral daily  multivitamin 1 Tablet(s) Oral daily  pantoprazole    Tablet 40 milliGRAM(s) Oral before breakfast  polyethylene glycol 3350 17 Gram(s) Oral daily  sodium chloride 0.9% lock flush 20 milliLiter(s) IV Push once  ticagrelor 90 milliGRAM(s) Oral two times a day    MEDICATIONS  (PRN):  sodium chloride 0.9% lock flush 10 milliLiter(s) IV Push every 1 hour PRN After each medication administration  sodium chloride 0.9% lock flush 10 milliLiter(s) IV Push every 12 hours PRN Lumen of catheter NOT used        RADIOLOGY & ADDITIONAL TESTS:

## 2018-05-03 NOTE — PROGRESS NOTE ADULT - ATTENDING COMMENTS
89F with CAD s/p MAIA x3 in 2012 c/b GI bleed while on Plavix, Severe Aortic Stenosis, Syncope s/p loop recorder (pt unsure of when/where placed and by whom), Crohn's Disease c/b fistula s/p partial bowel resection c/b MRSA infection - now on chronic suppressive Minocycline therapy, Essential HTN, and Hypothyroidism presented to [facility] ED after transatlantic flight from [facility] c/o acute onset SSCP, diagnosed with NSTEMI trop I 0.293. CTA negative for PE. Patient transferred to [facility] CCU for further evaluation of severe symptomatic AS and NSTEMI.    Vitals, labs, EKG and imaging reviewed 5/3  Exam notable for elderly female sitting up in bed, no supplemental O2 in place, JVP 9, RRR, II/VI systolic murmur at LSB, scattered basilar crackles, abd NTTP, extensive bruising along b/l UE at venipuncture sites, trace b/l PINA, extremities WWP, A&Ox3    -IR consultation for midline/power picc placement for CT   -->pt with very poor venous access and multiple failed attempts and PIV placement  -Awaiting Carotid U/A , PFTs WNL  -NPO for CTA structural 5/3  -NPO for R/LHC 5/3 with possible PCI, BAV in Hybrid OR with Tim Araiza and Areli  -Heparin gtt x 48hr per ACS protocol  -Cont Metoprolol 12.5 BID  -Cont DAPT with ASA/Brilinta (prior bleeding on Plavix)  -->No e/o bleeding since admission  -Atorva 80 qHS  -Hold vasodilators and ACE/ARB given severe AS  -Cont chronic MRSA tx with Minocycline 100mg daily  -Humira given 5/3, next due 5/14  -DC Melatonin and standing Xanax given likely medication related presyncope last night  -->Change Xanax to prn qHS  -Per pt's ID MD Dr. Manley there is no CI to cardiac cath or TAVR from ID perspective  -Appreciate EP interrogation of loop recorder, no events recorded. device is at EOS. rec'd to be removed by pt's EP MD  -CCU dispo pending work up    MD Feng  CCU Attending

## 2018-05-03 NOTE — CONSULT NOTE ADULT - SUBJECTIVE AND OBJECTIVE BOX
KALLIE ORTEGA    [id number]    Patient is a 89y old  Female who presents with a chief complaint of NSTEMI (02 May 2018 03:12)      HPI:  89 y/o F w/PMH of CAD s/p MAIA x3 in 2012 c/b GI bleed while on Plavix, AS, palpitations s/p loop recorder, Crohn's c/b fistula and MRSA infection s/p partial bowel resection, HTN, and hypothyroidism presented to [facility] ED c/o acute onset chest pain. Hx supplemented by son, who was at bedside. Pt was flying back to [place] from [place], when she reported mild CP that started at base of lungs and progressed to center of chest. CP radiated to back and was associated w/nausea that lasted about 30 minutes. Pt was put on 2L NC and PIV placed in flight. Pt noted that her sx resolved w/oxygen, IVF, and aspirin 250mg. Pt denied SOB, productive cough, palpitations, recent illness, hx blood clots, f/c/v/d, abdominal pain, urinary symptoms, change in medications, HA, dizziness, changes in vision.    In  ED, T 98.4, HR 68, /84, RR 18, O2 96% on RA. Labs s/f D-dimer 461, trop I 0.293. EKG s/f NSR. CTA chest neg for PE. Pt given ASA 81mg and Lovenox 60mg.    Upon arrival to CCU, VS s/f /88. Pt given Lopressor 25mg PO with improvement of SBP to 150s. Anticoagulation and indication for PCI was discussed with pt and family, who was concerned about hx of GI bleed on Plavix; however, pt and family would like to proceed with alternative A/C medication despite risks of bleeding. Pt was Brilinta loaded with plan for hep gtt and cardiac cath in AM. 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  Crohns disease  CAD (coronary artery disease)  Aortic stenosis  H/O total hysterectomy  History of bowel resection  S/P drug eluting coronary stent placement        Social History:    FAMILY HISTORY:  No pertinent family history in first degree relatives      Functional Level Prior to Admission:                          12.7   6.0   )-----------( 95       ( 03 May 2018 06:16 )             38.2       05-03    138  |  102  |  14  ----------------------------<  94  4.3   |  26  |  0.96    Ca    8.6      03 May 2018 06:34  Phos  3.5     05-02  Mg     2.1     05-03    TPro  6.2  /  Alb  3.5  /  TBili  0.9  /  DBili  x   /  AST  23  /  ALT  14  /  AlkPhos  45  05-02      Vital Signs Last 24 Hrs  T(C): 35.8 (03 May 2018 06:06), Max: 36.8 (02 May 2018 17:04)  T(F): 96.5 (03 May 2018 06:06), Max: 98.2 (02 May 2018 17:04)  HR: 60 (03 May 2018 10:00) (52 - 74)  BP: 116/63 (03 May 2018 10:00) (86/51 - 182/81)  BP(mean): 89 (03 May 2018 10:00) (64 - 129)  RR: 28 (03 May 2018 10:00) (10 - 36)  SpO2: 100% (03 May 2018 10:00) (94% - 100%)      PHYSICAL EXAM:  This 89 y-o female was admitted on 05/02/18 with  acute onset chest pain.  h/o CAD, s/p MAIA in 2012.   Crohns  disease     s/p  partial bowel resection.  MRSA.  Lives alone.  Was independent.  05/02/18  Echo reports reviwed Ef 60-65 %.  05/03/18 EKG NSR.      Constitutional:  lying in bed comfortably.  Stable    Eyes:  neg.    ENMT:  neg.    Neck:  supple, no neck pain    Breasts:    Back:  no back pain    Respiratory:  no SOB    Cardiovascular:  NO CHEST PAIN OR PALPITATION    Gastrointestinal: NO ABD.  PAIN    Genitourinary:    Rectal:    Extremities: FULL rom 4 ext., Lt knee swollen and mild pain on flexion, h/o local  injection    Vascular:    Neurological: Deconditioned, no focal deficits, 4/5.  No  cranial N. deficits.  Bed mobility with contact  guarding. Able to stand with minimal assistance at this time.    Skin:    Lymph Nodes:    Musculoskeletal:    Psychiatric:            Impression:  1. NSTMI  2. Deconditioned  3. Lt knee, swelling / effusion  / OA    Recommendations:  1. PT for therapeutic ex.  and  gait training  2. ?  home  PT

## 2018-05-03 NOTE — PROGRESS NOTE ADULT - ASSESSMENT
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    Problem/Plan - 1:  ·  Problem: NSTEMI (non-ST elevated myocardial infarction).  Plan: Troponinemia resolved.  Likely 2/2 severe AS, less likely coronary occlusion  C/w ASA, Brilinta, heparin gtt, lipitor 80  cath today with possible balloon valvuloplasty  CT head, cardiac, A/P today  PICC today for contrast and IV access.     Problem/Plan - 2:  ·  Problem: Nonrheumatic aortic valve stenosis.  Plan: -severe, symptomatic (presyncope)  -for possible balloon valvuloplasty today  -avoid preload reduction.     Problem/Plan - 3:  ·  Problem: Coronary artery disease involving native coronary artery of native heart, angina presence unspecified.  Plan: C/w lipitor, ASA, brilinta, heparin gtt  C today.     Problem/Plan - 4:  ·  Problem: Hypothyroidism, unspecified type.  Plan: c/w synthroid.     Problem/Plan - 5:  ·  Problem: Crohn's disease with complication, unspecified gastrointestinal tract location.  Plan: c/w Humira once every other week.     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 Plan: c/w minocycline 100 mg qd as per outpt ID.    Problem/Plan - 7:  ·  Problem: Anxiety.  Plan: c/w Xanax 0.125 mg QHS PRN    #presyncope  likely 2/2 polypharmacy.  d/c'ed melatonin.  Xanax only QHS now, PRN.     Problem/Plan - 8:  ·  Problem: Nutrition, metabolism, and development symptoms.  Plan: F:  none  E:  replete PRN  N:  NPO

## 2018-05-03 NOTE — PROGRESS NOTE ADULT - SUBJECTIVE AND OBJECTIVE BOX
89 y/o F w/PMH of CAD s/p MAIA x3 in 2012 c/b GI bleed while on Plavix, AS, palpitations s/p loop recorder, Crohn's c/b fistula and MRSA infection s/p partial bowel resection, HTN, and hypothyroidism presented to [facility] ED c/o acute onset chest pain. Hx supplemented by son, who was at bedside. Pt was flying back to [place] from [place], when she reported mild CP that started at base of lungs and progressed to center of chest. CP radiated to back and was associated w/nausea that lasted about 30 minutes. Pt was put on 2L NC and PIV placed in flight. Pt noted that her sx resolved w/oxygen, IVF, and aspirin 250mg. Pt denied SOB, productive cough, palpitations, recent illness, hx blood clots, f/c/v/d, abdominal pain, urinary symptoms, change in medications, HA, dizziness, changes in vision.    In  ED, T 98.4, HR 68, /84, RR 18, O2 96% on RA. Labs s/f D-dimer 461, trop I 0.293. EKG s/f NSR. CTA chest neg for PE. Pt given ASA 81mg and Lovenox 60mg.    Upon arrival to CCU, VS s/f /88. Pt given Lopressor 25mg PO with improvement of SBP to 150s. Anticoagulation and indication for PCI was discussed with pt and family, who was concerned about hx of GI bleed on Plavix; however, pt and family would like to proceed with alternative A/C medication despite risks of bleeding. Pt was Brilinta loaded with plan for hep gtt and cardiac cath in AM.    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    Past Surgical History:  H/O total hysterectomy    History of bowel resection    S/P drug eluting coronary stent placement.    Pt.S&E@BS in AM on CCU        Episode of presyncope during transfer from chair to bed last night at 10 pm after receiving Xanax and melatonin.  Xanax was 4-6 hours after prior dose.  CBC, BMP unremarkable.  K+ repleted.  Cardiac enzymes negative.  Melatonin and xanax d/c'ed.  No further episodes.  Heparin gtt reduced for PTT 94 at 10 pm.  Carotid dopplers performed.  PFTs performed.     Denies CP, palpitations,    ROS otherwise negative.  ICU Vital Signs Last 24 Hrs  T(C): 36.6 (03 May 2018 12:00), Max: 36.6 (03 May 2018 12:00)  T(F): 97.9 (03 May 2018 12:00), Max: 97.9 (03 May 2018 12:00)  HR: 72 (03 May 2018 14:00) (52 - 72)  BP: 104/64 (03 May 2018 14:00) (86/51 - 182/72)  BP(mean): 86 (03 May 2018 14:00) (64 - 112)  ABP: --  ABP(mean): --  RR: 20 (03 May 2018 14:00) (10 - 36)  SpO2: 99% (03 May 2018 14:00) (94% - 100%)    PHYSICAL EXAM:    General:  NAD,   HENT:  EOMI, PERRL.  WNL.    Neck:  Trachea midline.  No JVD, LAD, or thyromegaly.  Heart:  S1S2 w/ 3/6 TAMIKO heard best at apex/RUSB radiating toward carotids  Lungs:  CTAB no wheezing, rhonchi or rales.  No accessory muscle use.  No respiratory distress.  Abdomen:   soft, nontender, nondistended.     Vascular:  Peripheral pulses palpable  Extremities:  trace LE edema bilaterally.  Back:  No CVA tenderness  Neuro:  AOx3, no facial asymmetry, nonfocal, no slurred speech  Skin:  No rash    MEDICATIONS  (STANDING):  aspirin  chewable 81 milliGRAM(s) Oral daily  atorvastatin 80 milliGRAM(s) Oral at bedtime  chlorhexidine 0.12% Liquid 5 milliLiter(s) Swish and Spit once  chlorhexidine 2% Cloths 1 Application(s) Topical daily  heparin  Infusion 700 Unit(s)/Hr (7 mL/Hr) IV Continuous <Continuous>  levothyroxine 88 MICROGram(s) Oral daily  metoprolol tartrate 12.5 milliGRAM(s) Oral every 12 hours  minocycline 100 milliGRAM(s) Oral daily  multivitamin 1 Tablet(s) Oral daily  pantoprazole    Tablet 40 milliGRAM(s) Oral before breakfast  polyethylene glycol 3350 17 Gram(s) Oral daily  ticagrelor 90 milliGRAM(s) Oral two times a day    MEDICATIONS  (PRN):      Allergies    No Known Allergies    Intolerances      aspirin  chewable 81 milliGRAM(s) Oral daily  atorvastatin 80 milliGRAM(s) Oral at bedtime  chlorhexidine 0.12% Liquid 5 milliLiter(s) Swish and Spit once  chlorhexidine 2% Cloths 1 Application(s) Topical daily  heparin  Infusion 700 Unit(s)/Hr IV Continuous <Continuous>  levothyroxine 88 MICROGram(s) Oral daily  metoprolol tartrate 12.5 milliGRAM(s) Oral every 12 hours  minocycline 100 milliGRAM(s) Oral daily  multivitamin 1 Tablet(s) Oral daily  pantoprazole    Tablet 40 milliGRAM(s) Oral before breakfast  polyethylene glycol 3350 17 Gram(s) Oral daily  ticagrelor 90 milliGRAM(s) Oral two times a day      LABS:                          11.7   6.7   )-----------( 124      ( 02 May 2018 23:30 )             36.2     05-02    134<L>  |  96  |  15  ----------------------------<  241<H>  3.5   |  23  |  0.89    Ca    8.4      02 May 2018 23:30  Phos  3.5     05-02  Mg     2.0     05-02    TPro  6.2  /  Alb  3.5  /  TBili  0.9  /  DBili  x   /  AST  23  /  ALT  14  /  AlkPhos  45  05-02    PT/INR - ( 02 May 2018 01:18 )   PT: 11.1 sec;   INR: 1.00          PTT - ( 02 May 2018 22:51 )  PTT:94.6 sec

## 2018-05-04 LAB
ALBUMIN SERPL ELPH-MCNC: 3.2 G/DL — LOW (ref 3.3–5)
ALBUMIN SERPL ELPH-MCNC: 3.2 G/DL — LOW (ref 3.3–5)
ALBUMIN SERPL ELPH-MCNC: 3.7 G/DL — SIGNIFICANT CHANGE UP (ref 3.3–5)
ALP SERPL-CCNC: 38 U/L — LOW (ref 40–120)
ALP SERPL-CCNC: 42 U/L — SIGNIFICANT CHANGE UP (ref 40–120)
ALP SERPL-CCNC: 42 U/L — SIGNIFICANT CHANGE UP (ref 40–120)
ALT FLD-CCNC: 10 U/L — SIGNIFICANT CHANGE UP (ref 10–45)
ALT FLD-CCNC: 12 U/L — SIGNIFICANT CHANGE UP (ref 10–45)
ALT FLD-CCNC: 9 U/L — LOW (ref 10–45)
ANION GAP SERPL CALC-SCNC: 12 MMOL/L — SIGNIFICANT CHANGE UP (ref 5–17)
ANION GAP SERPL CALC-SCNC: 13 MMOL/L — SIGNIFICANT CHANGE UP (ref 5–17)
ANION GAP SERPL CALC-SCNC: 14 MMOL/L — SIGNIFICANT CHANGE UP (ref 5–17)
APTT BLD: 31 SEC — SIGNIFICANT CHANGE UP (ref 27.5–37.4)
APTT BLD: 34.4 SEC — SIGNIFICANT CHANGE UP (ref 27.5–37.4)
AST SERPL-CCNC: 19 U/L — SIGNIFICANT CHANGE UP (ref 10–40)
AST SERPL-CCNC: 20 U/L — SIGNIFICANT CHANGE UP (ref 10–40)
AST SERPL-CCNC: 22 U/L — SIGNIFICANT CHANGE UP (ref 10–40)
BASOPHILS NFR BLD AUTO: 0.2 % — SIGNIFICANT CHANGE UP (ref 0–2)
BILIRUB SERPL-MCNC: 0.6 MG/DL — SIGNIFICANT CHANGE UP (ref 0.2–1.2)
BILIRUB SERPL-MCNC: 0.7 MG/DL — SIGNIFICANT CHANGE UP (ref 0.2–1.2)
BILIRUB SERPL-MCNC: 0.7 MG/DL — SIGNIFICANT CHANGE UP (ref 0.2–1.2)
BUN SERPL-MCNC: 13 MG/DL — SIGNIFICANT CHANGE UP (ref 7–23)
BUN SERPL-MCNC: 15 MG/DL — SIGNIFICANT CHANGE UP (ref 7–23)
BUN SERPL-MCNC: 17 MG/DL — SIGNIFICANT CHANGE UP (ref 7–23)
CALCIUM SERPL-MCNC: 7.8 MG/DL — LOW (ref 8.4–10.5)
CALCIUM SERPL-MCNC: 8.2 MG/DL — LOW (ref 8.4–10.5)
CALCIUM SERPL-MCNC: 8.4 MG/DL — SIGNIFICANT CHANGE UP (ref 8.4–10.5)
CHLORIDE SERPL-SCNC: 101 MMOL/L — SIGNIFICANT CHANGE UP (ref 96–108)
CHLORIDE SERPL-SCNC: 103 MMOL/L — SIGNIFICANT CHANGE UP (ref 96–108)
CHLORIDE SERPL-SCNC: 99 MMOL/L — SIGNIFICANT CHANGE UP (ref 96–108)
CO2 SERPL-SCNC: 22 MMOL/L — SIGNIFICANT CHANGE UP (ref 22–31)
CO2 SERPL-SCNC: 22 MMOL/L — SIGNIFICANT CHANGE UP (ref 22–31)
CO2 SERPL-SCNC: 23 MMOL/L — SIGNIFICANT CHANGE UP (ref 22–31)
CREAT SERPL-MCNC: 0.95 MG/DL — SIGNIFICANT CHANGE UP (ref 0.5–1.3)
CREAT SERPL-MCNC: 1.06 MG/DL — SIGNIFICANT CHANGE UP (ref 0.5–1.3)
CREAT SERPL-MCNC: 1.11 MG/DL — SIGNIFICANT CHANGE UP (ref 0.5–1.3)
EOSINOPHIL NFR BLD AUTO: 0.9 % — SIGNIFICANT CHANGE UP (ref 0–6)
GAS PNL BLDA: SIGNIFICANT CHANGE UP
GAS PNL BLDA: SIGNIFICANT CHANGE UP
GLUCOSE BLDC GLUCOMTR-MCNC: 161 MG/DL — HIGH (ref 70–99)
GLUCOSE SERPL-MCNC: 174 MG/DL — HIGH (ref 70–99)
GLUCOSE SERPL-MCNC: 79 MG/DL — SIGNIFICANT CHANGE UP (ref 70–99)
GLUCOSE SERPL-MCNC: 93 MG/DL — SIGNIFICANT CHANGE UP (ref 70–99)
HCT VFR BLD CALC: 29.7 % — LOW (ref 34.5–45)
HCT VFR BLD CALC: 31.6 % — LOW (ref 34.5–45)
HCT VFR BLD CALC: 32.2 % — LOW (ref 34.5–45)
HGB BLD-MCNC: 10.3 G/DL — LOW (ref 11.5–15.5)
HGB BLD-MCNC: 10.8 G/DL — LOW (ref 11.5–15.5)
HGB BLD-MCNC: 9.3 G/DL — LOW (ref 11.5–15.5)
INR BLD: 1.04 — SIGNIFICANT CHANGE UP (ref 0.88–1.16)
INR BLD: 1.08 — SIGNIFICANT CHANGE UP (ref 0.88–1.16)
LACTATE SERPL-SCNC: 1 MMOL/L — SIGNIFICANT CHANGE UP (ref 0.5–2)
LACTATE SERPL-SCNC: 1.1 MMOL/L — SIGNIFICANT CHANGE UP (ref 0.5–2)
LYMPHOCYTES # BLD AUTO: 18.3 % — SIGNIFICANT CHANGE UP (ref 13–44)
MAGNESIUM SERPL-MCNC: 1.7 MG/DL — SIGNIFICANT CHANGE UP (ref 1.6–2.6)
MAGNESIUM SERPL-MCNC: 1.8 MG/DL — SIGNIFICANT CHANGE UP (ref 1.6–2.6)
MAGNESIUM SERPL-MCNC: 1.9 MG/DL — SIGNIFICANT CHANGE UP (ref 1.6–2.6)
MCHC RBC-ENTMCNC: 28.5 PG — SIGNIFICANT CHANGE UP (ref 27–34)
MCHC RBC-ENTMCNC: 28.8 PG — SIGNIFICANT CHANGE UP (ref 27–34)
MCHC RBC-ENTMCNC: 30.3 PG — SIGNIFICANT CHANGE UP (ref 27–34)
MCHC RBC-ENTMCNC: 31.3 G/DL — LOW (ref 32–36)
MCHC RBC-ENTMCNC: 32.6 G/DL — SIGNIFICANT CHANGE UP (ref 32–36)
MCHC RBC-ENTMCNC: 33.5 G/DL — SIGNIFICANT CHANGE UP (ref 32–36)
MCV RBC AUTO: 88.3 FL — SIGNIFICANT CHANGE UP (ref 80–100)
MCV RBC AUTO: 90.2 FL — SIGNIFICANT CHANGE UP (ref 80–100)
MCV RBC AUTO: 91.1 FL — SIGNIFICANT CHANGE UP (ref 80–100)
MONOCYTES NFR BLD AUTO: 11.3 % — SIGNIFICANT CHANGE UP (ref 2–14)
NEUTROPHILS NFR BLD AUTO: 69.3 % — SIGNIFICANT CHANGE UP (ref 43–77)
PHOSPHATE SERPL-MCNC: 4.1 MG/DL — SIGNIFICANT CHANGE UP (ref 2.5–4.5)
PHOSPHATE SERPL-MCNC: 4.2 MG/DL — SIGNIFICANT CHANGE UP (ref 2.5–4.5)
PHOSPHATE SERPL-MCNC: 4.3 MG/DL — SIGNIFICANT CHANGE UP (ref 2.5–4.5)
PLATELET # BLD AUTO: 109 K/UL — LOW (ref 150–400)
PLATELET # BLD AUTO: 110 K/UL — LOW (ref 150–400)
PLATELET # BLD AUTO: 126 K/UL — LOW (ref 150–400)
POTASSIUM SERPL-MCNC: 3.9 MMOL/L — SIGNIFICANT CHANGE UP (ref 3.5–5.3)
POTASSIUM SERPL-MCNC: 3.9 MMOL/L — SIGNIFICANT CHANGE UP (ref 3.5–5.3)
POTASSIUM SERPL-MCNC: 4.1 MMOL/L — SIGNIFICANT CHANGE UP (ref 3.5–5.3)
POTASSIUM SERPL-SCNC: 3.9 MMOL/L — SIGNIFICANT CHANGE UP (ref 3.5–5.3)
POTASSIUM SERPL-SCNC: 3.9 MMOL/L — SIGNIFICANT CHANGE UP (ref 3.5–5.3)
POTASSIUM SERPL-SCNC: 4.1 MMOL/L — SIGNIFICANT CHANGE UP (ref 3.5–5.3)
PROT SERPL-MCNC: 5.9 G/DL — LOW (ref 6–8.3)
PROTHROM AB SERPL-ACNC: 11.6 SEC — SIGNIFICANT CHANGE UP (ref 9.8–12.7)
PROTHROM AB SERPL-ACNC: 12 SEC — SIGNIFICANT CHANGE UP (ref 9.8–12.7)
RBC # BLD: 3.26 M/UL — LOW (ref 3.8–5.2)
RBC # BLD: 3.57 M/UL — LOW (ref 3.8–5.2)
RBC # BLD: 3.58 M/UL — LOW (ref 3.8–5.2)
RBC # FLD: 14 % — SIGNIFICANT CHANGE UP (ref 10.3–16.9)
RBC # FLD: 14.1 % — SIGNIFICANT CHANGE UP (ref 10.3–16.9)
RBC # FLD: 14.2 % — SIGNIFICANT CHANGE UP (ref 10.3–16.9)
SODIUM SERPL-SCNC: 136 MMOL/L — SIGNIFICANT CHANGE UP (ref 135–145)
SODIUM SERPL-SCNC: 136 MMOL/L — SIGNIFICANT CHANGE UP (ref 135–145)
SODIUM SERPL-SCNC: 137 MMOL/L — SIGNIFICANT CHANGE UP (ref 135–145)
WBC # BLD: 5.6 K/UL — SIGNIFICANT CHANGE UP (ref 3.8–10.5)
WBC # BLD: 5.8 K/UL — SIGNIFICANT CHANGE UP (ref 3.8–10.5)
WBC # BLD: 6 K/UL — SIGNIFICANT CHANGE UP (ref 3.8–10.5)
WBC # FLD AUTO: 5.6 K/UL — SIGNIFICANT CHANGE UP (ref 3.8–10.5)
WBC # FLD AUTO: 5.8 K/UL — SIGNIFICANT CHANGE UP (ref 3.8–10.5)
WBC # FLD AUTO: 6 K/UL — SIGNIFICANT CHANGE UP (ref 3.8–10.5)

## 2018-05-04 PROCEDURE — 93010 ELECTROCARDIOGRAM REPORT: CPT

## 2018-05-04 PROCEDURE — 99232 SBSQ HOSP IP/OBS MODERATE 35: CPT

## 2018-05-04 PROCEDURE — 71045 X-RAY EXAM CHEST 1 VIEW: CPT | Mod: 26

## 2018-05-04 PROCEDURE — 93306 TTE W/DOPPLER COMPLETE: CPT | Mod: 26

## 2018-05-04 RX ORDER — OXYCODONE AND ACETAMINOPHEN 5; 325 MG/1; MG/1
1 TABLET ORAL EVERY 6 HOURS
Qty: 0 | Refills: 0 | Status: DISCONTINUED | OUTPATIENT
Start: 2018-05-04 | End: 2018-05-04

## 2018-05-04 RX ORDER — SENNA PLUS 8.6 MG/1
1 TABLET ORAL AT BEDTIME
Qty: 0 | Refills: 0 | Status: DISCONTINUED | OUTPATIENT
Start: 2018-05-04 | End: 2018-05-05

## 2018-05-04 RX ORDER — SODIUM CHLORIDE 9 MG/ML
3 INJECTION INTRAMUSCULAR; INTRAVENOUS; SUBCUTANEOUS EVERY 8 HOURS
Qty: 0 | Refills: 0 | Status: DISCONTINUED | OUTPATIENT
Start: 2018-05-04 | End: 2018-05-05

## 2018-05-04 RX ORDER — POLYETHYLENE GLYCOL 3350 17 G/17G
17 POWDER, FOR SOLUTION ORAL DAILY
Qty: 0 | Refills: 0 | Status: DISCONTINUED | OUTPATIENT
Start: 2018-05-04 | End: 2018-05-04

## 2018-05-04 RX ORDER — DOCUSATE SODIUM 100 MG
100 CAPSULE ORAL THREE TIMES A DAY
Qty: 0 | Refills: 0 | Status: DISCONTINUED | OUTPATIENT
Start: 2018-05-04 | End: 2018-05-05

## 2018-05-04 RX ORDER — MUPIROCIN 20 MG/G
1 OINTMENT TOPICAL
Qty: 0 | Refills: 0 | Status: DISCONTINUED | OUTPATIENT
Start: 2018-05-04 | End: 2018-05-05

## 2018-05-04 RX ORDER — MUPIROCIN 20 MG/G
1 OINTMENT TOPICAL
Qty: 0 | Refills: 0 | Status: DISCONTINUED | OUTPATIENT
Start: 2018-05-04 | End: 2018-05-04

## 2018-05-04 RX ORDER — ACETAMINOPHEN 500 MG
1000 TABLET ORAL ONCE
Qty: 0 | Refills: 0 | Status: COMPLETED | OUTPATIENT
Start: 2018-05-04 | End: 2018-05-04

## 2018-05-04 RX ORDER — ALPRAZOLAM 0.25 MG
0.25 TABLET ORAL THREE TIMES A DAY
Qty: 0 | Refills: 0 | Status: DISCONTINUED | OUTPATIENT
Start: 2018-05-04 | End: 2018-05-04

## 2018-05-04 RX ORDER — ALBUMIN HUMAN 25 %
250 VIAL (ML) INTRAVENOUS
Qty: 0 | Refills: 0 | Status: COMPLETED | OUTPATIENT
Start: 2018-05-04 | End: 2018-05-04

## 2018-05-04 RX ORDER — LIDOCAINE 4 G/100G
1 CREAM TOPICAL EVERY 24 HOURS
Qty: 0 | Refills: 0 | Status: DISCONTINUED | OUTPATIENT
Start: 2018-05-04 | End: 2018-05-05

## 2018-05-04 RX ADMIN — CHLORHEXIDINE GLUCONATE 1 APPLICATION(S): 213 SOLUTION TOPICAL at 07:10

## 2018-05-04 RX ADMIN — SENNA PLUS 1 TABLET(S): 8.6 TABLET ORAL at 22:35

## 2018-05-04 RX ADMIN — Medication 81 MILLIGRAM(S): at 12:53

## 2018-05-04 RX ADMIN — Medication 0.25 MILLIGRAM(S): at 13:25

## 2018-05-04 RX ADMIN — Medication 100 MILLIGRAM(S): at 00:48

## 2018-05-04 RX ADMIN — Medication 100 MILLIGRAM(S): at 22:35

## 2018-05-04 RX ADMIN — Medication 100 MILLIGRAM(S): at 14:55

## 2018-05-04 RX ADMIN — Medication 125 MILLILITER(S): at 15:30

## 2018-05-04 RX ADMIN — Medication 1000 MILLIGRAM(S): at 08:05

## 2018-05-04 RX ADMIN — CHLORHEXIDINE GLUCONATE 5 MILLILITER(S): 213 SOLUTION TOPICAL at 10:15

## 2018-05-04 RX ADMIN — LIDOCAINE 1 PATCH: 4 CREAM TOPICAL at 07:49

## 2018-05-04 RX ADMIN — PANTOPRAZOLE SODIUM 40 MILLIGRAM(S): 20 TABLET, DELAYED RELEASE ORAL at 06:55

## 2018-05-04 RX ADMIN — POLYETHYLENE GLYCOL 3350 17 GRAM(S): 17 POWDER, FOR SOLUTION ORAL at 06:55

## 2018-05-04 RX ADMIN — Medication 100 MILLIGRAM(S): at 10:14

## 2018-05-04 RX ADMIN — CHLORHEXIDINE GLUCONATE 5 MILLILITER(S): 213 SOLUTION TOPICAL at 06:56

## 2018-05-04 RX ADMIN — MINOCYCLINE HYDROCHLORIDE 100 MILLIGRAM(S): 45 TABLET, EXTENDED RELEASE ORAL at 12:53

## 2018-05-04 RX ADMIN — Medication 125 MILLILITER(S): at 14:57

## 2018-05-04 RX ADMIN — Medication 1 ENEMA: at 17:29

## 2018-05-04 RX ADMIN — SODIUM CHLORIDE 3 MILLILITER(S): 9 INJECTION INTRAMUSCULAR; INTRAVENOUS; SUBCUTANEOUS at 22:24

## 2018-05-04 RX ADMIN — Medication 10 MILLIGRAM(S): at 16:20

## 2018-05-04 RX ADMIN — Medication 400 MILLIGRAM(S): at 07:49

## 2018-05-04 RX ADMIN — SODIUM CHLORIDE 3 MILLILITER(S): 9 INJECTION INTRAMUSCULAR; INTRAVENOUS; SUBCUTANEOUS at 14:51

## 2018-05-04 RX ADMIN — ATORVASTATIN CALCIUM 80 MILLIGRAM(S): 80 TABLET, FILM COATED ORAL at 22:35

## 2018-05-04 RX ADMIN — MUPIROCIN 1 APPLICATION(S): 20 OINTMENT TOPICAL at 18:29

## 2018-05-04 RX ADMIN — Medication 1 TABLET(S): at 12:52

## 2018-05-04 RX ADMIN — Medication 88 MICROGRAM(S): at 07:22

## 2018-05-04 RX ADMIN — LIDOCAINE 1 PATCH: 4 CREAM TOPICAL at 00:00

## 2018-05-04 NOTE — PROGRESS NOTE ADULT - SUBJECTIVE AND OBJECTIVE BOX
INTERVAL/OVERNIGHT EVENTS:      SUBJECTIVE:  Seen and examined at bedside.    ROS otherwise negative.    VITAL SIGNS:  T(F): 98.1 (05-04-18 @ 05:00)  HR: 66 (05-04-18 @ 05:00)  BP: 104/64 (05-03-18 @ 14:00)  RR: 22 (05-04-18 @ 05:00)  SpO2: 100% (05-04-18 @ 05:00)  Wt(kg): --    PHYSICAL EXAM:        MEDICATIONS  (STANDING):  adalimumab Injectable 40 milliGRAM(s) SubCutaneous every 2 weeks  aspirin  chewable 81 milliGRAM(s) Oral daily  atorvastatin 80 milliGRAM(s) Oral at bedtime  ceFAZolin   IVPB 2000 milliGRAM(s) IV Intermittent every 8 hours  chlorhexidine 0.12% Liquid 5 milliLiter(s) Swish and Spit every 4 hours  chlorhexidine 2% Cloths 1 Application(s) Topical daily  heparin  Injectable 5000 Unit(s) SubCutaneous every 8 hours  levothyroxine 88 MICROGram(s) Oral daily  meperidine     Injectable 25 milliGRAM(s) IV Push once  minocycline 100 milliGRAM(s) Oral daily  multivitamin 1 Tablet(s) Oral daily  pantoprazole    Tablet 40 milliGRAM(s) Oral before breakfast  polyethylene glycol 3350 17 Gram(s) Oral daily  sodium chloride 0.9% lock flush 20 milliLiter(s) IV Push once  sodium chloride 0.9%. 1000 milliLiter(s) (10 mL/Hr) IV Continuous <Continuous>  ticagrelor 90 milliGRAM(s) Oral two times a day    MEDICATIONS  (PRN):  sodium chloride 0.9% lock flush 10 milliLiter(s) IV Push every 1 hour PRN After each medication administration  sodium chloride 0.9% lock flush 10 milliLiter(s) IV Push every 12 hours PRN Lumen of catheter NOT used      Allergies    fentanyl (Unknown)    Intolerances      adalimumab Injectable 40 milliGRAM(s) SubCutaneous every 2 weeks  aspirin  chewable 81 milliGRAM(s) Oral daily  atorvastatin 80 milliGRAM(s) Oral at bedtime  ceFAZolin   IVPB 2000 milliGRAM(s) IV Intermittent every 8 hours  chlorhexidine 0.12% Liquid 5 milliLiter(s) Swish and Spit every 4 hours  chlorhexidine 2% Cloths 1 Application(s) Topical daily  heparin  Injectable 5000 Unit(s) SubCutaneous every 8 hours  levothyroxine 88 MICROGram(s) Oral daily  meperidine     Injectable 25 milliGRAM(s) IV Push once  minocycline 100 milliGRAM(s) Oral daily  multivitamin 1 Tablet(s) Oral daily  pantoprazole    Tablet 40 milliGRAM(s) Oral before breakfast  polyethylene glycol 3350 17 Gram(s) Oral daily  sodium chloride 0.9% lock flush 10 milliLiter(s) IV Push every 1 hour PRN  sodium chloride 0.9% lock flush 10 milliLiter(s) IV Push every 12 hours PRN  sodium chloride 0.9% lock flush 20 milliLiter(s) IV Push once  sodium chloride 0.9%. 1000 milliLiter(s) IV Continuous <Continuous>  ticagrelor 90 milliGRAM(s) Oral two times a day      LABS:                          10.8   5.6   )-----------( 110      ( 04 May 2018 03:10 )             32.2     05-04    137  |  103  |  13  ----------------------------<  79  4.1   |  22  |  0.95    Ca    8.4      04 May 2018 03:11  Phos  4.2     05-04  Mg     1.9     05-04    TPro  5.9<L>  /  Alb  3.2<L>  /  TBili  0.7  /  DBili  x   /  AST  22  /  ALT  12  /  AlkPhos  42  05-04    PT/INR - ( 04 May 2018 03:11 )   PT: 11.6 sec;   INR: 1.04          PTT - ( 04 May 2018 03:11 )  PTT:34.4 sec  Urinalysis Basic - ( 03 May 2018 09:55 )    Color: Yellow / Appearance: Clear / SG: <=1.005 / pH: x  Gluc: x / Ketone: NEGATIVE  / Bili: Negative / Urobili: 0.2 E.U./dL   Blood: x / Protein: NEGATIVE mg/dL / Nitrite: POSITIVE   Leuk Esterase: Small / RBC: < 5 /HPF / WBC Many /HPF   Sq Epi: x / Non Sq Epi: 0-5 /HPF / Bacteria: Present /HPF        RADIOLOGY & ADDITIONAL TESTS:  All imaging reviewed.

## 2018-05-04 NOTE — CONSULT NOTE ADULT - ASSESSMENT
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 Joseph Bear)  Please feel free to call me with any questions at [phone]

## 2018-05-04 NOTE — PROGRESS NOTE ADULT - PROBLEM SELECTOR PLAN 3
Not clear to me what te event on the plane was, NSTEMI vs CHF.  PE was r/o.  Reasonable to assume it was an NSTEMI and treat as such.
C/w lipitor, ASA, brilinta, heparin gtt  C today

## 2018-05-04 NOTE — DIETITIAN INITIAL EVALUATION ADULT. - OTHER INFO
89y/o F s/p BAV. Pt seen resting in bed with family at bedside. She reports having an appetite and is hungry. Consumed Cereal and lactaid for breakfast. Pt reports being excited for lunch. Denies N/V/D, pain, and mechanical issues with po intake. No BM x4 days per pt. Provide bowel regimen and discussed nutrition and constipation. PTA she reports eating well and denies wt changes. She used to take ONS, but reports not needing them anymore 2/2 good intake. Encouraged a heart healthy diet. Will follow.

## 2018-05-04 NOTE — PROGRESS NOTE ADULT - SUBJECTIVE AND OBJECTIVE BOX
INTERVAL HISTORY:  s/p PTAV  	  MEDICATIONS:  ceFAZolin   IVPB 2000 milliGRAM(s) IV Intermittent every 8 hours  minocycline 100 milliGRAM(s) Oral daily      ALPRAZolam 0.25 milliGRAM(s) Oral three times a day PRN  oxyCODONE    5 mG/acetaminophen 325 mG 1 Tablet(s) Oral every 6 hours PRN    docusate sodium 100 milliGRAM(s) Oral three times a day  pantoprazole    Tablet 40 milliGRAM(s) Oral before breakfast  polyethylene glycol 3350 17 Gram(s) Oral daily  polyethylene glycol 3350 17 Gram(s) Oral daily PRN  senna 1 Tablet(s) Oral at bedtime    atorvastatin 80 milliGRAM(s) Oral at bedtime  levothyroxine 88 MICROGram(s) Oral daily    adalimumab Injectable 40 milliGRAM(s) SubCutaneous every 2 weeks  aspirin  chewable 81 milliGRAM(s) Oral daily  chlorhexidine 0.12% Liquid 5 milliLiter(s) Swish and Spit every 4 hours  chlorhexidine 2% Cloths 1 Application(s) Topical daily  lidocaine   Patch 1 Patch Transdermal every 24 hours  multivitamin 1 Tablet(s) Oral daily  sodium chloride 0.9% lock flush 10 milliLiter(s) IV Push every 1 hour PRN  sodium chloride 0.9% lock flush 10 milliLiter(s) IV Push every 12 hours PRN  sodium chloride 0.9% lock flush 20 milliLiter(s) IV Push once  sodium chloride 0.9%. 1000 milliLiter(s) IV Continuous <Continuous>        PHYSICAL EXAM:  T(C): 36.7 (05-04-18 @ 05:00), Max: 37.2 (05-03-18 @ 22:05)  HR: 74 (05-04-18 @ 07:00) (56 - 74)  BP: 104/64 (05-03-18 @ 14:00) (104/64 - 182/72)  RR: 29 (05-04-18 @ 07:00) (10 - 32)  SpO2: 98% (05-04-18 @ 07:00) (94% - 100%)  Wt(kg): --  I&O's Summary    03 May 2018 07:01  -  04 May 2018 07:00  --------------------------------------------------------  IN: 389 mL / OUT: 700 mL / NET: -311 mL      Height (cm): 152.4 (05-03 @ 11:03)  Weight (kg): 59.5 (05-03 @ 11:03)  BMI (kg/m2): 25.6 (05-03 @ 11:03)  BSA (m2): 1.56 (05-03 @ 11:03)    Appearance: Normal	  HEENT:   Normal oral mucosa, PERRL, EOMI	  Lymphatic: No lymphadenopathy  Cardiovascular: Normal S1 S2, No JVD, TAMIKO, No edema  Respiratory: Lungs clear to auscultation	  Psychiatry: A & O x 3, Mood & affect appropriate  Gastrointestinal:  Soft, Non-tender, + BS	  Skin: No rashes, ++ ecchymoses, No cyanosis  Neurologic: Non-focal  Extremities: Normal range of motion, No clubbing, cyanosis or edema  Vascular: Peripheral pulses palpable 2+ bilaterally    TELEMETRY: 	    ECG:  	  RADIOLOGY:   DIAGNOSTIC TESTING:  [ ] Echocardiogram:  [ ]  Catheterization:  [ ] Stress Test:    OTHER: 	    LABS:	 	    CARDIAC MARKERS:                                  10.8   5.6   )-----------( 110      ( 04 May 2018 03:10 )             32.2     05-04    137  |  103  |  13  ----------------------------<  79  4.1   |  22  |  0.95    Ca    8.4      04 May 2018 03:11  Phos  4.2     05-04  Mg     1.9     05-04    TPro  5.9<L>  /  Alb  3.2<L>  /  TBili  0.7  /  DBili  x   /  AST  22  /  ALT  12  /  AlkPhos  42  05-04    proBNP:   Lipid Profile:   HgA1c:   TSH:     ASSESSMENT/PLAN:

## 2018-05-04 NOTE — DIETITIAN INITIAL EVALUATION ADULT. - PROBLEM SELECTOR PLAN 4
c/b fistula and MRSA infection s/p partial bowel resection. Takes Humira every other Monday. While GI bleed was presumed to be from Plavix, bleeding may have been from complications of Crohns disease.  - c/w Humira 40mg SQ every other Monday  - c/w Protonix 40mg PO; on omeprazole 20mg at home  - obtain collateral from Dr. Hickey

## 2018-05-04 NOTE — DIETITIAN INITIAL EVALUATION ADULT. - PROBLEM SELECTOR PLAN 3
Pt reported hx of AS, and physical exam noted 3/6 systolic murmur  - f/u echo  - NPO for right heart cath  - consult structural heart; appreciate recs  - consult EP for interrogation of loop recorder  - CT chest

## 2018-05-04 NOTE — CONSULT NOTE ADULT - SUBJECTIVE AND OBJECTIVE BOX
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  Patient found to have severe AS and is POD #1 s/p BAV 5/3/18 now patient with complaints of severeal year history of dry itchy scalp unresponsive to ketoconazole shampoo.      MEDICATIONS:  ceFAZolin   IVPB 2000 milliGRAM(s) IV Intermittent every 8 hours  minocycline 100 milliGRAM(s) Oral daily      ALPRAZolam 0.25 milliGRAM(s) Oral three times a day PRN  oxyCODONE    5 mG/acetaminophen 325 mG 1 Tablet(s) Oral every 6 hours PRN    docusate sodium 100 milliGRAM(s) Oral three times a day  pantoprazole    Tablet 40 milliGRAM(s) Oral before breakfast  polyethylene glycol 3350 17 Gram(s) Oral daily  polyethylene glycol 3350 17 Gram(s) Oral daily PRN  senna 1 Tablet(s) Oral at bedtime    atorvastatin 80 milliGRAM(s) Oral at bedtime  levothyroxine 88 MICROGram(s) Oral daily    adalimumab Injectable 40 milliGRAM(s) SubCutaneous every 2 weeks  aspirin  chewable 81 milliGRAM(s) Oral daily  chlorhexidine 0.12% Liquid 5 milliLiter(s) Swish and Spit every 4 hours  chlorhexidine 2% Cloths 1 Application(s) Topical daily  lidocaine   Patch 1 Patch Transdermal every 24 hours  multivitamin 1 Tablet(s) Oral daily  sodium chloride 0.9% lock flush 10 milliLiter(s) IV Push every 1 hour PRN  sodium chloride 0.9% lock flush 10 milliLiter(s) IV Push every 12 hours PRN  sodium chloride 0.9% lock flush 20 milliLiter(s) IV Push once  sodium chloride 0.9%. 1000 milliLiter(s) IV Continuous <Continuous>        PHYSICAL EXAM:  T(C): 36.7 (05-04-18 @ 05:00), Max: 37.2 (05-03-18 @ 22:05)  HR: 74 (05-04-18 @ 07:00) (56 - 74)  BP: 104/64 (05-03-18 @ 14:00) (104/64 - 182/72)  RR: 29 (05-04-18 @ 07:00) (10 - 32)  SpO2: 98% (05-04-18 @ 07:00) (94% - 100%)  Wt(kg): --  I&O's Summary    03 May 2018 07:01  -  04 May 2018 07:00  --------------------------------------------------------  IN: 389 mL / OUT: 700 mL / NET: -311 mL      Height (cm): 152.4 (05-03 @ 11:03)  Weight (kg): 59.5 (05-03 @ 11:03)  BMI (kg/m2): 25.6 (05-03 @ 11:03)  BSA (m2): 1.56 (05-03 @ 11:03)    Appearance: Normal	  HEENT:   Normal oral mucosa, PERRL, EOMI	  Lymphatic: No lymphadenopathy  Cardiovascular: Normal S1 S2, No JVD, TAMIKO, No edema  Respiratory: Lungs clear to auscultation	  Psychiatry: A & O x 3, Mood & affect appropriate  Gastrointestinal:  Soft, Non-tender, + BS	  Skin:  ++ ecchymoses b/l UE/LE, micaceous scaling of scalp and right external ear canal and right retroauricular area, crusting lateral commissure of left eyelid  Neurologic: Non-focal  Extremities: Normal range of motion, No clubbing, cyanosis or edema  Vascular: Peripheral pulses palpable 2+ bilaterally

## 2018-05-04 NOTE — DIETITIAN INITIAL EVALUATION ADULT. - PROBLEM SELECTOR PLAN 6
Pt takes alprazolam 0.125mg in AM and lunch and 0.25mg at bedtime for mild anxiety  - holding for now

## 2018-05-04 NOTE — PROGRESS NOTE ADULT - PROBLEM SELECTOR PROBLEM 1
Coronary artery disease involving native coronary artery of native heart, angina presence unspecified
NSTEMI (non-ST elevated myocardial infarction)

## 2018-05-04 NOTE — PROGRESS NOTE ADULT - SUBJECTIVE AND OBJECTIVE BOX
89 y/o F w/PMH of CAD s/p MAIA x3 in 2012 c/b GI bleed while on Plavix, AS, palpitations s/p loop recorder, Crohn's c/b fistula and MRSA infection s/p partial bowel resection, HTN, and hypothyroidism presented to [facility] ED c/o acute onset chest pain. Hx supplemented by son, who was at bedside. Pt was flying back to [place] from [place], when she reported mild CP that started at base of lungs and progressed to center of chest. CP radiated to back and was associated w/nausea that lasted about 30 minutes. Pt was put on 2L NC and PIV placed in flight. Pt noted that her sx resolved w/oxygen, IVF, and aspirin 250mg. Pt denied SOB, productive cough, palpitations, recent illness, hx blood clots, f/c/v/d, abdominal pain, urinary symptoms, change in medications, HA, dizziness, changes in vision.    In  ED, T 98.4, HR 68, /84, RR 18, O2 96% on RA. Labs s/f D-dimer 461, trop I 0.293. EKG s/f NSR. CTA chest neg for PE. Pt given ASA 81mg and Lovenox 60mg.    Upon arrival to CCU, VS s/f /88. Pt given Lopressor 25mg PO with improvement of SBP to 150s. Anticoagulation and indication for PCI was discussed with pt and family, who was concerned about hx of GI bleed on Plavix; however, pt and family would like to proceed with alternative A/C medication despite risks of bleeding. Pt was Brilinta loaded with plan for hep gtt and cardiac cath in AM.    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    Past Surgical History:  H/O total hysterectomy    History of bowel resection    S/P drug eluting coronary stent placement.    Pt.S&E@BS in AM on CCU            	  MEDICATIONS:    ceFAZolin   IVPB 2000 milliGRAM(s) IV Intermittent every 8 hours  minocycline 100 milliGRAM(s) Oral daily        bisacodyl Suppository 10 milliGRAM(s) Rectal daily PRN  docusate sodium 100 milliGRAM(s) Oral three times a day  pantoprazole    Tablet 40 milliGRAM(s) Oral before breakfast  polyethylene glycol 3350 17 Gram(s) Oral daily  senna 1 Tablet(s) Oral at bedtime  sodium biphosphate Rectal Enema 1 Enema Rectal once    atorvastatin 80 milliGRAM(s) Oral at bedtime  levothyroxine 88 MICROGram(s) Oral daily    adalimumab Injectable 40 milliGRAM(s) SubCutaneous every 2 weeks  albumin human  5% IVPB 250 milliLiter(s) IV Intermittent every 30 minutes  aspirin  chewable 81 milliGRAM(s) Oral daily  lidocaine   Patch 1 Patch Transdermal every 24 hours  multivitamin 1 Tablet(s) Oral daily  mupirocin 2% Ointment 1 Application(s) Topical two times a day  sodium chloride 0.9% lock flush 3 milliLiter(s) IV Push every 8 hours  sodium chloride 0.9% lock flush 10 milliLiter(s) IV Push every 1 hour PRN  sodium chloride 0.9% lock flush 10 milliLiter(s) IV Push every 12 hours PRN  sodium chloride 0.9% lock flush 20 milliLiter(s) IV Push once      Complaint:     Otherwise 12 point review of systems is normal.    PHYSICAL EXAM:    Constitutional: NAD  Eyes: PERRL, EOMI, sclera non-icteric  Neck: supple, no masses, no JVD  Respiratory: CTA b/l, good air entry b/l, no wheezing, rhonchi, rales, with normal respiratory effort and no intercostal retractions  Cardiovascular:  normal S1S2, no M/R/G  Gastrointestinal: soft, NTND, no masses palpable, BS +  Extremities:  no c/c/e  Neurological: AAOx3    ICU Vital Signs Last 24 Hrs  T(C): 36.6 (04 May 2018 17:30), Max: 37.2 (03 May 2018 22:05)  T(F): 97.8 (04 May 2018 17:30), Max: 99 (04 May 2018 13:42)  HR: 84 (04 May 2018 14:00) (56 - 84)  BP: 121/62 (04 May 2018 14:00) (103/63 - 123/63)  BP(mean): 92 (04 May 2018 14:00) (64 - 96)  ABP: 98/42 (04 May 2018 10:00) (98/42 - 188/70)  ABP(mean): 60 (04 May 2018 10:00) (60 - 114)  RR: 28 (04 May 2018 14:00) (10 - 34)  SpO2: 97% (04 May 2018 14:00) (94% - 100%)              ECG:      CHEST X RAY    CT    MRI    MRA    CT ANGIO    CAROTID DUPLEX    DUPLEX     Echocardiogram    Catheterization:    Stress Test:     LABS:	 	  CARDIAC MARKERS:  Troponin T, Serum: <0.01 ng/mL [0.00 - 0.01] (05-02 @ 23:30)  Troponin T, Serum: 0.01 ng/mL [0.00 - 0.01] (05-02 @ 11:42)  Troponin T, Serum: 0.02 ng/mL <H> [0.00 - 0.01] (05-02 @ 01:18)  Troponin I, Serum: 0.293 ng/mL <H> [0.000 - 0.045] (05-01 @ 19:33)                              9.3    5.8   )-----------( 109      ( 04 May 2018 15:57 )             29.7     05-04    136  |  99  |  17  ----------------------------<  93  3.9   |  23  |  1.11    Ca    7.8<L>      04 May 2018 15:57  Phos  4.3     05-04  Mg     1.7     05-04    TPro  5.9<L>  /  Alb  3.7  /  TBili  0.6  /  DBili  x   /  AST  19  /  ALT  9<L>  /  AlkPhos  38<L>  05-04    proBNP: Serum Pro-Brain Natriuretic Peptide: 5694 pg/mL (05-02 @ 01:18)    Lipid Profile:   HgA1c: Hemoglobin A1C, Whole Blood: 4.9 % (05-02 @ 01:16)    TSH: Thyroid Stimulating Hormone, Serum: 0.205 uIU/mL (05-02 @ 01:18)    ASSESSMENT/PLAN: 	    Patient is a 89y old  Female who presents with severe  aortic stenosis.  S/P Balloon aortic valvuloplasty.

## 2018-05-04 NOTE — DIETITIAN INITIAL EVALUATION ADULT. - PROBLEM SELECTOR PLAN 1
Pt presenting to  hospital w/acute onset CP, found to have elevated trops with EKG without obvious signs of ischemia. Pt was given ASA/Lovenox at  hospital with initial concern for PE; however, CTA was neg for PE. Pt transferred to [facility] for continued management of NSTEMI.   - start hep gtt 12 hours after Lovenox dose (8AM)  - s/p Brilinta 180mg; start Brilinta 90mg PO BID tomorrow; pt expressed concern about hx of GI bleed on Plavix, but would like to try different a/c medication despite explained risks of bleeding  - NPO for cardiac cath in AM  - started Lipitor 80mg qd  - started Lopressor 12.5 mg PO q12h  - will start ACE as tolerated  - f/u echo  - daily EKG and CXR  - trend trop to peak  - TSH 0.205 low, A1C 4.9, Lipid panel WNL

## 2018-05-04 NOTE — DIETITIAN INITIAL EVALUATION ADULT. - PROBLEM SELECTOR PLAN 2
s/p MAIA x3, last placed in [place] in 2012 c/b GI bleed presumably from Plavix, which was discontinued after a year. Followed by Dr. Luis Hickey in [place]   -started ASA 81mg qd, Brilinta 90mg BID, and Lipitor 80mg  -obtain collateral from Dr. Hickey [phone]

## 2018-05-04 NOTE — PROGRESS NOTE ADULT - PROBLEM SELECTOR PLAN 1
Remote h/o stents.  Cathed 5/3/18, non-obstructive dz.
Troponinemia resolved.  Likely 2/2 severe AS, less likely coronary occlusion  C/w ASA, Brilinta, heparin gtt, lipitor 80  cath today with possible balloon valvuloplasty  CT head, cardiac, A/P today  PICC today for contrast and IV access

## 2018-05-04 NOTE — PROGRESS NOTE ADULT - PROBLEM SELECTOR PROBLEM 3
NSTEMI (non-ST elevated myocardial infarction)
Coronary artery disease involving native coronary artery of native heart, angina presence unspecified

## 2018-05-04 NOTE — DIETITIAN INITIAL EVALUATION ADULT. - ENERGY NEEDS
IBW 45.5Kg  %%  BMI 25.6    Utilized IBW to calculate needs, pt >120% of IBW. Adjusted for age/post-op.

## 2018-05-05 ENCOUNTER — TRANSCRIPTION ENCOUNTER (OUTPATIENT)
Age: 83
End: 2018-05-05

## 2018-05-05 VITALS — HEART RATE: 76 BPM | SYSTOLIC BLOOD PRESSURE: 166 MMHG | DIASTOLIC BLOOD PRESSURE: 59 MMHG | OXYGEN SATURATION: 98 %

## 2018-05-05 LAB
ANION GAP SERPL CALC-SCNC: 12 MMOL/L — SIGNIFICANT CHANGE UP (ref 5–17)
BASOPHILS NFR BLD AUTO: 0.2 % — SIGNIFICANT CHANGE UP (ref 0–2)
BUN SERPL-MCNC: 15 MG/DL — SIGNIFICANT CHANGE UP (ref 7–23)
CALCIUM SERPL-MCNC: 8.9 MG/DL — SIGNIFICANT CHANGE UP (ref 8.4–10.5)
CHLORIDE SERPL-SCNC: 102 MMOL/L — SIGNIFICANT CHANGE UP (ref 96–108)
CO2 SERPL-SCNC: 26 MMOL/L — SIGNIFICANT CHANGE UP (ref 22–31)
CREAT SERPL-MCNC: 0.94 MG/DL — SIGNIFICANT CHANGE UP (ref 0.5–1.3)
EOSINOPHIL NFR BLD AUTO: 2.1 % — SIGNIFICANT CHANGE UP (ref 0–6)
GLUCOSE SERPL-MCNC: 91 MG/DL — SIGNIFICANT CHANGE UP (ref 70–99)
HCT VFR BLD CALC: 31.8 % — LOW (ref 34.5–45)
HGB BLD-MCNC: 10.8 G/DL — LOW (ref 11.5–15.5)
LYMPHOCYTES # BLD AUTO: 21.9 % — SIGNIFICANT CHANGE UP (ref 13–44)
MAGNESIUM SERPL-MCNC: 1.9 MG/DL — SIGNIFICANT CHANGE UP (ref 1.6–2.6)
MCHC RBC-ENTMCNC: 31.1 PG — SIGNIFICANT CHANGE UP (ref 27–34)
MCHC RBC-ENTMCNC: 34 G/DL — SIGNIFICANT CHANGE UP (ref 32–36)
MCV RBC AUTO: 91.6 FL — SIGNIFICANT CHANGE UP (ref 80–100)
MONOCYTES NFR BLD AUTO: 11.8 % — SIGNIFICANT CHANGE UP (ref 2–14)
NEUTROPHILS NFR BLD AUTO: 64 % — SIGNIFICANT CHANGE UP (ref 43–77)
PHOSPHATE SERPL-MCNC: 4.1 MG/DL — SIGNIFICANT CHANGE UP (ref 2.5–4.5)
PLATELET # BLD AUTO: 122 K/UL — LOW (ref 150–400)
POTASSIUM SERPL-MCNC: 4 MMOL/L — SIGNIFICANT CHANGE UP (ref 3.5–5.3)
POTASSIUM SERPL-SCNC: 4 MMOL/L — SIGNIFICANT CHANGE UP (ref 3.5–5.3)
RBC # BLD: 3.47 M/UL — LOW (ref 3.8–5.2)
RBC # FLD: 14.2 % — SIGNIFICANT CHANGE UP (ref 10.3–16.9)
SODIUM SERPL-SCNC: 140 MMOL/L — SIGNIFICANT CHANGE UP (ref 135–145)
WBC # BLD: 4.8 K/UL — SIGNIFICANT CHANGE UP (ref 3.8–10.5)
WBC # FLD AUTO: 4.8 K/UL — SIGNIFICANT CHANGE UP (ref 3.8–10.5)

## 2018-05-05 PROCEDURE — 71045 X-RAY EXAM CHEST 1 VIEW: CPT | Mod: 26

## 2018-05-05 RX ORDER — ATORVASTATIN CALCIUM 80 MG/1
1 TABLET, FILM COATED ORAL
Qty: 30 | Refills: 0 | OUTPATIENT
Start: 2018-05-05 | End: 2018-06-03

## 2018-05-05 RX ORDER — OMEPRAZOLE 10 MG/1
1 CAPSULE, DELAYED RELEASE ORAL
Qty: 30 | Refills: 0 | OUTPATIENT
Start: 2018-05-05 | End: 2018-06-03

## 2018-05-05 RX ORDER — METOPROLOL TARTRATE 50 MG
12.5 TABLET ORAL EVERY 12 HOURS
Qty: 0 | Refills: 0 | Status: DISCONTINUED | OUTPATIENT
Start: 2018-05-05 | End: 2018-05-05

## 2018-05-05 RX ORDER — POLYETHYLENE GLYCOL 3350 17 G/17G
0 POWDER, FOR SOLUTION ORAL
Qty: 0 | Refills: 0 | COMMUNITY

## 2018-05-05 RX ORDER — SIMVASTATIN 20 MG/1
1 TABLET, FILM COATED ORAL
Qty: 0 | Refills: 0 | COMMUNITY

## 2018-05-05 RX ORDER — METOPROLOL TARTRATE 50 MG
1 TABLET ORAL
Qty: 0 | Refills: 0 | COMMUNITY

## 2018-05-05 RX ORDER — LEVOTHYROXINE SODIUM 125 MCG
1 TABLET ORAL
Qty: 30 | Refills: 0 | OUTPATIENT
Start: 2018-05-05 | End: 2018-06-03

## 2018-05-05 RX ORDER — LEVOTHYROXINE SODIUM 125 MCG
1 TABLET ORAL
Qty: 0 | Refills: 0 | COMMUNITY

## 2018-05-05 RX ORDER — MAGNESIUM OXIDE 400 MG ORAL TABLET 241.3 MG
400 TABLET ORAL ONCE
Qty: 0 | Refills: 0 | Status: COMPLETED | OUTPATIENT
Start: 2018-05-05 | End: 2018-05-05

## 2018-05-05 RX ORDER — MUPIROCIN 20 MG/G
1 OINTMENT TOPICAL
Qty: 1 | Refills: 0 | OUTPATIENT
Start: 2018-05-05 | End: 2018-05-11

## 2018-05-05 RX ORDER — MINOCYCLINE HYDROCHLORIDE 45 MG/1
1 TABLET, EXTENDED RELEASE ORAL
Qty: 30 | Refills: 0 | OUTPATIENT
Start: 2018-05-05 | End: 2018-06-03

## 2018-05-05 RX ORDER — ASPIRIN/CALCIUM CARB/MAGNESIUM 324 MG
1 TABLET ORAL
Qty: 30 | Refills: 0 | OUTPATIENT
Start: 2018-05-05 | End: 2018-06-03

## 2018-05-05 RX ORDER — MINOCYCLINE HYDROCHLORIDE 45 MG/1
1 TABLET, EXTENDED RELEASE ORAL
Qty: 0 | Refills: 0 | COMMUNITY

## 2018-05-05 RX ORDER — METOPROLOL TARTRATE 50 MG
0.5 TABLET ORAL
Qty: 30 | Refills: 0 | OUTPATIENT
Start: 2018-05-05 | End: 2018-06-03

## 2018-05-05 RX ORDER — OMEPRAZOLE 10 MG/1
1 CAPSULE, DELAYED RELEASE ORAL
Qty: 0 | Refills: 0 | COMMUNITY

## 2018-05-05 RX ORDER — DOCUSATE SODIUM 100 MG
1 CAPSULE ORAL
Qty: 90 | Refills: 0 | OUTPATIENT
Start: 2018-05-05 | End: 2018-06-03

## 2018-05-05 RX ORDER — SENNA PLUS 8.6 MG/1
1 TABLET ORAL
Qty: 30 | Refills: 0 | OUTPATIENT
Start: 2018-05-05 | End: 2018-06-03

## 2018-05-05 RX ADMIN — Medication 88 MICROGRAM(S): at 07:27

## 2018-05-05 RX ADMIN — Medication 81 MILLIGRAM(S): at 12:03

## 2018-05-05 RX ADMIN — MINOCYCLINE HYDROCHLORIDE 100 MILLIGRAM(S): 45 TABLET, EXTENDED RELEASE ORAL at 12:03

## 2018-05-05 RX ADMIN — MAGNESIUM OXIDE 400 MG ORAL TABLET 400 MILLIGRAM(S): 241.3 TABLET ORAL at 09:35

## 2018-05-05 RX ADMIN — Medication 12.5 MILLIGRAM(S): at 09:36

## 2018-05-05 RX ADMIN — PANTOPRAZOLE SODIUM 40 MILLIGRAM(S): 20 TABLET, DELAYED RELEASE ORAL at 07:27

## 2018-05-05 RX ADMIN — SODIUM CHLORIDE 3 MILLILITER(S): 9 INJECTION INTRAMUSCULAR; INTRAVENOUS; SUBCUTANEOUS at 14:27

## 2018-05-05 RX ADMIN — Medication 1 TABLET(S): at 12:03

## 2018-05-05 RX ADMIN — LIDOCAINE 1 PATCH: 4 CREAM TOPICAL at 07:27

## 2018-05-05 RX ADMIN — SODIUM CHLORIDE 3 MILLILITER(S): 9 INJECTION INTRAMUSCULAR; INTRAVENOUS; SUBCUTANEOUS at 06:55

## 2018-05-05 RX ADMIN — Medication 100 MILLIGRAM(S): at 07:27

## 2018-05-05 NOTE — PROGRESS NOTE ADULT - ASSESSMENT
Over 30 minutes was spent with the patient reviewing the discharge material including medications, follow up appointments, recovery, concerning symptoms, and how to contact their health care providers if they have questions

## 2018-05-05 NOTE — PROGRESS NOTE ADULT - SUBJECTIVE AND OBJECTIVE BOX
INTERVAL HISTORY:  	No chest pain or dyspnea      MEDICATIONS:  metoprolol tartrate 12.5 milliGRAM(s) Oral every 12 hours    minocycline 100 milliGRAM(s) Oral daily        bisacodyl Suppository 10 milliGRAM(s) Rectal daily PRN  docusate sodium 100 milliGRAM(s) Oral three times a day  pantoprazole    Tablet 40 milliGRAM(s) Oral before breakfast  polyethylene glycol 3350 17 Gram(s) Oral daily  senna 1 Tablet(s) Oral at bedtime    atorvastatin 80 milliGRAM(s) Oral at bedtime  levothyroxine 88 MICROGram(s) Oral daily    adalimumab Injectable 40 milliGRAM(s) SubCutaneous every 2 weeks  aspirin  chewable 81 milliGRAM(s) Oral daily  lidocaine   Patch 1 Patch Transdermal every 24 hours  magnesium oxide 400 milliGRAM(s) Oral once  multivitamin 1 Tablet(s) Oral daily  mupirocin 2% Ointment 1 Application(s) Topical two times a day  sodium chloride 0.9% lock flush 3 milliLiter(s) IV Push every 8 hours  sodium chloride 0.9% lock flush 10 milliLiter(s) IV Push every 1 hour PRN  sodium chloride 0.9% lock flush 10 milliLiter(s) IV Push every 12 hours PRN  sodium chloride 0.9% lock flush 20 milliLiter(s) IV Push once      Allergies    fentanyl (Unknown)    Intolerances        PHYSICAL EXAM:  T(C): 36.7 (05-05-18 @ 09:24), Max: 37.2 (05-04-18 @ 13:42)  HR: 82 (05-05-18 @ 09:08) (62 - 92)  BP: 118/63 (05-05-18 @ 09:08) (80/50 - 155/64)  RR: 16 (05-05-18 @ 09:08) (14 - 34)  SpO2: 98% (05-05-18 @ 09:08) (96% - 99%)  Wt(kg): --  I&O's Summary    04 May 2018 07:01  -  05 May 2018 07:00  --------------------------------------------------------  IN: 380 mL / OUT: 675 mL / NET: -295 mL    05 May 2018 07:01  -  05 May 2018 09:33  --------------------------------------------------------  IN: 240 mL / OUT: 0 mL / NET: 240 mL          Appearance: Normal	  HEENT:   Normal oral mucosa, PERRL, EOMI  Neck: FROM supple, no JVD, bruits or thyromegaly	  Lymphatic: No lymphadenopathy  Cardiovascular: Normal S1 S2, +murmur  Respiratory: Lungs clear to auscultation	  Psychiatry: A & O x 3, Mood & affect appropriate  Gastrointestinal:  Soft, NT/ND, + BS, No HSM. No masses	  Neurologic: Non-focal  Extremities: large hematoma /ecchymosis of right arm      TELEMETRY: 	    ECG:  	  RADIOLOGY:   DIAGNOSTIC TESTING:  [ ] Echocardiogram:  [ ]  Catheterization:  [ ] Stress Test:    OTHER: 	    LABS:	 	    CARDIAC MARKERS:                                  10.8   4.8   )-----------( 122      ( 05 May 2018 07:25 )             31.8     05-05    140  |  102  |  15  ----------------------------<  91  4.0   |  26  |  0.94    Ca    8.9      05 May 2018 07:25  Phos  4.1     05-05  Mg     1.9     05-05    TPro  5.9<L>  /  Alb  3.7  /  TBili  0.6  /  DBili  x   /  AST  19  /  ALT  9<L>  /  AlkPhos  38<L>  05-04    proBNP:   Lipid Profile:   HgA1c:   TSH:   PT/INR - ( 04 May 2018 10:11 )   PT: 12.0 sec;   INR: 1.08          PTT - ( 04 May 2018 10:11 )  PTT:31.0 sec  ASSESSMENT/PLAN: 	  AS/CAD-stable post procedure  Arm hematoma- Stable H/H. Discharge as per interventional team.

## 2018-05-05 NOTE — DISCHARGE NOTE ADULT - MEDICATION SUMMARY - MEDICATIONS TO TAKE
I will START or STAY ON the medications listed below when I get home from the hospital:    aspirin 81 mg oral tablet, chewable  -- 1 tab(s) by mouth once a day  -- Indication: For Heart disease    atorvastatin 80 mg oral tablet  -- 1 tab(s) by mouth once a day (at bedtime)  -- Indication: For Cholesterol    ALPRAZolam 0.25 mg oral tablet  -- 1 tab(s) by mouth 3 times a day  -- Indication: For Anxiety    metoprolol tartrate 25 mg oral tablet  -- 0.5 tab(s) by mouth every 12 hours   -- It is very important that you take or use this exactly as directed.  Do not skip doses or discontinue unless directed by your doctor.  May cause drowsiness.  Alcohol may intensify this effect.  Use care when operating dangerous machinery.  Some non-prescription drugs may aggravate your condition.  Read all labels carefully.  If a warning appears, check with your doctor before taking.  Take with food or milk.  This drug may impair the ability to drive or operate machinery.  Use care until you become familiar with its effects.    -- Indication: For Blood pressure    mupirocin 2% topical ointment  -- 1 application on skin 2 times a day to bilateral nostrils and umbilicus.   -- Indication: For Dermatitis    Humira 40 mg/0.8 mL subcutaneous kit  -- Indication: For Crohns disease    docusate sodium 100 mg oral capsule  -- 1 cap(s) by mouth 3 times a day  -- Indication: For Stool softener- hold for loose stool    senna oral tablet  -- 1 tab(s) by mouth once a day (at bedtime)  -- Indication: For Laxative- hold for loose stool    glucosamine hydrochloride 1500 mg oral tablet  -- 1 tab(s) by mouth once a day  -- Indication: For Supplement    omeprazole 20 mg oral delayed release capsule  -- 1 cap(s) by mouth once a day  -- Indication: For Stoamch protection    minocycline 100 mg oral capsule  -- 1 cap(s) by mouth once a day  -- Indication: For Anti-biotic    levothyroxine 88 mcg (0.088 mg) oral tablet  -- 1 tab(s) by mouth once a day  -- Indication: For Hypothyroidism    Multiple Vitamins oral tablet  -- 1 tab(s) by mouth once a day  -- Indication: For Vitamin    Calcium 500+D oral tablet, chewable  -- 1 tab(s) by mouth 2 times a day  -- Indication: For Vitamin

## 2018-05-05 NOTE — DISCHARGE NOTE ADULT - CARE PROVIDER_API CALL
Crow Singleton), Cardiology; Interventional Cardiology  [address] [place] [address]  4th [place] [address]  Phone: [phone]  Fax: [phone]    Jimmie Barry), Cardiovascular Disease; Internal Medicine  Cardiology [facility]  [address] [place] [id number]  Phone: [phone]  Fax: [phone]

## 2018-05-05 NOTE — DISCHARGE NOTE ADULT - PLAN OF CARE
To recover from surgery -Please follow up with Dr. Singleton. This office was closed over the weekend when you were discharged. They will call you on monday with a follow up appointment. If you dot hear from them on monday by 2pm, please call the office.  The office is located at [facility], [facility], 4th floor. Call us with any questions, #[phone].  -Please follow up Dr. Barry (cardiologist). His information is listed below. The office will call you with a follow up visit on monday.    -Please follow up with your primary care provider within 1-2 weeks of discharge.    -Walk daily as tolerated and use your incentive spirometer every hour.    -No driving or strenuous activity/exercise for 6 weeks, or until  cleared by your surgeon.    -Gently clean your incisions with anti-bacterial soap and water, pat  dry.  You may leave them open to air.    -Call your doctor if you have shortness of breath, chest pain not  relieved by pain medication, dizziness, fever >101.5, or increased  redness or drainage from incisions. -Please follow up with your dermatologist within 1 week of discharge for your history of seborrheic dermatitis. The dermatologist that saw you while inpatient recommended:  - alternate salicylic acid  Shampoo and tar shampoos Daily (over the counter)  - Clobetasol solution qhs to scalp with occlusion (over the counter)  - Cetaphil antibacterial soap for bathing (over the counter)  - muprocin ointment two times a day to nostrils and belly button (Rx sent to pharmacy)  - Use hydrogen peroxide on a Qtip to clean to clean area of left lateral eyelid followed by muprocin

## 2018-05-05 NOTE — PHYSICAL THERAPY INITIAL EVALUATION ADULT - GENERAL OBSERVATIONS, REHAB EVAL
Rec'd pt seated in bedside chair, non-acute distress, +PICC, +EKG, cleared for PT and OOB activity by SHANICE Torres. Denies pain.

## 2018-05-05 NOTE — DISCHARGE NOTE ADULT - CARE PROVIDERS DIRECT ADDRESSES
[email or web address]

## 2018-05-05 NOTE — DISCHARGE NOTE ADULT - CARE PLAN
Principal Discharge DX:	Aortic stenosis  Goal:	To recover from surgery  Assessment and plan of treatment:	-Please follow up with Dr. Singleton. This office was closed over the weekend when you were discharged. They will call you on monday with a follow up appointment. If you dot hear from them on monday by 2pm, please call the office.  The office is located at [facility], [facility], 4th floor. Call us with any questions, #[phone].  -Please follow up Dr. Barry (cardiologist). His information is listed below. The office will call you with a follow up visit on monday.    -Please follow up with your primary care provider within 1-2 weeks of discharge.    -Walk daily as tolerated and use your incentive spirometer every hour.    -No driving or strenuous activity/exercise for 6 weeks, or until  cleared by your surgeon.    -Gently clean your incisions with anti-bacterial soap and water, pat  dry.  You may leave them open to air.    -Call your doctor if you have shortness of breath, chest pain not  relieved by pain medication, dizziness, fever >101.5, or increased  redness or drainage from incisions.  Assessment and plan of treatment:	-Please follow up with your dermatologist within 1 week of discharge for your history of seborrheic dermatitis. The dermatologist that saw you while inpatient recommended:  - alternate salicylic acid  Shampoo and tar shampoos Daily (over the counter)  - Clobetasol solution qhs to scalp with occlusion (over the counter)  - Cetaphil antibacterial soap for bathing (over the counter)  - muprocin ointment two times a day to nostrils and belly button (Rx sent to pharmacy)  - Use hydrogen peroxide on a Qtip to clean to clean area of left lateral eyelid followed by muprocin

## 2018-05-05 NOTE — PHYSICAL THERAPY INITIAL EVALUATION ADULT - PERTINENT HX OF CURRENT PROBLEM, REHAB EVAL
presented to [facility] ED c/o acute onset chest pain. Hx supplemented by son, who was at bedside. Pt was flying back to [place] from [place], when she reported mild CP that started at base of lungs and progressed to center of chest. CP radiated to back and was associated w/nausea that lasted about 30 minutes. Pt was put on 2L NC and PIV placed in flight.

## 2018-05-05 NOTE — PROGRESS NOTE ADULT - SUBJECTIVE AND OBJECTIVE BOX
Patient discussed on morning rounds with Dr. Singleton/ Dr. Humphrey    Operation / Date:  [facility] 5/3/18    Surgeon: Dr. Singleton     Referring Physician: Not listed    SUBJECTIVE ASSESSMENT:  89y Female seen and examined. Today patient has no acute complaints, she feels well, denies fever, chest pain, palpitations, SOB, abdominal pain, n/v. Is ambulating without assistance, tolerating PO diet, had BM yesterday.     Hospital Course:    Vital Signs Last 24 Hrs  T(C): 36.7 (05 May 2018 09:24), Max: 37.2 (04 May 2018 13:42)  T(F): 98.1 (05 May 2018 09:24), Max: 99 (04 May 2018 13:42)  HR: 82 (05 May 2018 09:08) (62 - 92)  BP: 118/63 (05 May 2018 09:08) (80/50 - 155/64)  BP(mean): 80 (05 May 2018 09:08) (62 - 96)  RR: 16 (05 May 2018 09:08) (14 - 34)  SpO2: 98% (05 May 2018 09:08) (96% - 99%)  I&O's Detail    04 May 2018 07:01  -  05 May 2018 07:00  --------------------------------------------------------  IN:    Oral Fluid: 380 mL  Total IN: 380 mL    OUT:    Voided: 675 mL  Total OUT: 675 mL    Total NET: -295 mL      05 May 2018 07:01  -  05 May 2018 10:56  --------------------------------------------------------  IN:    Oral Fluid: 240 mL  Total IN: 240 mL    OUT:  Total OUT: 0 mL    Total NET: 240 mL      PHYSICAL EXAM:    General: Patient lying comfortably in bed, no acute distress     Neurological: Alert and oriented. No focal neurological deficits     Cardiovascular: S1S2, RRR, no murmurs appreciated on exam     Respiratory: Clear to ausculation bilaterally, no wheeze/rhonchi/rales     Gastrointestinal: Abdomen soft, non tender, non distended     Extremities: Warm and well perfused. No edema or calf tenderness     Vascular: 2 + Peripheral pulses b/l     Incision Sites: R groin: CDI, no signs of hematoma. Moderate ecchymosis at RUE PICC line Site.     LABS:                        10.8   4.8   )-----------( 122      ( 05 May 2018 07:25 )             31.8       COUMADIN:  No    PT/INR - ( 04 May 2018 10:11 )   PT: 12.0 sec;   INR: 1.08          PTT - ( 04 May 2018 10:11 )  PTT:31.0 sec    05-05    140  |  102  |  15  ----------------------------<  91  4.0   |  26  |  0.94    Ca    8.9      05 May 2018 07:25  Phos  4.1     05-05  Mg     1.9     05-05    TPro  5.9<L>  /  Alb  3.7  /  TBili  0.6  /  DBili  x   /  AST  19  /  ALT  9<L>  /  AlkPhos  38<L>  05-04          MEDICATIONS  (STANDING):  adalimumab Injectable 40 milliGRAM(s) SubCutaneous every 2 weeks  aspirin  chewable 81 milliGRAM(s) Oral daily  atorvastatin 80 milliGRAM(s) Oral at bedtime  docusate sodium 100 milliGRAM(s) Oral three times a day  levothyroxine 88 MICROGram(s) Oral daily  lidocaine   Patch 1 Patch Transdermal every 24 hours  metoprolol tartrate 12.5 milliGRAM(s) Oral every 12 hours  minocycline 100 milliGRAM(s) Oral daily  multivitamin 1 Tablet(s) Oral daily  mupirocin 2% Ointment 1 Application(s) Topical two times a day  pantoprazole    Tablet 40 milliGRAM(s) Oral before breakfast  polyethylene glycol 3350 17 Gram(s) Oral daily  senna 1 Tablet(s) Oral at bedtime  sodium chloride 0.9% lock flush 3 milliLiter(s) IV Push every 8 hours  sodium chloride 0.9% lock flush 20 milliLiter(s) IV Push once      Discharge CXR:  < from: Xray Chest 1 View- PORTABLE-Routine (05.04.18 @ 03:51) >  History: Postop follow-up exam    Similar appearance to prior exam 5/3/2018 with no focal or acute   infiltrate. No pneumothorax.    < end of copied text >      Discharge ECHO:  < from: Echocardiogram (05.04.18 @ 12:22) >  The left atrium is mildly dilated. Right atrial size is normal.Calcified   aortic   valve. There is trace aortic regurgitation. There is Severe aortic   stenosis.   The calculated aortic valve area using thecontinuity equation is 0.8   cm2. The   calculated aortic valve area indexed to body surface area is 0.5 cm2/m2.   The   peak pressure gradient is 57 mmHg. The mean pressure gradient is 35 mmHg.   There is moderate mitral annular calcification. Thereis mild mitral   valve   thickening. There is trace mitral regurgitation. The mean pressure   gradient is   5 mmHg.  Structurally normal tricuspid valve. There is mild tricuspid   regurgitation. The pulmonary artery systolic pressure is estimated to be   37   mmHg.  The pulmonic valve is not well visualized. There is mild pulmonic   regurgitation.  There is a pacemaker wire in the right heart. Probably   normal   right ventricular size and function.  There is a septal "knuckle" with   no left   ventricular outflow obstruction There is mild concentric left ventricular   hypertrophy. The left ventricular wall motion is normal. The left   ventricular   ejection fraction is 57%.  No aortic root dilatation.There is no   pericardial   effusion.Whencompared to prior study performed on 5/2/18, mean pressure   across the aortic valve has improved slightly from 40 mmHg.    < end of copied text > Patient discussed on morning rounds with Dr. Singleton/ Dr. Humphrey    Operation / Date:  BAV 5/3/18    Surgeon: Dr. Singleton     Referring Physician: Not listed    SUBJECTIVE ASSESSMENT:  89y Female seen and examined. Today patient has no acute complaints, she feels well, denies fever, chest pain, palpitations, SOB, abdominal pain, n/v. Is ambulating without assistance, tolerating PO diet, had BM yesterday.     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 Patient was given aspirin and lovenox at [facility] and CT chest was negative for PE. Patient was transferred to [facility] for management of NSTEMI where she was given Brillinta 180mg, TTE showed AS.   SHD team was consulted to surgical evaluation. On 5/3/18 patient underwent BAV with Dr. Singleton. 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 Arrived to ICU with right fem TVP. POD 1 R. fem TVP with sheath removed and humira started. TTE done showed trace AR, Severe AS MARY 0.8cm2, ppg 57mmhg, 35mmhg mpg. Derm consult called for known Seborrheic dermatitis. Today POD2, patient has no acute complaints. AM EKG revealed NSR with QRS 98ms, per Dr. Humphrey metoprolol 12.5mg q12 was started.  Patient is ambulating without assistance, tolerating PO diet, having normal BMs. Per Dr. Humphrey and Dr. Singleton patient ready for discharge.    Per Dr. Singleton patient d/c on asa 81mg daily, no brillinta 2/2 hx GI bleed.      Vital Signs Last 24 Hrs  T(C): 36.7 (05 May 2018 09:24), Max: 37.2 (04 May 2018 13:42)  T(F): 98.1 (05 May 2018 09:24), Max: 99 (04 May 2018 13:42)  HR: 82 (05 May 2018 09:08) (62 - 92)  BP: 118/63 (05 May 2018 09:08) (80/50 - 155/64)  BP(mean): 80 (05 May 2018 09:08) (62 - 96)  RR: 16 (05 May 2018 09:08) (14 - 34)  SpO2: 98% (05 May 2018 09:08) (96% - 99%)  I&O's Detail    04 May 2018 07:01  -  05 May 2018 07:00  --------------------------------------------------------  IN:    Oral Fluid: 380 mL  Total IN: 380 mL    OUT:    Voided: 675 mL  Total OUT: 675 mL    Total NET: -295 mL      05 May 2018 07:01  -  05 May 2018 10:56  --------------------------------------------------------  IN:    Oral Fluid: 240 mL  Total IN: 240 mL    OUT:  Total OUT: 0 mL    Total NET: 240 mL      PHYSICAL EXAM:    General: Patient lying comfortably in bed, no acute distress     Neurological: Alert and oriented. No focal neurological deficits     Cardiovascular: S1S2, RRR, no murmurs appreciated on exam     Respiratory: Clear to ausculation bilaterally, no wheeze/rhonchi/rales     Gastrointestinal: Abdomen soft, non tender, non distended     Extremities: Warm and well perfused. No edema or calf tenderness     Vascular: 2 + Peripheral pulses b/l     Incision Sites: R groin: CDI, no signs of hematoma. Moderate ecchymosis at RUE PICC line Site.     LABS:                        10.8   4.8   )-----------( 122      ( 05 May 2018 07:25 )             31.8       COUMADIN:  No    PT/INR - ( 04 May 2018 10:11 )   PT: 12.0 sec;   INR: 1.08          PTT - ( 04 May 2018 10:11 )  PTT:31.0 sec    05-05    140  |  102  |  15  ----------------------------<  91  4.0   |  26  |  0.94    Ca    8.9      05 May 2018 07:25  Phos  4.1     05-05  Mg     1.9     05-05    TPro  5.9<L>  /  Alb  3.7  /  TBili  0.6  /  DBili  x   /  AST  19  /  ALT  9<L>  /  AlkPhos  38<L>  05-04          MEDICATIONS  (STANDING):  adalimumab Injectable 40 milliGRAM(s) SubCutaneous every 2 weeks  aspirin  chewable 81 milliGRAM(s) Oral daily  atorvastatin 80 milliGRAM(s) Oral at bedtime  docusate sodium 100 milliGRAM(s) Oral three times a day  levothyroxine 88 MICROGram(s) Oral daily  lidocaine   Patch 1 Patch Transdermal every 24 hours  metoprolol tartrate 12.5 milliGRAM(s) Oral every 12 hours  minocycline 100 milliGRAM(s) Oral daily  multivitamin 1 Tablet(s) Oral daily  mupirocin 2% Ointment 1 Application(s) Topical two times a day  pantoprazole    Tablet 40 milliGRAM(s) Oral before breakfast  polyethylene glycol 3350 17 Gram(s) Oral daily  senna 1 Tablet(s) Oral at bedtime  sodium chloride 0.9% lock flush 3 milliLiter(s) IV Push every 8 hours  sodium chloride 0.9% lock flush 20 milliLiter(s) IV Push once      Discharge CXR:  < from: Xray Chest 1 View- PORTABLE-Routine (05.04.18 @ 03:51) >  History: Postop follow-up exam    Similar appearance to prior exam 5/3/2018 with no focal or acute   infiltrate. No pneumothorax.    < end of copied text >      Discharge ECHO:  < from: Echocardiogram (05.04.18 @ 12:22) >  The left atrium is mildly dilated. Right atrial size is normal.Calcified   aortic   valve. There is trace aortic regurgitation. There is Severe aortic   stenosis.   The calculated aortic valve area using thecontinuity equation is 0.8   cm2. The   calculated aortic valve area indexed to body surface area is 0.5 cm2/m2.   The   peak pressure gradient is 57 mmHg. The mean pressure gradient is 35 mmHg.   There is moderate mitral annular calcification. Thereis mild mitral   valve   thickening. There is trace mitral regurgitation. The mean pressure   gradient is   5 mmHg.  Structurally normal tricuspid valve. There is mild tricuspid   regurgitation. The pulmonary artery systolic pressure is estimated to be   37   mmHg.  The pulmonic valve is not well visualized. There is mild pulmonic   regurgitation.  There is a pacemaker wire in the right heart. Probably   normal   right ventricular size and function.  There is a septal "knuckle" with   no left   ventricular outflow obstruction There is mild concentric left ventricular   hypertrophy. The left ventricular wall motion is normal. The left   ventricular   ejection fraction is 57%.  No aortic root dilatation.There is no   pericardial   effusion.Whencompared to prior study performed on 5/2/18, mean pressure   across the aortic valve has improved slightly from 40 mmHg.    < end of copied text >

## 2018-05-05 NOTE — DISCHARGE NOTE ADULT - HOSPITAL COURSE
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 Patient was given aspirin and lovenox at [facility] and CT chest was negative for PE. Patient was transferred to [facility] for management of NSTEMI where she was given Brillinta 180mg, TTE showed AS.   SHD team was consulted to surgical evaluation. On 5/3/18 patient underwent BAV with Dr. Singleton. 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 Arrived to ICU with right fem TVP. POD 1 R. fem TVP with sheath removed and humira started. TTE done showed trace AR, Severe AS MARY 0.8cm2, ppg 57mmhg, 35mmhg mpg. Derm consult called for known Seborrheic dermatitis. Today POD2, patient has no acute complaints. AM EKG revealed NSR with QRS 98ms, per Dr. Humphrey metoprolol 12.5mg q12 was started.  Patient is ambulating without assistance, tolerating PO diet, having normal BMs. Per Dr. Humphrey and Dr. Singleton patient ready for discharge.    Per Dr. Singleton patient d/c on asa 81mg daily, no brillinta 2/2 hx GI bleed.

## 2018-05-05 NOTE — PHYSICAL THERAPY INITIAL EVALUATION ADULT - ADDITIONAL COMMENTS
Pt lives at home senior living facility alone with elevator access. PTA: indep with functional mobility no AD. as per family occasional use of SC in community as needed.

## 2018-05-05 NOTE — DISCHARGE NOTE ADULT - MEDICATION SUMMARY - MEDICATIONS TO STOP TAKING
I will STOP taking the medications listed below when I get home from the hospital:    Metoprolol Succinate ER 25 mg oral tablet, extended release  -- 1 tab(s) by mouth once a day    simvastatin 20 mg oral tablet  -- 1 tab(s) by mouth once a day (at bedtime)

## 2018-05-05 NOTE — DISCHARGE NOTE ADULT - PATIENT PORTAL LINK FT
You can access the [facility] Patient Portal, offered by [facility], by registering with the following website: [email or web address]

## 2018-05-05 NOTE — DISCHARGE NOTE ADULT - NS AS DC PROVIDER CONTACT Y/N MULTI
ED ENT HPI





- General


Chief complaint: Dental/Oral


Stated complaint: TOOTHACHE


Time Seen by Provider: 17 15:32


Source: patient


Mode of arrival: Ambulatory


Limitations: No Limitations





- History of Present Illness


Initial comments: 





PT c/o bad toothache x 2 days.  PT states he tried otc Tylenol and Motrin but 

no relief.  PT states he tooth broke off a while ago.  PT states his pain is 

relieved with heat and worse with cold.  PT has not recently seen a dentist.  


MD complaint: tooth pain


Onset/Timin


-: Gradual, days(s)





  __________________________














  __________________________





 1 - pain





Severity: severe


Severity scale (0 -10): 10


Quality: constant, other (throbbing )


Consistency: constant


Improves with: other (heat )


Worsens with: eating, other (cold )


Context- Dental: history of dental caries, poor dental care


Associated Symptoms: toothache.  denies: fever, gum swelling, pain with 

swallowing, sore throat





- Related Data


 Previous Rx's











 Medication  Instructions  Recorded  Last Taken  Type


 


Acetaminophen/Codeine [Tylenol #3] 1 tab PO Q6H PRN #7 tab 17 Unknown Rx


 


Amoxicillin 500 mg PO BID #20 capsule 17 Unknown Rx


 


Ibuprofen [Motrin] 600 mg PO Q8H PRN #20 tablet 17 Unknown Rx











 Allergies











Allergy/AdvReac Type Severity Reaction Status Date / Time


 


No Known Allergies Allergy   Verified 17 15:27














ED Dental HPI





- General


Chief complaint: Dental/Oral


Stated complaint: TOOTHACHE


Time Seen by Provider: 17 15:32


Source: patient


Mode of arrival: Ambulatory


Limitations: No Limitations





- Related Data


 Previous Rx's











 Medication  Instructions  Recorded  Last Taken  Type


 


Acetaminophen/Codeine [Tylenol #3] 1 tab PO Q6H PRN #7 tab 17 Unknown Rx


 


Amoxicillin 500 mg PO BID #20 capsule 17 Unknown Rx


 


Ibuprofen [Motrin] 600 mg PO Q8H PRN #20 tablet 17 Unknown Rx











 Allergies











Allergy/AdvReac Type Severity Reaction Status Date / Time


 


No Known Allergies Allergy   Verified 17 15:27














ED Review of Systems


ROS: 


Stated complaint: TOOTHACHE


Other details as noted in HPI





Comment: All other systems reviewed and negative


Constitutional: denies: fever


ENT: as per HPI, dental pain


Gastrointestinal: denies: abdominal pain, nausea, vomiting


Skin: denies: rash, change in color





ED Past Medical Hx





- Past Medical History


Hx Hypertension: Yes


Hx Asthma: Yes


Additional medical history: HEART MURMUR





- Surgical History


Past Surgical History?: No


Additional Surgical History: head surgery?





- Social History


Smoking Status: Current Every Day Smoker


Substance Use Type: None





- Medications


Home Medications: 


 Home Medications











 Medication  Instructions  Recorded  Confirmed  Last Taken  Type


 


Acetaminophen/Codeine [Tylenol #3] 1 tab PO Q6H PRN #7 tab 17  Unknown Rx


 


Amoxicillin 500 mg PO BID #20 capsule 17  Unknown Rx


 


Ibuprofen [Motrin] 600 mg PO Q8H PRN #20 tablet 17  Unknown Rx














ED Physical Exam





- General


Limitations: No Limitations, Language Barrier


General appearance: alert, in no apparent distress, other (thin )





- Head


Head exam: Present: atraumatic, normocephalic, normal inspection, other (no 

facial swelling )





- Eye


Eye exam: Present: normal appearance, PERRL, EOMI.  Absent: conjunctival 

injection





- ENT


ENT exam: Present: normal orophraynx, mucous membranes moist, normal external 

ear exam





- Expanded ENT Exam


  ** Expanded


Mouth exam: Present: normal external inspection.  Absent: drooling, trismus


Teeth exam: Present: dental caries, dental tenderness #.  Absent: gingival 

enlargement





  __________________________














  __________________________





 1 - Dental Tenderness








- Neck


Neck exam: Present: normal inspection, full ROM, lymphadenopathy.  Absent: 

tenderness





- Respiratory


Respiratory exam: Present: normal lung sounds bilaterally.  Absent: respiratory 

distress, wheezes, rales, rhonchi, chest wall tenderness





- Cardiovascular


Cardiovascular Exam: Present: normal rhythm, bradycardia





- GI/Abdominal


GI/Abdominal exam: Present: soft.  Absent: tenderness





- Extremities Exam


Extremities exam: Present: normal inspection, full ROM





- Back Exam


Back exam: Present: normal inspection, full ROM.  Absent: tenderness, CVA 

tenderness (R), CVA tenderness (L)





- Neurological Exam


Neurological exam: Present: alert, oriented X3, normal gait





- Psychiatric


Psychiatric exam: Present: normal affect, normal mood





- Skin


Skin exam: Present: warm, dry, intact





ED Course


 Vital Signs











  17





  15:27 16:13


 


Temperature 97.3 F L 97.8 F


 


Pulse Rate 50 L 56 L


 


Respiratory 17 18





Rate  


 


Blood Pressure 133/86 


 


Blood Pressure  142/90





[Right]  


 


O2 Sat by Pulse 100 100





Oximetry  














- Reevaluation(s)


Reevaluation #1: 





17 15:53


PT aware of dx and need to follow up with Dentist.  PT has no questions at this 

time. 





- Pulse Oximetry Interpretation


  ** Digit-Finger


Initial Pulse Oximetry Readin


Actions Taken: none





ED Medical Decision Making





- Differential Diagnosis


toothache, dental abscess 


Critical Care Time: No


Critical care attestation.: 


If time is entered above; I have spent that time in minutes in the direct care 

of this critically ill patient, excluding procedure time.








ED Disposition


Clinical Impression: 


 Toothache, Dental decay





Disposition: DC- TO HOME OR SELFCARE


Is pt being admited?: No


Does the pt Need Aspirin: No


Condition: Stable


Instructions:  Dental Caries (ED), Toothache (ED)


Additional Instructions: 


Follow up with Dentist in 3-5 days


No driving or Alcohol after taking Tylenol #3 for pain 


good oral hygiene


Return to ED if worsening or concerns 


Prescriptions: 


Acetaminophen/Codeine [Tylenol #3] 1 tab PO Q6H PRN #7 tab


 PRN Reason: Pain , Severe (7-10)


Amoxicillin 500 mg PO BID #20 capsule


Ibuprofen [Motrin] 600 mg PO Q8H PRN #20 tablet


 PRN Reason: Pain


Referrals: 


[facility] [Outside] - 3-5 Days


[facility] [Outside] - 3-5 Days


MODE MCCONNELL MD, PHD [Staff Physician] - 3-5 Days


Forms:  Work/School Release Form(ED)


Time of Disposition: 15:55
Yes

## 2018-05-05 NOTE — PROGRESS NOTE ADULT - SUBJECTIVE AND OBJECTIVE BOX
89 y/o F w/PMH of CAD s/p MAIA x3 in 2012 c/b GI bleed while on Plavix, AS, palpitations s/p loop recorder, Crohn's c/b fistula and MRSA infection s/p partial bowel resection, HTN, and hypothyroidism presented to [facility] ED c/o acute onset chest pain. Hx supplemented by son, who was at bedside. Pt was flying back to [place] from [place], when she reported mild CP that started at base of lungs and progressed to center of chest. CP radiated to back and was associated w/nausea that lasted about 30 minutes. Pt was put on 2L NC and PIV placed in flight. Pt noted that her sx resolved w/oxygen, IVF, and aspirin 250mg. Pt denied SOB, productive cough, palpitations, recent illness, hx blood clots, f/c/v/d, abdominal pain, urinary symptoms, change in medications, HA, dizziness, changes in vision.    In  ED, T 98.4, HR 68, /84, RR 18, O2 96% on RA. Labs s/f D-dimer 461, trop I 0.293. EKG s/f NSR. CTA chest neg for PE. Pt given ASA 81mg and Lovenox 60mg.    Upon arrival to CCU, VS s/f /88. Pt given Lopressor 25mg PO with improvement of SBP to 150s. Anticoagulation and indication for PCI was discussed with pt and family, who was concerned about hx of GI bleed on Plavix; however, pt and family would like to proceed with alternative A/C medication despite risks of bleeding. Pt was Brilinta loaded with plan for hep gtt and cardiac cath in AM.    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    Past Surgical History:  H/O total hysterectomy    History of bowel resection    S/P drug eluting coronary stent placement.    Pt.S&E@BS in AM on [address]                	  MEDICATIONS:  metoprolol tartrate 12.5 milliGRAM(s) Oral every 12 hours    minocycline 100 milliGRAM(s) Oral daily        bisacodyl Suppository 10 milliGRAM(s) Rectal daily PRN  docusate sodium 100 milliGRAM(s) Oral three times a day  pantoprazole    Tablet 40 milliGRAM(s) Oral before breakfast  polyethylene glycol 3350 17 Gram(s) Oral daily  senna 1 Tablet(s) Oral at bedtime    atorvastatin 80 milliGRAM(s) Oral at bedtime  levothyroxine 88 MICROGram(s) Oral daily    adalimumab Injectable 40 milliGRAM(s) SubCutaneous every 2 weeks  aspirin  chewable 81 milliGRAM(s) Oral daily  lidocaine   Patch 1 Patch Transdermal every 24 hours  multivitamin 1 Tablet(s) Oral daily  mupirocin 2% Ointment 1 Application(s) Topical two times a day  sodium chloride 0.9% lock flush 3 milliLiter(s) IV Push every 8 hours  sodium chloride 0.9% lock flush 10 milliLiter(s) IV Push every 1 hour PRN  sodium chloride 0.9% lock flush 10 milliLiter(s) IV Push every 12 hours PRN  sodium chloride 0.9% lock flush 20 milliLiter(s) IV Push once      Complaint:     Otherwise 12 point review of systems is normal.    PHYSICAL EXAM:    Constitutional: NAD  Eyes: PERRL, EOMI, sclera non-icteric  Neck: supple, no masses, no JVD  Respiratory: CTA b/l, good air entry b/l, no wheezing, rhonchi, rales, with normal respiratory effort and no intercostal retractions  Cardiovascular:  normal S1S2, no M/R/G  Gastrointestinal: soft, NTND, no masses palpable, BS +  Extremities:  no c/c/e  Neurological: AAOx3    ICU Vital Signs Last 24 Hrs  T(C): 36.8 (05 May 2018 13:55), Max: 36.8 (05 May 2018 13:55)  T(F): 98.3 (05 May 2018 13:55), Max: 98.3 (05 May 2018 13:55)  HR: 76 (05 May 2018 14:40) (62 - 82)  BP: 166/59 (05 May 2018 14:40) (118/63 - 166/59)  BP(mean): 80 (05 May 2018 09:08) (79 - 94)  ABP: --  ABP(mean): --  RR: 16 (05 May 2018 09:08) (14 - 20)  SpO2: 98% (05 May 2018 14:40) (98% - 99%)        ECG:      CHEST X RAY    CT    MRI    MRA    CT ANGIO    CAROTID DUPLEX    DUPLEX     Echocardiogram    Catheterization:    Stress Test:     LABS:	 	  CARDIAC MARKERS:  Troponin T, Serum: <0.01 ng/mL [0.00 - 0.01] (05-02 @ 23:30)                              10.8   4.8   )-----------( 122      ( 05 May 2018 07:25 )             31.8     05-05    140  |  102  |  15  ----------------------------<  91  4.0   |  26  |  0.94    Ca    8.9      05 May 2018 07:25  Phos  4.1     05-05  Mg     1.9     05-05    TPro  5.9<L>  /  Alb  3.7  /  TBili  0.6  /  DBili  x   /  AST  19  /  ALT  9<L>  /  AlkPhos  38<L>  05-04        ASSESSMENT/PLAN: 	  89 year old female with a PMH of CAD s/p MAIA x3 in 2012 c/b GI bleed while on Plavix, AS, palpitations s/p loop recorder, 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 Patient was given aspirin and lovenox at [facility] and CT chest was negative for PE. Patient was transferred to [facility] for management of NSTEMI where she was given Brillinta 180mg, TTE showed AS.   SHD team was consulted to surgical evaluation. On 5/3/18 patient underwent BAV with Dr. Singleton. 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 Arrived to ICU with right fem TVP. POD 1 R. fem TVP with sheath removed and humira started. TTE done showed trace AR, Severe AS MARY 0.8cm2, ppg 57mmhg, 35mmhg mpg. Derm consult called for known Seborrheic dermatitis. Today POD2, patient has no acute complaints. AM EKG revealed NSR with QRS 98ms, per Dr. Humphrey metoprolol 12.5mg q12 was started.  Patient is ambulating without assistance, tolerating PO diet, having normal BMs.

## 2018-05-05 NOTE — CHART NOTE - NSCHARTNOTEFT_GEN_A_CORE
Per Dr. Humphrey, Midline to be removed from RUE. MIdline removed, 20cm confirmed, manual pressure held for 5minutes, and DSD dressing applied. Patient tolerated procedure well.

## 2018-05-09 ENCOUNTER — RX RENEWAL (OUTPATIENT)
Age: 83
End: 2018-05-09

## 2018-05-10 DIAGNOSIS — M17.12 UNILATERAL PRIMARY OSTEOARTHRITIS, LEFT KNEE: ICD-10-CM

## 2018-05-10 DIAGNOSIS — I45.10 UNSPECIFIED RIGHT BUNDLE-BRANCH BLOCK: ICD-10-CM

## 2018-05-10 DIAGNOSIS — K59.09 OTHER CONSTIPATION: ICD-10-CM

## 2018-05-10 DIAGNOSIS — Z95.5 PRESENCE OF CORONARY ANGIOPLASTY IMPLANT AND GRAFT: ICD-10-CM

## 2018-05-10 DIAGNOSIS — I25.84 CORONARY ATHEROSCLEROSIS DUE TO CALCIFIED CORONARY LESION: ICD-10-CM

## 2018-05-10 DIAGNOSIS — K50.913 CROHN'S DISEASE, UNSPECIFIED, WITH FISTULA: ICD-10-CM

## 2018-05-10 DIAGNOSIS — Z90.12 ACQUIRED ABSENCE OF LEFT BREAST AND NIPPLE: ICD-10-CM

## 2018-05-10 DIAGNOSIS — I13.0 HYPERTENSIVE HEART AND CHRONIC KIDNEY DISEASE WITH HEART FAILURE AND STAGE 1 THROUGH STAGE 4 CHRONIC KIDNEY DISEASE, OR UNSPECIFIED CHRONIC KIDNEY DISEASE: ICD-10-CM

## 2018-05-10 DIAGNOSIS — E03.9 HYPOTHYROIDISM, UNSPECIFIED: ICD-10-CM

## 2018-05-10 DIAGNOSIS — I21.4 NON-ST ELEVATION (NSTEMI) MYOCARDIAL INFARCTION: ICD-10-CM

## 2018-05-10 DIAGNOSIS — I25.10 ATHEROSCLEROTIC HEART DISEASE OF NATIVE CORONARY ARTERY WITHOUT ANGINA PECTORIS: ICD-10-CM

## 2018-05-10 DIAGNOSIS — L76.32 POSTPROCEDURAL HEMATOMA OF SKIN AND SUBCUTANEOUS TISSUE FOLLOWING OTHER PROCEDURE: ICD-10-CM

## 2018-05-10 DIAGNOSIS — Z90.710 ACQUIRED ABSENCE OF BOTH CERVIX AND UTERUS: ICD-10-CM
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
## 2018-05-10 DIAGNOSIS — I35.0 NONRHEUMATIC AORTIC (VALVE) STENOSIS: ICD-10-CM
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
## 2018-05-10 DIAGNOSIS — L21.9 SEBORRHEIC DERMATITIS, UNSPECIFIED: ICD-10-CM

## 2018-05-10 DIAGNOSIS — I50.33 ACUTE ON CHRONIC DIASTOLIC (CONGESTIVE) HEART FAILURE: ICD-10-CM

## 2018-05-10 DIAGNOSIS — I44.7 LEFT BUNDLE-BRANCH BLOCK, UNSPECIFIED: ICD-10-CM

## 2018-05-10 DIAGNOSIS — N18.9 CHRONIC KIDNEY DISEASE, UNSPECIFIED: ICD-10-CM

## 2018-05-10 DIAGNOSIS — Z85.3 PERSONAL HISTORY OF MALIGNANT NEOPLASM OF BREAST: ICD-10-CM

## 2018-05-10 DIAGNOSIS — Z22.322 CARRIER OR SUSPECTED CARRIER OF METHICILLIN RESISTANT STAPHYLOCOCCUS AUREUS: ICD-10-CM

## 2018-05-10 DIAGNOSIS — F41.9 ANXIETY DISORDER, UNSPECIFIED: ICD-10-CM

## 2018-05-14 ENCOUNTER — APPOINTMENT (OUTPATIENT)
Dept: CARDIOTHORACIC SURGERY | Facility: CLINIC | Age: 83
End: 2018-05-14
Payer: MEDICARE

## 2018-05-14 VITALS
RESPIRATION RATE: 18 BRPM | SYSTOLIC BLOOD PRESSURE: 164 MMHG | TEMPERATURE: 98 F | OXYGEN SATURATION: 99 % | DIASTOLIC BLOOD PRESSURE: 74 MMHG | WEIGHT: 131 LBS | HEART RATE: 71 BPM

## 2018-05-14 VITALS — HEIGHT: 59 IN | BODY MASS INDEX: 26.46 KG/M2

## 2018-05-14 DIAGNOSIS — Z86.39 PERSONAL HISTORY OF OTHER ENDOCRINE, NUTRITIONAL AND METABOLIC DISEASE: ICD-10-CM

## 2018-05-14 DIAGNOSIS — Z87.19 PERSONAL HISTORY OF OTHER DISEASES OF THE DIGESTIVE SYSTEM: ICD-10-CM

## 2018-05-14 DIAGNOSIS — K63.2 FISTULA OF INTESTINE: ICD-10-CM

## 2018-05-14 DIAGNOSIS — K50.90 CROHN'S DISEASE, UNSPECIFIED, W/OUT COMPLICATIONS: ICD-10-CM

## 2018-05-14 PROCEDURE — 99024 POSTOP FOLLOW-UP VISIT: CPT

## 2018-05-15 PROBLEM — K50.90 CROHN DISEASE: Status: ACTIVE | Noted: 2018-05-15

## 2018-05-15 PROBLEM — K63.2 ENTEROCUTANEOUS FISTULA: Status: RESOLVED | Noted: 2018-05-15 | Resolved: 2018-05-15

## 2018-05-15 PROBLEM — Z87.19 HISTORY OF GASTROINTESTINAL HEMORRHAGE: Status: RESOLVED | Noted: 2018-05-15 | Resolved: 2018-05-15

## 2018-05-15 PROBLEM — Z86.39 HISTORY OF HYPOTHYROIDISM: Status: RESOLVED | Noted: 2018-05-15 | Resolved: 2018-05-15

## 2018-05-22 ENCOUNTER — APPOINTMENT (OUTPATIENT)
Dept: HEART AND VASCULAR | Facility: CLINIC | Age: 83
End: 2018-05-22
Payer: MEDICARE

## 2018-05-22 VITALS
SYSTOLIC BLOOD PRESSURE: 146 MMHG | HEIGHT: 60 IN | BODY MASS INDEX: 26.11 KG/M2 | TEMPERATURE: 98.3 F | HEART RATE: 69 BPM | WEIGHT: 133 LBS | OXYGEN SATURATION: 95 % | DIASTOLIC BLOOD PRESSURE: 72 MMHG

## 2018-05-22 DIAGNOSIS — I35.0 NONRHEUMATIC AORTIC (VALVE) STENOSIS: ICD-10-CM

## 2018-05-22 DIAGNOSIS — R60.0 LOCALIZED EDEMA: ICD-10-CM

## 2018-05-22 DIAGNOSIS — Z98.61 ATHEROSCLEROTIC HEART DISEASE OF NATIVE CORONARY ARTERY W/OUT ANGINA PECTORIS: ICD-10-CM

## 2018-05-22 DIAGNOSIS — K21.9 GASTRO-ESOPHAGEAL REFLUX DISEASE W/OUT ESOPHAGITIS: ICD-10-CM

## 2018-05-22 DIAGNOSIS — I50.32 CHRONIC DIASTOLIC (CONGESTIVE) HEART FAILURE: ICD-10-CM

## 2018-05-22 DIAGNOSIS — I10 ESSENTIAL (PRIMARY) HYPERTENSION: ICD-10-CM

## 2018-05-22 DIAGNOSIS — A49.02 METHICILLIN RESISTANT STAPHYLOCOCCUS AUREUS INFECTION, UNSPECIFIED SITE: ICD-10-CM

## 2018-05-22 DIAGNOSIS — I25.10 ATHEROSCLEROTIC HEART DISEASE OF NATIVE CORONARY ARTERY W/OUT ANGINA PECTORIS: ICD-10-CM

## 2018-05-22 DIAGNOSIS — E07.9 DISORDER OF THYROID, UNSPECIFIED: ICD-10-CM

## 2018-05-22 PROCEDURE — 99214 OFFICE O/P EST MOD 30 MIN: CPT | Mod: 25

## 2018-05-22 PROCEDURE — 93000 ELECTROCARDIOGRAM COMPLETE: CPT

## 2018-05-22 RX ORDER — ADALIMUMAB 40MG/0.8ML
40 KIT SUBCUTANEOUS
Refills: 0 | Status: ACTIVE | COMMUNITY

## 2018-05-22 RX ORDER — LEVOTHYROXINE SODIUM 0.09 MG/1
88 TABLET ORAL DAILY
Refills: 0 | Status: ACTIVE | COMMUNITY

## 2018-05-22 RX ORDER — MINOCYCLINE HYDROCHLORIDE 100 MG/1
100 TABLET ORAL
Refills: 0 | Status: ACTIVE | COMMUNITY

## 2018-05-22 RX ORDER — FUROSEMIDE 20 MG/1
20 TABLET ORAL
Qty: 30 | Refills: 0 | Status: ACTIVE | COMMUNITY

## 2018-05-22 RX ORDER — ASPIRIN 81 MG
81 TABLET, DELAYED RELEASE (ENTERIC COATED) ORAL DAILY
Qty: 30 | Refills: 0 | Status: ACTIVE | COMMUNITY

## 2018-05-22 RX ORDER — OMEPRAZOLE 20 MG/1
20 CAPSULE, DELAYED RELEASE ORAL DAILY
Qty: 30 | Refills: 2 | Status: ACTIVE | COMMUNITY

## 2018-06-11 ENCOUNTER — APPOINTMENT (OUTPATIENT)
Dept: CARDIOTHORACIC SURGERY | Facility: CLINIC | Age: 83
End: 2018-06-11
Payer: MEDICARE

## 2018-06-11 ENCOUNTER — OUTPATIENT (OUTPATIENT)
Dept: OUTPATIENT SERVICES | Facility: HOSPITAL | Age: 83
LOS: 1 days | End: 2018-06-11
Payer: MEDICARE

## 2018-06-11 VITALS
BODY MASS INDEX: 26.76 KG/M2 | DIASTOLIC BLOOD PRESSURE: 72 MMHG | TEMPERATURE: 97.3 F | SYSTOLIC BLOOD PRESSURE: 161 MMHG | OXYGEN SATURATION: 99 % | RESPIRATION RATE: 18 BRPM | HEART RATE: 64 BPM | WEIGHT: 137 LBS

## 2018-06-11 DIAGNOSIS — Z95.5 PRESENCE OF CORONARY ANGIOPLASTY IMPLANT AND GRAFT: Chronic | ICD-10-CM

## 2018-06-11 DIAGNOSIS — Z98.890 OTHER SPECIFIED POSTPROCEDURAL STATES: Chronic | ICD-10-CM

## 2018-06-11 DIAGNOSIS — Z90.710 ACQUIRED ABSENCE OF BOTH CERVIX AND UTERUS: Chronic | ICD-10-CM

## 2018-06-11 LAB
ALBUMIN SERPL ELPH-MCNC: 4 G/DL — SIGNIFICANT CHANGE UP (ref 3.3–5)
ALP SERPL-CCNC: 50 U/L — SIGNIFICANT CHANGE UP (ref 40–120)
ALT FLD-CCNC: 14 U/L — SIGNIFICANT CHANGE UP (ref 10–45)
ANION GAP SERPL CALC-SCNC: 13 MMOL/L — SIGNIFICANT CHANGE UP (ref 5–17)
APTT BLD: 33.5 SEC — SIGNIFICANT CHANGE UP (ref 27.5–37.4)
AST SERPL-CCNC: 24 U/L — SIGNIFICANT CHANGE UP (ref 10–40)
BASOPHILS NFR BLD AUTO: 0.5 % — SIGNIFICANT CHANGE UP (ref 0–2)
BILIRUB SERPL-MCNC: 0.5 MG/DL — SIGNIFICANT CHANGE UP (ref 0.2–1.2)
BUN SERPL-MCNC: 28 MG/DL — HIGH (ref 7–23)
CALCIUM SERPL-MCNC: 9.1 MG/DL — SIGNIFICANT CHANGE UP (ref 8.4–10.5)
CHLORIDE SERPL-SCNC: 104 MMOL/L — SIGNIFICANT CHANGE UP (ref 96–108)
CO2 SERPL-SCNC: 27 MMOL/L — SIGNIFICANT CHANGE UP (ref 22–31)
CREAT SERPL-MCNC: 0.95 MG/DL — SIGNIFICANT CHANGE UP (ref 0.5–1.3)
EOSINOPHIL NFR BLD AUTO: 0.9 % — SIGNIFICANT CHANGE UP (ref 0–6)
GLUCOSE SERPL-MCNC: 62 MG/DL — LOW (ref 70–99)
HCT VFR BLD CALC: 33.8 % — LOW (ref 34.5–45)
HGB BLD-MCNC: 11.1 G/DL — LOW (ref 11.5–15.5)
INR BLD: 0.93 — SIGNIFICANT CHANGE UP (ref 0.88–1.16)
LYMPHOCYTES # BLD AUTO: 26.6 % — SIGNIFICANT CHANGE UP (ref 13–44)
MCHC RBC-ENTMCNC: 30.6 PG — SIGNIFICANT CHANGE UP (ref 27–34)
MCHC RBC-ENTMCNC: 32.8 G/DL — SIGNIFICANT CHANGE UP (ref 32–36)
MCV RBC AUTO: 93.1 FL — SIGNIFICANT CHANGE UP (ref 80–100)
MONOCYTES NFR BLD AUTO: 11.4 % — SIGNIFICANT CHANGE UP (ref 2–14)
NEUTROPHILS NFR BLD AUTO: 60.6 % — SIGNIFICANT CHANGE UP (ref 43–77)
NT-PROBNP SERPL-SCNC: 2126 PG/ML — HIGH (ref 0–300)
PLATELET # BLD AUTO: 142 K/UL — LOW (ref 150–400)
POTASSIUM SERPL-MCNC: 4.1 MMOL/L — SIGNIFICANT CHANGE UP (ref 3.5–5.3)
POTASSIUM SERPL-SCNC: 4.1 MMOL/L — SIGNIFICANT CHANGE UP (ref 3.5–5.3)
PROT SERPL-MCNC: 7.2 G/DL — SIGNIFICANT CHANGE UP (ref 6–8.3)
PROTHROM AB SERPL-ACNC: 10.3 SEC — SIGNIFICANT CHANGE UP (ref 9.8–12.7)
RBC # BLD: 3.63 M/UL — LOW (ref 3.8–5.2)
RBC # FLD: 14.8 % — SIGNIFICANT CHANGE UP (ref 10.3–16.9)
SODIUM SERPL-SCNC: 144 MMOL/L — SIGNIFICANT CHANGE UP (ref 135–145)
WBC # BLD: 4.4 K/UL — SIGNIFICANT CHANGE UP (ref 3.8–10.5)
WBC # FLD AUTO: 4.4 K/UL — SIGNIFICANT CHANGE UP (ref 3.8–10.5)

## 2018-06-11 PROCEDURE — C1769: CPT

## 2018-06-11 PROCEDURE — 36415 COLL VENOUS BLD VENIPUNCTURE: CPT

## 2018-06-11 PROCEDURE — 85025 COMPLETE CBC W/AUTO DIFF WBC: CPT

## 2018-06-11 PROCEDURE — 85730 THROMBOPLASTIN TIME PARTIAL: CPT

## 2018-06-11 PROCEDURE — 82330 ASSAY OF CALCIUM: CPT

## 2018-06-11 PROCEDURE — 93005 ELECTROCARDIOGRAM TRACING: CPT

## 2018-06-11 PROCEDURE — 85610 PROTHROMBIN TIME: CPT

## 2018-06-11 PROCEDURE — 84484 ASSAY OF TROPONIN QUANT: CPT

## 2018-06-11 PROCEDURE — 86901 BLOOD TYPING SEROLOGIC RH(D): CPT

## 2018-06-11 PROCEDURE — 97161 PT EVAL LOW COMPLEX 20 MIN: CPT

## 2018-06-11 PROCEDURE — 83605 ASSAY OF LACTIC ACID: CPT

## 2018-06-11 PROCEDURE — 36569 INSJ PICC 5 YR+ W/O IMAGING: CPT

## 2018-06-11 PROCEDURE — 82962 GLUCOSE BLOOD TEST: CPT

## 2018-06-11 PROCEDURE — 83880 ASSAY OF NATRIURETIC PEPTIDE: CPT

## 2018-06-11 PROCEDURE — 86850 RBC ANTIBODY SCREEN: CPT

## 2018-06-11 PROCEDURE — 80061 LIPID PANEL: CPT

## 2018-06-11 PROCEDURE — 84132 ASSAY OF SERUM POTASSIUM: CPT

## 2018-06-11 PROCEDURE — 86900 BLOOD TYPING SEROLOGIC ABO: CPT

## 2018-06-11 PROCEDURE — 93880 EXTRACRANIAL BILAT STUDY: CPT

## 2018-06-11 PROCEDURE — 82803 BLOOD GASES ANY COMBINATION: CPT

## 2018-06-11 PROCEDURE — 82553 CREATINE MB FRACTION: CPT

## 2018-06-11 PROCEDURE — 83036 HEMOGLOBIN GLYCOSYLATED A1C: CPT

## 2018-06-11 PROCEDURE — 85027 COMPLETE CBC AUTOMATED: CPT

## 2018-06-11 PROCEDURE — 80053 COMPREHEN METABOLIC PANEL: CPT

## 2018-06-11 PROCEDURE — 84443 ASSAY THYROID STIM HORMONE: CPT

## 2018-06-11 PROCEDURE — 70450 CT HEAD/BRAIN W/O DYE: CPT

## 2018-06-11 PROCEDURE — 86923 COMPATIBILITY TEST ELECTRIC: CPT

## 2018-06-11 PROCEDURE — 75573 CT HRT C+ STRUX CGEN HRT DS: CPT

## 2018-06-11 PROCEDURE — 82550 ASSAY OF CK (CPK): CPT

## 2018-06-11 PROCEDURE — 80048 BASIC METABOLIC PNL TOTAL CA: CPT

## 2018-06-11 PROCEDURE — 74174 CTA ABD&PLVS W/CONTRAST: CPT

## 2018-06-11 PROCEDURE — C1894: CPT

## 2018-06-11 PROCEDURE — 81001 URINALYSIS AUTO W/SCOPE: CPT

## 2018-06-11 PROCEDURE — 84295 ASSAY OF SERUM SODIUM: CPT

## 2018-06-11 PROCEDURE — 94150 VITAL CAPACITY TEST: CPT

## 2018-06-11 PROCEDURE — 83735 ASSAY OF MAGNESIUM: CPT

## 2018-06-11 PROCEDURE — 84100 ASSAY OF PHOSPHORUS: CPT

## 2018-06-11 PROCEDURE — 84439 ASSAY OF FREE THYROXINE: CPT

## 2018-06-11 PROCEDURE — C1726: CPT

## 2018-06-11 PROCEDURE — 93306 TTE W/DOPPLER COMPLETE: CPT

## 2018-06-11 PROCEDURE — 99214 OFFICE O/P EST MOD 30 MIN: CPT | Mod: 25

## 2018-06-11 PROCEDURE — C1751: CPT

## 2018-06-11 PROCEDURE — P9045: CPT

## 2018-06-11 PROCEDURE — 71045 X-RAY EXAM CHEST 1 VIEW: CPT

## 2018-06-11 PROCEDURE — C1760: CPT

## 2018-06-11 PROCEDURE — C1887: CPT

## 2018-06-11 PROCEDURE — C1889: CPT

## 2018-06-11 PROCEDURE — 76937 US GUIDE VASCULAR ACCESS: CPT

## 2018-06-12 DIAGNOSIS — I35.0 NONRHEUMATIC AORTIC (VALVE) STENOSIS: ICD-10-CM

## 2018-06-21 ENCOUNTER — INPATIENT (INPATIENT)
Facility: HOSPITAL | Age: 83
LOS: 1 days | Discharge: HOME | DRG: 267 | End: 2018-06-23
Attending: INTERNAL MEDICINE | Admitting: INTERNAL MEDICINE
Payer: MEDICARE

## 2018-06-21 ENCOUNTER — APPOINTMENT (OUTPATIENT)
Dept: CARDIOTHORACIC SURGERY | Facility: HOSPITAL | Age: 83
End: 2018-06-21
Payer: MEDICARE

## 2018-06-21 VITALS
SYSTOLIC BLOOD PRESSURE: 165 MMHG | HEIGHT: 60 IN | OXYGEN SATURATION: 99 % | HEART RATE: 61 BPM | DIASTOLIC BLOOD PRESSURE: 72 MMHG | WEIGHT: 130.95 LBS | TEMPERATURE: 98 F | RESPIRATION RATE: 14 BRPM

## 2018-06-21 DIAGNOSIS — Z98.890 OTHER SPECIFIED POSTPROCEDURAL STATES: Chronic | ICD-10-CM

## 2018-06-21 DIAGNOSIS — I35.0 NONRHEUMATIC AORTIC (VALVE) STENOSIS: ICD-10-CM

## 2018-06-21 DIAGNOSIS — Z90.710 ACQUIRED ABSENCE OF BOTH CERVIX AND UTERUS: Chronic | ICD-10-CM

## 2018-06-21 DIAGNOSIS — Z95.5 PRESENCE OF CORONARY ANGIOPLASTY IMPLANT AND GRAFT: Chronic | ICD-10-CM

## 2018-06-21 DIAGNOSIS — Z00.6 ENCOUNTER FOR EXAMINATION FOR NORMAL COMPARISON AND CONTROL IN CLINICAL RESEARCH PROGRAM: ICD-10-CM

## 2018-06-21 LAB
ALBUMIN SERPL ELPH-MCNC: 3.7 G/DL — SIGNIFICANT CHANGE UP (ref 3.3–5)
ALBUMIN SERPL ELPH-MCNC: 3.7 G/DL — SIGNIFICANT CHANGE UP (ref 3.3–5)
ALP SERPL-CCNC: 47 U/L — SIGNIFICANT CHANGE UP (ref 40–120)
ALP SERPL-CCNC: 50 U/L — SIGNIFICANT CHANGE UP (ref 40–120)
ALT FLD-CCNC: 15 U/L — SIGNIFICANT CHANGE UP (ref 10–45)
ALT FLD-CCNC: 16 U/L — SIGNIFICANT CHANGE UP (ref 10–45)
ANION GAP SERPL CALC-SCNC: 11 MMOL/L — SIGNIFICANT CHANGE UP (ref 5–17)
ANION GAP SERPL CALC-SCNC: 11 MMOL/L — SIGNIFICANT CHANGE UP (ref 5–17)
APTT BLD: 28.5 SEC — SIGNIFICANT CHANGE UP (ref 27.5–37.4)
APTT BLD: 37.9 SEC — HIGH (ref 27.5–37.4)
AST SERPL-CCNC: 23 U/L — SIGNIFICANT CHANGE UP (ref 10–40)
AST SERPL-CCNC: 26 U/L — SIGNIFICANT CHANGE UP (ref 10–40)
BASOPHILS NFR BLD AUTO: 0.2 % — SIGNIFICANT CHANGE UP (ref 0–2)
BILIRUB SERPL-MCNC: 0.7 MG/DL — SIGNIFICANT CHANGE UP (ref 0.2–1.2)
BILIRUB SERPL-MCNC: 0.7 MG/DL — SIGNIFICANT CHANGE UP (ref 0.2–1.2)
BLD GP AB SCN SERPL QL: NEGATIVE — SIGNIFICANT CHANGE UP
BUN SERPL-MCNC: 20 MG/DL — SIGNIFICANT CHANGE UP (ref 7–23)
BUN SERPL-MCNC: 25 MG/DL — HIGH (ref 7–23)
CALCIUM SERPL-MCNC: 9.2 MG/DL — SIGNIFICANT CHANGE UP (ref 8.4–10.5)
CALCIUM SERPL-MCNC: 9.2 MG/DL — SIGNIFICANT CHANGE UP (ref 8.4–10.5)
CHLORIDE SERPL-SCNC: 102 MMOL/L — SIGNIFICANT CHANGE UP (ref 96–108)
CHLORIDE SERPL-SCNC: 104 MMOL/L — SIGNIFICANT CHANGE UP (ref 96–108)
CO2 SERPL-SCNC: 27 MMOL/L — SIGNIFICANT CHANGE UP (ref 22–31)
CO2 SERPL-SCNC: 28 MMOL/L — SIGNIFICANT CHANGE UP (ref 22–31)
CREAT SERPL-MCNC: 0.86 MG/DL — SIGNIFICANT CHANGE UP (ref 0.5–1.3)
CREAT SERPL-MCNC: 0.98 MG/DL — SIGNIFICANT CHANGE UP (ref 0.5–1.3)
EOSINOPHIL NFR BLD AUTO: 0.3 % — SIGNIFICANT CHANGE UP (ref 0–6)
GAS PNL BLDA: SIGNIFICANT CHANGE UP
GLUCOSE SERPL-MCNC: 101 MG/DL — HIGH (ref 70–99)
GLUCOSE SERPL-MCNC: 88 MG/DL — SIGNIFICANT CHANGE UP (ref 70–99)
HCT VFR BLD CALC: 33.7 % — LOW (ref 34.5–45)
HCT VFR BLD CALC: 34 % — LOW (ref 34.5–45)
HGB BLD-MCNC: 10.7 G/DL — LOW (ref 11.5–15.5)
HGB BLD-MCNC: 11 G/DL — LOW (ref 11.5–15.5)
INR BLD: 0.96 — SIGNIFICANT CHANGE UP (ref 0.88–1.16)
INR BLD: 1.05 — SIGNIFICANT CHANGE UP (ref 0.88–1.16)
LYMPHOCYTES # BLD AUTO: 12.3 % — LOW (ref 13–44)
MAGNESIUM SERPL-MCNC: 2 MG/DL — SIGNIFICANT CHANGE UP (ref 1.6–2.6)
MCHC RBC-ENTMCNC: 28.8 PG — SIGNIFICANT CHANGE UP (ref 27–34)
MCHC RBC-ENTMCNC: 29.5 PG — SIGNIFICANT CHANGE UP (ref 27–34)
MCHC RBC-ENTMCNC: 31.8 G/DL — LOW (ref 32–36)
MCHC RBC-ENTMCNC: 32.4 G/DL — SIGNIFICANT CHANGE UP (ref 32–36)
MCV RBC AUTO: 90.6 FL — SIGNIFICANT CHANGE UP (ref 80–100)
MCV RBC AUTO: 91.2 FL — SIGNIFICANT CHANGE UP (ref 80–100)
MONOCYTES NFR BLD AUTO: 5.1 % — SIGNIFICANT CHANGE UP (ref 2–14)
NEUTROPHILS NFR BLD AUTO: 82.1 % — HIGH (ref 43–77)
NT-PROBNP SERPL-SCNC: 2862 PG/ML — HIGH (ref 0–300)
PLATELET # BLD AUTO: 107 K/UL — LOW (ref 150–400)
PLATELET # BLD AUTO: 116 K/UL — LOW (ref 150–400)
POTASSIUM SERPL-MCNC: 4.1 MMOL/L — SIGNIFICANT CHANGE UP (ref 3.5–5.3)
POTASSIUM SERPL-MCNC: 4.2 MMOL/L — SIGNIFICANT CHANGE UP (ref 3.5–5.3)
POTASSIUM SERPL-SCNC: 4.1 MMOL/L — SIGNIFICANT CHANGE UP (ref 3.5–5.3)
POTASSIUM SERPL-SCNC: 4.2 MMOL/L — SIGNIFICANT CHANGE UP (ref 3.5–5.3)
PROT SERPL-MCNC: 6.4 G/DL — SIGNIFICANT CHANGE UP (ref 6–8.3)
PROT SERPL-MCNC: 6.9 G/DL — SIGNIFICANT CHANGE UP (ref 6–8.3)
PROTHROM AB SERPL-ACNC: 10.7 SEC — SIGNIFICANT CHANGE UP (ref 9.8–12.7)
PROTHROM AB SERPL-ACNC: 11.7 SEC — SIGNIFICANT CHANGE UP (ref 9.8–12.7)
RBC # BLD: 3.72 M/UL — LOW (ref 3.8–5.2)
RBC # BLD: 3.73 M/UL — LOW (ref 3.8–5.2)
RBC # FLD: 14 % — SIGNIFICANT CHANGE UP (ref 10.3–16.9)
RBC # FLD: 14 % — SIGNIFICANT CHANGE UP (ref 10.3–16.9)
RH IG SCN BLD-IMP: NEGATIVE — SIGNIFICANT CHANGE UP
SODIUM SERPL-SCNC: 141 MMOL/L — SIGNIFICANT CHANGE UP (ref 135–145)
SODIUM SERPL-SCNC: 142 MMOL/L — SIGNIFICANT CHANGE UP (ref 135–145)
WBC # BLD: 4.6 K/UL — SIGNIFICANT CHANGE UP (ref 3.8–10.5)
WBC # BLD: 6.4 K/UL — SIGNIFICANT CHANGE UP (ref 3.8–10.5)
WBC # FLD AUTO: 4.6 K/UL — SIGNIFICANT CHANGE UP (ref 3.8–10.5)
WBC # FLD AUTO: 6.4 K/UL — SIGNIFICANT CHANGE UP (ref 3.8–10.5)

## 2018-06-21 PROCEDURE — 33361 REPLACE AORTIC VALVE PERQ: CPT | Mod: 62,Q0

## 2018-06-21 PROCEDURE — 33361 REPLACE AORTIC VALVE PERQ: CPT | Mod: 79,62,Q0

## 2018-06-21 PROCEDURE — 71045 X-RAY EXAM CHEST 1 VIEW: CPT | Mod: 26

## 2018-06-21 PROCEDURE — 93306 TTE W/DOPPLER COMPLETE: CPT | Mod: 26

## 2018-06-21 PROCEDURE — 99292 CRITICAL CARE ADDL 30 MIN: CPT

## 2018-06-21 PROCEDURE — 99291 CRITICAL CARE FIRST HOUR: CPT

## 2018-06-21 RX ORDER — CHLORHEXIDINE GLUCONATE 213 G/1000ML
5 SOLUTION TOPICAL EVERY 4 HOURS
Qty: 0 | Refills: 0 | Status: DISCONTINUED | OUTPATIENT
Start: 2018-06-21 | End: 2018-06-21

## 2018-06-21 RX ORDER — ADALIMUMAB 40MG/0.8ML
0 KIT SUBCUTANEOUS
Qty: 0 | Refills: 0 | COMMUNITY

## 2018-06-21 RX ORDER — HEPARIN SODIUM 5000 [USP'U]/ML
5000 INJECTION INTRAVENOUS; SUBCUTANEOUS EVERY 8 HOURS
Qty: 0 | Refills: 0 | Status: DISCONTINUED | OUTPATIENT
Start: 2018-06-21 | End: 2018-06-23

## 2018-06-21 RX ORDER — DOCUSATE SODIUM 100 MG
100 CAPSULE ORAL
Qty: 0 | Refills: 0 | Status: DISCONTINUED | OUTPATIENT
Start: 2018-06-21 | End: 2018-06-22

## 2018-06-21 RX ORDER — SODIUM CHLORIDE 9 MG/ML
1000 INJECTION INTRAMUSCULAR; INTRAVENOUS; SUBCUTANEOUS
Qty: 0 | Refills: 0 | Status: DISCONTINUED | OUTPATIENT
Start: 2018-06-21 | End: 2018-06-22

## 2018-06-21 RX ORDER — ALPRAZOLAM 0.25 MG
0.25 TABLET ORAL ONCE
Qty: 0 | Refills: 0 | Status: DISCONTINUED | OUTPATIENT
Start: 2018-06-21 | End: 2018-06-21

## 2018-06-21 RX ORDER — ATORVASTATIN CALCIUM 80 MG/1
80 TABLET, FILM COATED ORAL AT BEDTIME
Qty: 0 | Refills: 0 | Status: DISCONTINUED | OUTPATIENT
Start: 2018-06-21 | End: 2018-06-23

## 2018-06-21 RX ORDER — LEVOTHYROXINE SODIUM 125 MCG
88 TABLET ORAL DAILY
Qty: 0 | Refills: 0 | Status: DISCONTINUED | OUTPATIENT
Start: 2018-06-21 | End: 2018-06-23

## 2018-06-21 RX ORDER — ACETAMINOPHEN 500 MG
650 TABLET ORAL EVERY 6 HOURS
Qty: 0 | Refills: 0 | Status: DISCONTINUED | OUTPATIENT
Start: 2018-06-21 | End: 2018-06-23

## 2018-06-21 RX ORDER — PANTOPRAZOLE SODIUM 20 MG/1
40 TABLET, DELAYED RELEASE ORAL
Qty: 0 | Refills: 0 | Status: DISCONTINUED | OUTPATIENT
Start: 2018-06-21 | End: 2018-06-23

## 2018-06-21 RX ORDER — HYDRALAZINE HCL 50 MG
10 TABLET ORAL ONCE
Qty: 0 | Refills: 0 | Status: COMPLETED | OUTPATIENT
Start: 2018-06-21 | End: 2018-06-21

## 2018-06-21 RX ORDER — ASPIRIN/CALCIUM CARB/MAGNESIUM 324 MG
81 TABLET ORAL DAILY
Qty: 0 | Refills: 0 | Status: DISCONTINUED | OUTPATIENT
Start: 2018-06-22 | End: 2018-06-23

## 2018-06-21 RX ORDER — MINOCYCLINE HYDROCHLORIDE 45 MG/1
100 TABLET, EXTENDED RELEASE ORAL DAILY
Qty: 0 | Refills: 0 | Status: DISCONTINUED | OUTPATIENT
Start: 2018-06-21 | End: 2018-06-23

## 2018-06-21 RX ORDER — CEFAZOLIN SODIUM 1 G
2000 VIAL (EA) INJECTION EVERY 8 HOURS
Qty: 0 | Refills: 0 | Status: DISCONTINUED | OUTPATIENT
Start: 2018-06-21 | End: 2018-06-22

## 2018-06-21 RX ADMIN — ATORVASTATIN CALCIUM 80 MILLIGRAM(S): 80 TABLET, FILM COATED ORAL at 21:05

## 2018-06-21 RX ADMIN — Medication 100 MILLIGRAM(S): at 21:05

## 2018-06-21 RX ADMIN — MINOCYCLINE HYDROCHLORIDE 100 MILLIGRAM(S): 45 TABLET, EXTENDED RELEASE ORAL at 21:05

## 2018-06-21 RX ADMIN — HEPARIN SODIUM 5000 UNIT(S): 5000 INJECTION INTRAVENOUS; SUBCUTANEOUS at 21:57

## 2018-06-21 RX ADMIN — Medication 10 MILLIGRAM(S): at 15:45

## 2018-06-21 RX ADMIN — Medication 0.25 MILLIGRAM(S): at 23:28

## 2018-06-21 NOTE — H&P ADULT - NSHPPHYSICALEXAM_GEN_ALL_CORE
NEUROLOGICAL:  Alert and oriented x3, no gross deficits appreciated                   EYES:      PERRL           ENMT:  supple neck, no lymphadenopathy  CARDIOVASCULAR:     RRR, S1S2, 3/6 murmur LSB  RESPIRATORY:  equal breath sounds, no rales, no rhonchi, no wheeze  GASTROINTESTINAL:  soft, nontender, positive bowel sounds  : JENNINGS + / -  negative  MUSKULOSKELETAL:  moves all extremities  SKIN / BREAST:  no rash seen, mild purplish bruising LLE Lateral aspect  EXTREMITIES:  Warm and well perfused.   VASCULAR:    all pulses  intact (radial, femoral, DP/PT)

## 2018-06-21 NOTE — H&P ADULT - HISTORY OF PRESENT ILLNESS
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  On 5/3/18 patient underwent BAV with Dr. Singleton.  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 Arrived to ICU with right femoral TVP.   Post-op TTE done showing trace AR, severe AS MARY 0.8cm2, PPG 57mmhg, MPG 35mmHg.  Derm consulted for known h/o seborrheic dermatitis. Post-op EKG revealed NSR with QRS 98ms and beta-blockers initiated.  Placed on ASA 81mg QD only, no other anti-plts due to h/o GIB.  She was discharged home and presents today for her elective TAVR.      Patient seen in same day holding area; Reports no changes to PMHx or medications since last seen by our team. Denies acute or current SOB, chest pain, palpitation, N/V/D, fever/chills, recent illness, or any other concerning symptoms.

## 2018-06-21 NOTE — PROGRESS NOTE ADULT - SUBJECTIVE AND OBJECTIVE BOX
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  On 5/3/18 patient underwent BAV with Dr. Singleton.  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 Arrived to ICU with right femoral TVP.   Post-op TTE done showing trace AR, severe AS MARY 0.8cm2, PPG 57mmhg, MPG 35mmHg.  Derm consulted for known h/o seborrheic dermatitis. Post-op EKG revealed NSR with QRS 98ms and beta-blockers initiated.  Placed on ASA 81mg QD only, no other anti-plts due to h/o GIB.  She was discharged home and presents today for her elective TAVR.      Patient seen in same day holding area; Reports no changes to PMHx or medications since last seen by our team. Denies acute or current SOB, chest pain, palpitation, N/V/D, fever/chills, recent illness, or any other concerning symptoms.    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  ICU Vital Signs Last 24 Hrs  T(C): 36.4 (21 Jun 2018 20:54), Max: 36.9 (21 Jun 2018 09:53)  T(F): 97.5 (21 Jun 2018 20:54), Max: 98.4 (21 Jun 2018 09:53)  HR: 78 (21 Jun 2018 23:00) (61 - 94)  BP: 165/72 (21 Jun 2018 09:53) (165/72 - 165/72)  BP(mean): --  ABP: 112/40 (21 Jun 2018 23:00) (112/40 - 198/70)  ABP(mean): 64 (21 Jun 2018 23:00) (64 - 120)  RR: 20 (21 Jun 2018 23:00) (13 - 35)  SpO2: 96% (21 Jun 2018 23:00) (96% - 100%)    Adult Advanced Hemodynamics Last 24 Hrs  CVP(mm Hg): --  CVP(cm H2O): --  CO: --  CI: --  PA: --  PA(mean): --  PCWP: --  SVR: --  SVRI: --  PVR: --  PVRI: --, ABG - ( 21 Jun 2018 15:54 )  pH, Arterial: 7.43  pH, Blood: x     /  pCO2: 41    /  pO2: 106   / HCO3: 26    / Base Excess: 1.8   /  SaO2: 98                  Intake and output was reviewed and the fluid balance was calculated  Daily Height in cm: 152.4 (21 Jun 2018 09:53)    Daily   I&O's Summary    21 Jun 2018 07:01  -  21 Jun 2018 23:54  --------------------------------------------------------  IN: 70 mL / OUT: 1150 mL / NET: -1080 mL        All lines and drain sites were assessed  Glycemic trend was reviewedCAPILLARY BLOOD GLUCOSE        No acute change in mental status  Auscultation of the chest reveals equal bs  Abdomen is soft  Extremities are warm and well perfused  Wounds appear clean and unremarkable  Antibiotics are periop    labs  CBC Full  -  ( 21 Jun 2018 15:54 )  WBC Count : 6.4 K/uL  Hemoglobin : 10.7 g/dL  Hematocrit : 33.7 %  Platelet Count - Automated : 107 K/uL  Mean Cell Volume : 90.6 fL  Mean Cell Hemoglobin : 28.8 pg  Mean Cell Hemoglobin Concentration : 31.8 g/dL  Auto Neutrophil # : x  Auto Lymphocyte # : x  Auto Monocyte # : x  Auto Eosinophil # : x  Auto Basophil # : x  Auto Neutrophil % : 82.1 %  Auto Lymphocyte % : 12.3 %  Auto Monocyte % : 5.1 %  Auto Eosinophil % : 0.3 %  Auto Basophil % : 0.2 %    06-21    142  |  104  |  20  ----------------------------<  101<H>  4.1   |  27  |  0.86    Ca    9.2      21 Jun 2018 15:53  Mg     2.0     06-21    TPro  6.4  /  Alb  3.7  /  TBili  0.7  /  DBili  x   /  AST  23  /  ALT  15  /  AlkPhos  50  06-21    PT/INR - ( 21 Jun 2018 15:53 )   PT: 11.7 sec;   INR: 1.05          PTT - ( 21 Jun 2018 15:53 )  PTT:37.9 sec  The current medications were reviewed   MEDICATIONS  (STANDING):  atorvastatin 80 milliGRAM(s) Oral at bedtime  ceFAZolin   IVPB 2000 milliGRAM(s) IV Intermittent every 8 hours  docusate sodium 100 milliGRAM(s) Oral two times a day  heparin  Injectable 5000 Unit(s) SubCutaneous every 8 hours  levothyroxine 88 MICROGram(s) Oral daily  minocycline 100 milliGRAM(s) Oral daily  pantoprazole    Tablet 40 milliGRAM(s) Oral before breakfast  sodium chloride 0.9%. 1000 milliLiter(s) (10 mL/Hr) IV Continuous <Continuous>    MEDICATIONS  (PRN):  acetaminophen   Tablet. 650 milliGRAM(s) Oral every 6 hours PRN Mild Pain (1 - 3)       PROBLEM LIST/ ASSESSMENT:  HEALTH ISSUES - PROBLEM Dx:  Aortic stenosis: Aortic stenosis        Patient is a 89y old  Female who presents with a chief complaint of Aortic Stenosis.     s/p   acute changes include acute respiratory failure    My plan includes :  close hemodynamic, ventilatory and drain monitoring and management per post op routine    Monitor for arrhythmias and monitor parameters for organ perfusion  monitor neurologic status  Head of the bed should remain elevated to 45 deg .   chest PT and IS will be encouraged  monitor adequacy of oxygenation and ventilation and attempt to wean oxygen  Nutritional goals will be met using po eventually , ensure adequate caloric intake and montior the same  Stress ulcer and VTE prophylaxis will be achieved    Glycemic control is satisfactory  Electrolytes have been repleted as necessary and wound care has been carried out. Pain control has been achieved.   agressive physical therapy and early mobility and ambulation goals will be met   The family was updated about the course and plan  CRITICAL CARE TIME SPENT in evaluation and management, reassessments, review and interpretation of labs and x-rays, ventilator and hemodynamic management, formulating a plan and coordinating care: ___30____ MIN.  Time does not include procedural time.  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  On 5/3/18 patient underwent BAV with Dr. Singleton.  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 Arrived to ICU with right femoral TVP.   Post-op TTE done showing trace AR, severe AS MARY 0.8cm2, PPG 57mmhg, MPG 35mmHg.  Derm consulted for known h/o seborrheic dermatitis. Post-op EKG revealed NSR with QRS 98ms and beta-blockers initiated.  Placed on ASA 81mg QD only, no other anti-plts due to h/o GIB.  She was discharged home and presents today for her elective TAVR.      Patient seen in same day holding area; Reports no changes to PMHx or medications since last seen by our team. Denies acute or current SOB, chest pain, palpitation, N/V/D, fever/chills, recent illness, or any other concerning symptoms.    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  ICU Vital Signs Last 24 Hrs  T(C): 36.4 (21 Jun 2018 20:54), Max: 36.9 (21 Jun 2018 09:53)  T(F): 97.5 (21 Jun 2018 20:54), Max: 98.4 (21 Jun 2018 09:53)  HR: 78 (21 Jun 2018 23:00) (61 - 94)  BP: 165/72 (21 Jun 2018 09:53) (165/72 - 165/72)  BP(mean): --  ABP: 112/40 (21 Jun 2018 23:00) (112/40 - 198/70)  ABP(mean): 64 (21 Jun 2018 23:00) (64 - 120)  RR: 20 (21 Jun 2018 23:00) (13 - 35)  SpO2: 96% (21 Jun 2018 23:00) (96% - 100%)    Adult Advanced Hemodynamics Last 24 Hrs  CVP(mm Hg): --  CVP(cm H2O): --  CO: --  CI: --  PA: --  PA(mean): --  PCWP: --  SVR: --  SVRI: --  PVR: --  PVRI: --, ABG - ( 21 Jun 2018 15:54 )  pH, Arterial: 7.43  pH, Blood: x     /  pCO2: 41    /  pO2: 106   / HCO3: 26    / Base Excess: 1.8   /  SaO2: 98                  Intake and output was reviewed and the fluid balance was calculated  Daily Height in cm: 152.4 (21 Jun 2018 09:53)    Daily   I&O's Summary    21 Jun 2018 07:01  -  21 Jun 2018 23:54  --------------------------------------------------------  IN: 70 mL / OUT: 1150 mL / NET: -1080 mL        All lines and drain sites were assessed  Glycemic trend was reviewed CAPILLARY BLOOD GLUCOSE        NEURO: No acute change in mental status.  CVS: S1, S2, No S3.  RESPIRATORY: Auscultation of the chest reveals equal breath sounds.  GI: Abdomen is soft. No tenderness. + Bowel sounds.  Extremities are warm and well perfused. + lower extremity edema.  VASCULAR: + Distal pulses.  SKIN: Multiple areas of ecchymoses in upper extremities.  Wounds appear clean and unremarkable  Antibiotics are perioperative cefazolin.    labs  CBC Full  -  ( 21 Jun 2018 15:54 )  WBC Count : 6.4 K/uL  Hemoglobin : 10.7 g/dL  Hematocrit : 33.7 %  Platelet Count - Automated : 107 K/uL  Mean Cell Volume : 90.6 fL  Mean Cell Hemoglobin : 28.8 pg  Mean Cell Hemoglobin Concentration : 31.8 g/dL  Auto Neutrophil # : x  Auto Lymphocyte # : x  Auto Monocyte # : x  Auto Eosinophil # : x  Auto Basophil # : x  Auto Neutrophil % : 82.1 %  Auto Lymphocyte % : 12.3 %  Auto Monocyte % : 5.1 %  Auto Eosinophil % : 0.3 %  Auto Basophil % : 0.2 %    06-21    142  |  104  |  20  ----------------------------<  101<H>  4.1   |  27  |  0.86    Ca    9.2      21 Jun 2018 15:53  Mg     2.0     06-21    TPro  6.4  /  Alb  3.7  /  TBili  0.7  /  DBili  x   /  AST  23  /  ALT  15  /  AlkPhos  50  06-21    PT/INR - ( 21 Jun 2018 15:53 )   PT: 11.7 sec;   INR: 1.05          PTT - ( 21 Jun 2018 15:53 )  PTT:37.9 sec  The current medications were reviewed   MEDICATIONS  (STANDING):  atorvastatin 80 milliGRAM(s) Oral at bedtime  ceFAZolin   IVPB 2000 milliGRAM(s) IV Intermittent every 8 hours  docusate sodium 100 milliGRAM(s) Oral two times a day  heparin  Injectable 5000 Unit(s) SubCutaneous every 8 hours  levothyroxine 88 MICROGram(s) Oral daily  minocycline 100 milliGRAM(s) Oral daily  pantoprazole    Tablet 40 milliGRAM(s) Oral before breakfast  sodium chloride 0.9%. 1000 milliLiter(s) (10 mL/Hr) IV Continuous <Continuous>    MEDICATIONS  (PRN):  acetaminophen   Tablet. 650 milliGRAM(s) Oral every 6 hours PRN Mild Pain (1 - 3)      PROBLEM LIST/ ASSESSMENT:  HEALTH ISSUES - PROBLEM Dx:  Aortic stenosis: Aortic stenosis        Patient is a 89y old  Female who presents with Aortic Stenosis.  S/P TAVR.  Hemodynamically stable.  Fair oxygenation.  Good urine output.  Overall doing well.         My plan includes :  Close hemodynamic, ventilatory and drain monitoring and management per post op routine  D/C lines  in AM.  Monitor for arrhythmias and monitor parameters for organ perfusion  Monitor neurologic status  Head of the bed should remain elevated to 45 deg .   Chest PT and IS will be encouraged  Monitor adequacy of oxygenation and ventilation and attempt to wean oxygen  Nutritional goals will be met using po eventually , ensure adequate caloric intake and monitor  the same  Stress ulcer and VTE prophylaxis will be achieved    Glycemic control is satisfactory  Electrolytes have been repleted as necessary and wound care has been carried out. Pain control has been achieved.   Aggressive  physical therapy and early mobility and ambulation goals will be met   The family was updated about the course and plan  CRITICAL CARE TIME SPENT in evaluation and management, reassessments, review and interpretation of labs and x-rays, ventilator and hemodynamic management, formulating a plan and coordinating care: ___30____ MIN.  Time does not include procedural time.  CTICU ATTENDING     					    Barron Denis MD

## 2018-06-21 NOTE — H&P ADULT - NSHPLABSRESULTS_GEN_ALL_CORE
< from: Echocardiogram (05.04.18 @ 12:22) >    Interpretation Summary  The left atrium is mildly dilated. Right atrial size is normal.Calcified   aortic   valve. There is trace aortic regurgitation. There is Severe aortic   stenosis.   The calculated aortic valve area using thecontinuity equation is 0.8   cm2. The   calculated aortic valve area indexed to body surface area is 0.5 cm2/m2.   The   peak pressure gradient is 57 mmHg. The mean pressure gradient is 35 mmHg.   There is moderate mitral annular calcification. Thereis mild mitral   valve   thickening. There is trace mitral regurgitation. The mean pressure   gradient is   5 mmHg.  Structurally normal tricuspid valve. There is mild tricuspid   regurgitation. The pulmonary artery systolic pressure is estimated to be   37   mmHg.  The pulmonic valve is not well visualized. There is mild pulmonic   regurgitation.  There is a pacemaker wire in the right heart. Probably   normal   right ventricular size and function.  There is a septal "knuckle" with   no left   ventricular outflow obstruction There is mild concentric left ventricular   hypertrophy. The left ventricular wall motion is normal. The left   ventricular   ejection fraction is 57%.  No aortic root dilatation.There is no   pericardial   effusion.Whencompared to prior study performed on 5/2/18, mean pressure   across the aortic valve has improved slightly from 40 mmHg.  Procedure Details  A complete two-dimensional transthoracic echocardiogram was performed (2D,  M-mode, spectral and color flow doppler).    < end of copied text >    ***Refer to paper chart and [facility] for remaining reports of studies****

## 2018-06-21 NOTE — PROGRESS NOTE ADULT - SUBJECTIVE AND OBJECTIVE BOX
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  ICU Vital Signs Last 24 Hrs  T(C): 36.2 (21 Jun 2018 15:30), Max: 36.9 (21 Jun 2018 09:53)  T(F): 97.1 (21 Jun 2018 15:30), Max: 98.4 (21 Jun 2018 09:53)  HR: 72 (21 Jun 2018 16:45) (61 - 76)  BP: 165/72 (21 Jun 2018 09:53) (165/72 - 165/72)  BP(mean): --  ABP: 136/52 (21 Jun 2018 16:45) (136/52 - 198/70)  ABP(mean): 84 (21 Jun 2018 16:45) (84 - 120)  RR: 15 (21 Jun 2018 16:45) (13 - 34)  SpO2: 100% (21 Jun 2018 16:45) (99% - 100%)    Adult Advanced Hemodynamics Last 24 Hrs  CVP(mm Hg): --  CVP(cm H2O): --  CO: --  CI: --  PA: --  PA(mean): --  PCWP: --  SVR: --  SVRI: --  PVR: --  PVRI: --, ABG - ( 21 Jun 2018 15:54 )  pH, Arterial: 7.43  pH, Blood: x     /  pCO2: 41    /  pO2: 106   / HCO3: 26    / Base Excess: 1.8   /  SaO2: 98                  Intake and output was reviewed and the fluid balance was calculated  Daily Height in cm: 152.4 (21 Jun 2018 09:53)    Daily   I&O's Summary    21 Jun 2018 07:01  -  21 Jun 2018 17:15  --------------------------------------------------------  IN: 20 mL / OUT: 400 mL / NET: -380 mL        All lines and drain sites were assessed  Glycemic trend was reviewedCAPILLARY BLOOD GLUCOSE        No acute change in mental status  Auscultation of the chest reveals equal bs  Abdomen is soft  Extremities are warm and well perfused  Wounds appear clean and unremarkable  Antibiotics are periop    labs  CBC Full  -  ( 21 Jun 2018 15:54 )  WBC Count : 6.4 K/uL  Hemoglobin : 10.7 g/dL  Hematocrit : 33.7 %  Platelet Count - Automated : 107 K/uL  Mean Cell Volume : 90.6 fL  Mean Cell Hemoglobin : 28.8 pg  Mean Cell Hemoglobin Concentration : 31.8 g/dL  Auto Neutrophil # : x  Auto Lymphocyte # : x  Auto Monocyte # : x  Auto Eosinophil # : x  Auto Basophil # : x  Auto Neutrophil % : 82.1 %  Auto Lymphocyte % : 12.3 %  Auto Monocyte % : 5.1 %  Auto Eosinophil % : 0.3 %  Auto Basophil % : 0.2 %    06-21    142  |  104  |  20  ----------------------------<  101<H>  4.1   |  27  |  0.86    Ca    9.2      21 Jun 2018 15:53  Mg     2.0     06-21    TPro  6.4  /  Alb  3.7  /  TBili  0.7  /  DBili  x   /  AST  23  /  ALT  15  /  AlkPhos  50  06-21    PT/INR - ( 21 Jun 2018 15:53 )   PT: 11.7 sec;   INR: 1.05          PTT - ( 21 Jun 2018 15:53 )  PTT:37.9 sec  The current medications were reviewed   MEDICATIONS  (STANDING):  atorvastatin 80 milliGRAM(s) Oral at bedtime  ceFAZolin   IVPB 2000 milliGRAM(s) IV Intermittent every 8 hours  chlorhexidine 0.12% Liquid 5 milliLiter(s) Swish and Spit every 4 hours  docusate sodium 100 milliGRAM(s) Oral two times a day  heparin  Injectable 5000 Unit(s) SubCutaneous every 8 hours  hydrALAZINE Injectable 10 milliGRAM(s) IV Push once  levothyroxine 88 MICROGram(s) Oral daily  pantoprazole    Tablet 40 milliGRAM(s) Oral before breakfast  sodium chloride 0.9%. 1000 milliLiter(s) (10 mL/Hr) IV Continuous <Continuous>    MEDICATIONS  (PRN):  acetaminophen   Tablet. 650 milliGRAM(s) Oral every 6 hours PRN Mild Pain (1 - 3)       PROBLEM LIST/ ASSESSMENT:  HEALTH ISSUES - PROBLEM Dx:  Aortic stenosis: Aortic stenosis      ,   Patient is a 89y old  Female who presents with a chief complaint of Aortic Stenosis (21 Jun 2018 08:42)     s/p tavr      My plan includes :  close hemodynamic, ventilatory and drain monitoring and management per post op routine    Monitor for arrhythmias and monitor parameters for organ perfusion  monitor neurologic status  Head of the bed should remain elevated to 45 deg .   chest PT and IS will be encouraged  monitor adequacy of oxygenation and ventilation and attempt to wean oxygen  Nutritional goals will be met using po eventually , ensure adequate caloric intake and montior the same  Stress ulcer and VTE prophylaxis will be achieved    Glycemic control is satisfactory  Electrolytes have been repleted as necessary and wound care has been carried out. Pain control has been achieved.   agressive physical therapy and early mobility and ambulation goals will be met   The family was updated about the course and plan  CRITICAL CARE TIME SPENT in evaluation and management, reassessments, review and interpretation of labs and x-rays, ventilator and hemodynamic management, formulating a plan and coordinating care: ___90____ MIN.  Time does not include procedural time.  CTICU ATTENDING     					    Manolo Reno MD

## 2018-06-21 NOTE — H&P ADULT - NSHPREVIEWOFSYSTEMS_GEN_ALL_CORE
Review of Systems  CONSTITUTIONAL:  Denies Fevers / chills, sweats, fatigue, weight loss, weight gain                                      NEURO:  Denies parethesias, seizures, syncope, confusion                                                                                EYES:  Denies Blurry vision, discharge, pain, loss of vision                                                                                    ENMT:  Denies Difficulty hearing, vertigo, dysphagia, epistaxis, recent dental work                                       CV:  Denies Chest pain, palpitations, DELVAL[place], orthopnea                                                                                          RESPIRATORY:  Denies Wheezing, SOB, cough / sputum, hemoptysis                                                                GI:  Denies Nausea, vomiting, diarrhea, constipation, melena, difficulty swallowing                                               : Denies Hematuria, dysuria, urgency, incontinence                                                                                         MUSCULOSKELETAL:  Denies arthritis, joint swelling, muscle weakness                                                             SKIN/BREAST:  Denies rash, itching, hair loss, masses                                                                                            PSYCH:  Denies depression, anxiety, suicidal ideation                                                                                               HEME/LYMPH:  Denies bruises easily, enlarged lymph nodes, tender lymph nodes                                        ENDOCRINE:  Denies cold intolerance, heat intolerance, polydipsia

## 2018-06-21 NOTE — H&P ADULT - PROBLEM SELECTOR PLAN 1
Admit under Dr. Singleton via same day surgery for TAVR. Consent signed, placed on chart.  Risks/benefits reviewed, patient understands and agrees. T&S ordered and blood products placed on hold for OR.  To 9E/9LA post-op.

## 2018-06-21 NOTE — H&P ADULT - DOES THIS PATIENT HAVE A HISTORY OF OR HAS BEEN DX WITH HEART FAILURE?
whichever brand is covered by insurance 100 each 3    Blood Glucose Calibration (PRODIGY CONTROL SOLUTION) HIGH SOLN 1 each by In Vitro route three times daily Test before using machine. Ok to dispense which ever brand is covered by insurance. 1 each 0    Insulin Pen Needle (PEN NEEDLES 31GX5/16\") 31G X 8 MM MISC 1 each by Does not apply route daily. 100 each 12    insulin glargine (LANTUS) 100 UNIT/ML injection vial Inject 50 Units into the skin daily. (Patient taking differently: Inject 52 Units into the skin daily Takes in AM) 1 vial 10    rosuvastatin (CRESTOR) 10 MG tablet Take 1 tablet by mouth daily. 30 tablet 3    albuterol (PROVENTIL) (2.5 MG/3ML) 0.083% nebulizer solution Take 2.5 mg by nebulization every 6 hours as needed for Shortness of Breath. No current facility-administered medications for this visit. Objective:   She  is alert, oriented x 3, pleasant, well nourished, developed and in no acute distress. Temp 97.2 °F (36.2 °C) (Temporal)   Ht 5' 10\" (1.778 m)   Wt 224 lb (101.6 kg)   LMP 05/18/1975   BMI 32.14 kg/m²      HIP EXAM:  Examination of the right hip shows: The incision is healed. There is no erythema. There is no pain with internal and external rotation. There is no pain with flexion and extension. There is no pain with active SLR. Leg lengths: equal  There is no pain with weight bearing. X Rays: performed in the office today:   AP Pelvis, AP and Frog Leg Lateral  Right Hip: There is a right prosthetic total hip arthroplasty present. The alignment is satisfactory. There are no signs of failure, dislocation or loosening. Assessment:       ICD-10-CM ICD-9-CM    1. H/O total hip arthroplasty, right-7.18.2017 Z96.641 V43.64 XR HIP 2-3 VW W PELVIS RIGHT        Plan:     Continue with a PT on a  HEP as discussed. This is important for the first 1-2 years post-op. Activity restrictions discussed today.   Need for dental prophylactics discussed today.    Follow Up: 6 months. Call or return to clinic prn if these symptoms worsen or fail to improve as anticipated. no

## 2018-06-22 LAB
ALBUMIN SERPL ELPH-MCNC: 3.5 G/DL — SIGNIFICANT CHANGE UP (ref 3.3–5)
ALP SERPL-CCNC: 39 U/L — LOW (ref 40–120)
ALT FLD-CCNC: 12 U/L — SIGNIFICANT CHANGE UP (ref 10–45)
ANION GAP SERPL CALC-SCNC: 12 MMOL/L — SIGNIFICANT CHANGE UP (ref 5–17)
APTT BLD: 32.9 SEC — SIGNIFICANT CHANGE UP (ref 27.5–37.4)
AST SERPL-CCNC: 23 U/L — SIGNIFICANT CHANGE UP (ref 10–40)
BILIRUB SERPL-MCNC: 0.6 MG/DL — SIGNIFICANT CHANGE UP (ref 0.2–1.2)
BUN SERPL-MCNC: 20 MG/DL — SIGNIFICANT CHANGE UP (ref 7–23)
CALCIUM SERPL-MCNC: 8.8 MG/DL — SIGNIFICANT CHANGE UP (ref 8.4–10.5)
CHLORIDE SERPL-SCNC: 102 MMOL/L — SIGNIFICANT CHANGE UP (ref 96–108)
CO2 SERPL-SCNC: 26 MMOL/L — SIGNIFICANT CHANGE UP (ref 22–31)
CREAT SERPL-MCNC: 0.9 MG/DL — SIGNIFICANT CHANGE UP (ref 0.5–1.3)
GAS PNL BLDA: SIGNIFICANT CHANGE UP
GLUCOSE SERPL-MCNC: 106 MG/DL — HIGH (ref 70–99)
HCT VFR BLD CALC: 29.3 % — LOW (ref 34.5–45)
HGB BLD-MCNC: 9.6 G/DL — LOW (ref 11.5–15.5)
INR BLD: 1.1 — SIGNIFICANT CHANGE UP (ref 0.88–1.16)
LACTATE SERPL-SCNC: 0.9 MMOL/L — SIGNIFICANT CHANGE UP (ref 0.5–2)
MAGNESIUM SERPL-MCNC: 1.8 MG/DL — SIGNIFICANT CHANGE UP (ref 1.6–2.6)
MCHC RBC-ENTMCNC: 30.1 PG — SIGNIFICANT CHANGE UP (ref 27–34)
MCHC RBC-ENTMCNC: 32.8 G/DL — SIGNIFICANT CHANGE UP (ref 32–36)
MCV RBC AUTO: 91.8 FL — SIGNIFICANT CHANGE UP (ref 80–100)
PHOSPHATE SERPL-MCNC: 3.7 MG/DL — SIGNIFICANT CHANGE UP (ref 2.5–4.5)
PLATELET # BLD AUTO: 100 K/UL — LOW (ref 150–400)
POTASSIUM SERPL-MCNC: 3.6 MMOL/L — SIGNIFICANT CHANGE UP (ref 3.5–5.3)
POTASSIUM SERPL-SCNC: 3.6 MMOL/L — SIGNIFICANT CHANGE UP (ref 3.5–5.3)
PROT SERPL-MCNC: 6 G/DL — SIGNIFICANT CHANGE UP (ref 6–8.3)
PROTHROM AB SERPL-ACNC: 12.2 SEC — SIGNIFICANT CHANGE UP (ref 9.8–12.7)
RBC # BLD: 3.19 M/UL — LOW (ref 3.8–5.2)
RBC # FLD: 13.8 % — SIGNIFICANT CHANGE UP (ref 10.3–16.9)
SODIUM SERPL-SCNC: 140 MMOL/L — SIGNIFICANT CHANGE UP (ref 135–145)
WBC # BLD: 6.6 K/UL — SIGNIFICANT CHANGE UP (ref 3.8–10.5)
WBC # FLD AUTO: 6.6 K/UL — SIGNIFICANT CHANGE UP (ref 3.8–10.5)

## 2018-06-22 PROCEDURE — 93010 ELECTROCARDIOGRAM REPORT: CPT

## 2018-06-22 PROCEDURE — 93306 TTE W/DOPPLER COMPLETE: CPT | Mod: 26

## 2018-06-22 PROCEDURE — 71045 X-RAY EXAM CHEST 1 VIEW: CPT | Mod: 26

## 2018-06-22 RX ORDER — DOCUSATE SODIUM 100 MG
100 CAPSULE ORAL THREE TIMES A DAY
Qty: 0 | Refills: 0 | Status: DISCONTINUED | OUTPATIENT
Start: 2018-06-22 | End: 2018-06-23

## 2018-06-22 RX ORDER — SODIUM CHLORIDE 9 MG/ML
3 INJECTION INTRAMUSCULAR; INTRAVENOUS; SUBCUTANEOUS EVERY 8 HOURS
Qty: 0 | Refills: 0 | Status: DISCONTINUED | OUTPATIENT
Start: 2018-06-22 | End: 2018-06-22

## 2018-06-22 RX ORDER — POTASSIUM CHLORIDE 20 MEQ
40 PACKET (EA) ORAL ONCE
Qty: 0 | Refills: 0 | Status: COMPLETED | OUTPATIENT
Start: 2018-06-22 | End: 2018-06-22

## 2018-06-22 RX ORDER — POTASSIUM CHLORIDE 20 MEQ
20 PACKET (EA) ORAL ONCE
Qty: 0 | Refills: 0 | Status: COMPLETED | OUTPATIENT
Start: 2018-06-22 | End: 2018-06-22

## 2018-06-22 RX ORDER — MAGNESIUM SULFATE 500 MG/ML
2 VIAL (ML) INJECTION ONCE
Qty: 0 | Refills: 0 | Status: COMPLETED | OUTPATIENT
Start: 2018-06-22 | End: 2018-06-22

## 2018-06-22 RX ORDER — METOPROLOL TARTRATE 50 MG
12.5 TABLET ORAL EVERY 12 HOURS
Qty: 0 | Refills: 0 | Status: DISCONTINUED | OUTPATIENT
Start: 2018-06-22 | End: 2018-06-23

## 2018-06-22 RX ORDER — SENNA PLUS 8.6 MG/1
1 TABLET ORAL AT BEDTIME
Qty: 0 | Refills: 0 | Status: DISCONTINUED | OUTPATIENT
Start: 2018-06-22 | End: 2018-06-23

## 2018-06-22 RX ADMIN — Medication 12.5 MILLIGRAM(S): at 18:45

## 2018-06-22 RX ADMIN — PANTOPRAZOLE SODIUM 40 MILLIGRAM(S): 20 TABLET, DELAYED RELEASE ORAL at 07:09

## 2018-06-22 RX ADMIN — Medication 100 MILLIGRAM(S): at 07:07

## 2018-06-22 RX ADMIN — HEPARIN SODIUM 5000 UNIT(S): 5000 INJECTION INTRAVENOUS; SUBCUTANEOUS at 14:49

## 2018-06-22 RX ADMIN — Medication 88 MICROGRAM(S): at 07:12

## 2018-06-22 RX ADMIN — Medication 40 MILLIEQUIVALENT(S): at 10:07

## 2018-06-22 RX ADMIN — Medication 50 GRAM(S): at 07:07

## 2018-06-22 RX ADMIN — SODIUM CHLORIDE 3 MILLILITER(S): 9 INJECTION INTRAMUSCULAR; INTRAVENOUS; SUBCUTANEOUS at 07:12

## 2018-06-22 RX ADMIN — MINOCYCLINE HYDROCHLORIDE 100 MILLIGRAM(S): 45 TABLET, EXTENDED RELEASE ORAL at 12:49

## 2018-06-22 RX ADMIN — HEPARIN SODIUM 5000 UNIT(S): 5000 INJECTION INTRAVENOUS; SUBCUTANEOUS at 07:07

## 2018-06-22 RX ADMIN — Medication 81 MILLIGRAM(S): at 12:48

## 2018-06-22 RX ADMIN — Medication 100 MILLIGRAM(S): at 21:16

## 2018-06-22 RX ADMIN — HEPARIN SODIUM 5000 UNIT(S): 5000 INJECTION INTRAVENOUS; SUBCUTANEOUS at 21:15

## 2018-06-22 RX ADMIN — SENNA PLUS 1 TABLET(S): 8.6 TABLET ORAL at 21:16

## 2018-06-22 RX ADMIN — Medication 20 MILLIEQUIVALENT(S): at 07:09

## 2018-06-22 RX ADMIN — Medication 100 MILLIGRAM(S): at 15:49

## 2018-06-22 RX ADMIN — ATORVASTATIN CALCIUM 80 MILLIGRAM(S): 80 TABLET, FILM COATED ORAL at 21:19

## 2018-06-22 NOTE — PROGRESS NOTE ADULT - SUBJECTIVE AND OBJECTIVE BOX
Transfer from     Operation / Date: TF TAVR lesvia 6/21/18     SUBJECTIVE ASSESSMENT:  89y Female         Vital Signs Last 24 Hrs  T(C): 36.5 (22 Jun 2018 10:00), Max: 36.5 (22 Jun 2018 10:00)  T(F): 97.7 (22 Jun 2018 10:00), Max: 97.7 (22 Jun 2018 10:00)  HR: 60 (22 Jun 2018 10:00) (60 - 94)  BP: 138/78 (22 Jun 2018 10:00) (138/78 - 138/78)  BP(mean): 92 (22 Jun 2018 10:00) (92 - 92)  RR: 27 (22 Jun 2018 10:00) (13 - 35)  SpO2: 99% (22 Jun 2018 10:00) (93% - 100%)  I&O's Detail    21 Jun 2018 07:01  -  22 Jun 2018 07:00  --------------------------------------------------------  IN:    IV PiggyBack: 100 mL    sodium chloride 0.9%: 20 mL  Total IN: 120 mL    OUT:    Voided: 1150 mL  Total OUT: 1150 mL    Total NET: -1030 mL          CHEST TUBE:  NO  CANDY DRAIN:  No.  EPICARDIAL WIRES: No.  TIE DOWNS: No.  JENNINGS: No.    PHYSICAL EXAM:    General:     Neurological:    Cardiovascular:    Respiratory:    Gastrointestinal:    Extremities:    Vascular:    Incision Sites:    LABS:                        9.6    6.6   )-----------( 100      ( 22 Jun 2018 03:31 )             29.3       COUMADIN:  No    PT/INR - ( 22 Jun 2018 03:29 )   PT: 12.2 sec;   INR: 1.10          PTT - ( 22 Jun 2018 03:29 )  PTT:32.9 sec    06-22    140  |  102  |  20  ----------------------------<  106<H>  3.6   |  26  |  0.90    Ca    8.8      22 Jun 2018 03:29  Phos  3.7     06-22  Mg     1.8     06-22    TPro  6.0  /  Alb  3.5  /  TBili  0.6  /  DBili  x   /  AST  23  /  ALT  12  /  AlkPhos  39<L>  06-22          MEDICATIONS  (STANDING):  aspirin enteric coated 81 milliGRAM(s) Oral daily  atorvastatin 80 milliGRAM(s) Oral at bedtime  docusate sodium 100 milliGRAM(s) Oral three times a day  heparin  Injectable 5000 Unit(s) SubCutaneous every 8 hours  levothyroxine 88 MICROGram(s) Oral daily  metoprolol tartrate 12.5 milliGRAM(s) Oral every 12 hours  minocycline 100 milliGRAM(s) Oral daily  pantoprazole    Tablet 40 milliGRAM(s) Oral before breakfast  potassium chloride    Tablet ER 40 milliEquivalent(s) Oral once  senna 1 Tablet(s) Oral at bedtime    MEDICATIONS  (PRN):  acetaminophen   Tablet. 650 milliGRAM(s) Oral every 6 hours PRN Mild Pain (1 - 3)        RADIOLOGY & ADDITIONAL TESTS:  < from: Xray Chest 1 View- PORTABLE-Routine (06.22.18 @ 05:44) >  History: Abnormal breath sounds postop follow-up exam    Similar appearance to prior exam 6/21/2018 with postoperative changes. No   focal or acute infiltrate. No pleural effusion. No pneumothorax.    < end of copied text > Transfer from     Operation / Date: TF TAVR lesvia 6/21/18     SUBJECTIVE ASSESSMENT:  89y Female seen and examined. Feels well, no acute complaints. Is ambulating, tolerating PO diet. Denies fever, chest pain, palpitations, SOB, abdominal pain, n/v.        Vital Signs Last 24 Hrs  T(C): 36.5 (22 Jun 2018 10:00), Max: 36.5 (22 Jun 2018 10:00)  T(F): 97.7 (22 Jun 2018 10:00), Max: 97.7 (22 Jun 2018 10:00)  HR: 60 (22 Jun 2018 10:00) (60 - 94)  BP: 138/78 (22 Jun 2018 10:00) (138/78 - 138/78)  BP(mean): 92 (22 Jun 2018 10:00) (92 - 92)  RR: 27 (22 Jun 2018 10:00) (13 - 35)  SpO2: 99% (22 Jun 2018 10:00) (93% - 100%)  I&O's Detail    21 Jun 2018 07:01  -  22 Jun 2018 07:00  --------------------------------------------------------  IN:    IV PiggyBack: 100 mL    sodium chloride 0.9%: 20 mL  Total IN: 120 mL    OUT:    Voided: 1150 mL  Total OUT: 1150 mL    Total NET: -1030 mL          CHEST TUBE:  NO  CANDY DRAIN:  No.  EPICARDIAL WIRES: No.  TIE DOWNS: No.  JENNINGS: No.    PHYSICAL EXAM:    General: Patient lying comfortably in bed, no acute distress     Neurological: Alert and oriented. No focal neurological deficits     Cardiovascular: S1S2, RRR, no murmurs appreciated on exam     Respiratory: Clear to ausculation bilaterally, no wheeze/rhonchi/rales    Gastrointestinal: Abdomen soft, non tender, non distended     Extremities: Warm and well perfused. trace b/l LE edema,  no calf tenderness     Vascular: 2+ Peripheral pulses b/l     Incision Sites: b/l groin: soft nonhematoma, +moderate ecchymosis    LABS:                        9.6    6.6   )-----------( 100      ( 22 Jun 2018 03:31 )             29.3       COUMADIN:  No    PT/INR - ( 22 Jun 2018 03:29 )   PT: 12.2 sec;   INR: 1.10          PTT - ( 22 Jun 2018 03:29 )  PTT:32.9 sec    06-22    140  |  102  |  20  ----------------------------<  106<H>  3.6   |  26  |  0.90    Ca    8.8      22 Jun 2018 03:29  Phos  3.7     06-22  Mg     1.8     06-22    TPro  6.0  /  Alb  3.5  /  TBili  0.6  /  DBili  x   /  AST  23  /  ALT  12  /  AlkPhos  39<L>  06-22          MEDICATIONS  (STANDING):  aspirin enteric coated 81 milliGRAM(s) Oral daily  atorvastatin 80 milliGRAM(s) Oral at bedtime  docusate sodium 100 milliGRAM(s) Oral three times a day  heparin  Injectable 5000 Unit(s) SubCutaneous every 8 hours  levothyroxine 88 MICROGram(s) Oral daily  metoprolol tartrate 12.5 milliGRAM(s) Oral every 12 hours  minocycline 100 milliGRAM(s) Oral daily  pantoprazole    Tablet 40 milliGRAM(s) Oral before breakfast  potassium chloride    Tablet ER 40 milliEquivalent(s) Oral once  senna 1 Tablet(s) Oral at bedtime    MEDICATIONS  (PRN):  acetaminophen   Tablet. 650 milliGRAM(s) Oral every 6 hours PRN Mild Pain (1 - 3)        RADIOLOGY & ADDITIONAL TESTS:  < from: Xray Chest 1 View- PORTABLE-Routine (06.22.18 @ 05:44) >  History: Abnormal breath sounds postop follow-up exam    Similar appearance to prior exam 6/21/2018 with postoperative changes. No   focal or acute infiltrate. No pleural effusion. No pneumothorax.    < end of copied text > Transfer from     Operation / Date: TF TAVR Core 6/21/18     SUBJECTIVE ASSESSMENT:  89y Female seen and examined. Feels well, no acute complaints. Is ambulating, tolerating PO diet. Denies fever, chest pain, palpitations, SOB, abdominal pain, n/v.        Vital Signs Last 24 Hrs  T(C): 36.5 (22 Jun 2018 10:00), Max: 36.5 (22 Jun 2018 10:00)  T(F): 97.7 (22 Jun 2018 10:00), Max: 97.7 (22 Jun 2018 10:00)  HR: 60 (22 Jun 2018 10:00) (60 - 94)  BP: 138/78 (22 Jun 2018 10:00) (138/78 - 138/78)  BP(mean): 92 (22 Jun 2018 10:00) (92 - 92)  RR: 27 (22 Jun 2018 10:00) (13 - 35)  SpO2: 99% (22 Jun 2018 10:00) (93% - 100%)  I&O's Detail    21 Jun 2018 07:01  -  22 Jun 2018 07:00  --------------------------------------------------------  IN:    IV PiggyBack: 100 mL    sodium chloride 0.9%: 20 mL  Total IN: 120 mL    OUT:    Voided: 1150 mL  Total OUT: 1150 mL    Total NET: -1030 mL          CHEST TUBE:  NO  CANDY DRAIN:  No.  EPICARDIAL WIRES: No.  TIE DOWNS: No.  JENNINGS: No.    PHYSICAL EXAM:    General: Patient lying comfortably in bed, no acute distress     Neurological: Alert and oriented. No focal neurological deficits     Cardiovascular: S1S2, RRR, no murmurs appreciated on exam     Respiratory: Clear to ausculation bilaterally, no wheeze/rhonchi/rales    Gastrointestinal: Abdomen soft, non tender, non distended     Extremities: Warm and well perfused. trace b/l LE edema,  no calf tenderness     Vascular: 2+ Peripheral pulses b/l     Incision Sites: b/l groin: soft nonhematoma, +moderate ecchymosis    LABS:                        9.6    6.6   )-----------( 100      ( 22 Jun 2018 03:31 )             29.3       COUMADIN:  No    PT/INR - ( 22 Jun 2018 03:29 )   PT: 12.2 sec;   INR: 1.10          PTT - ( 22 Jun 2018 03:29 )  PTT:32.9 sec    06-22    140  |  102  |  20  ----------------------------<  106<H>  3.6   |  26  |  0.90    Ca    8.8      22 Jun 2018 03:29  Phos  3.7     06-22  Mg     1.8     06-22    TPro  6.0  /  Alb  3.5  /  TBili  0.6  /  DBili  x   /  AST  23  /  ALT  12  /  AlkPhos  39<L>  06-22          MEDICATIONS  (STANDING):  aspirin enteric coated 81 milliGRAM(s) Oral daily  atorvastatin 80 milliGRAM(s) Oral at bedtime  docusate sodium 100 milliGRAM(s) Oral three times a day  heparin  Injectable 5000 Unit(s) SubCutaneous every 8 hours  levothyroxine 88 MICROGram(s) Oral daily  metoprolol tartrate 12.5 milliGRAM(s) Oral every 12 hours  minocycline 100 milliGRAM(s) Oral daily  pantoprazole    Tablet 40 milliGRAM(s) Oral before breakfast  potassium chloride    Tablet ER 40 milliEquivalent(s) Oral once  senna 1 Tablet(s) Oral at bedtime    MEDICATIONS  (PRN):  acetaminophen   Tablet. 650 milliGRAM(s) Oral every 6 hours PRN Mild Pain (1 - 3)        RADIOLOGY & ADDITIONAL TESTS:  < from: Xray Chest 1 View- PORTABLE-Routine (06.22.18 @ 05:44) >  History: Abnormal breath sounds postop follow-up exam    Similar appearance to prior exam 6/21/2018 with postoperative changes. No   focal or acute infiltrate. No pleural effusion. No pneumothorax.    < end of copied text >

## 2018-06-22 NOTE — PHYSICAL THERAPY INITIAL EVALUATION ADULT - GENERAL OBSERVATIONS, REHAB EVAL
Patient received supine in bed with + tele, room air, daughter-in-law at bedside. Patient in no apparent distress.

## 2018-06-22 NOTE — PROGRESS NOTE ADULT - ASSESSMENT
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  On 5/3/18 patient underwent BAV with Dr. Singleton.  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 Arrived to ICU with right femoral TVP.   Post-op TTE done showing trace AR, severe AS MARY 0.8cm2, PPG 57mmhg, MPG 35mmHg.  Derm consulted for known h/o seborrheic dermatitis. Post-op EKG revealed NSR with QRS 98ms and beta-blockers initiated.  Placed on ASA 81mg QD only, no other anti-plts due to h/o GIB.  She was discharged home and presented on 6/21/18 for elective transfemoral TAVR (lesvia). Uneventful procedure. POD1 TVP out, transfer 9La.    A/P:  Neurovascular: No delirium. Pain well controlled with current regimen.  -Tylenol PRN Pain    Cardiovascular: Hemodynamically stable. HR controlled.  -Hx CAD s/p MAIA 2012, AS s/p BAV, now s/p TF TAVR (lesvia)  =f/u echo today  -Ekg remains narrow  -continue asa 81mg daily, no plavix secondary to GIB, metoprolol 12.5mg BID  -monitor HR/BP.tele    Respiratory: 02 Sat = 99% on RA.  -Encourage ambulation, C+DB and Use of IS 10x / hr while awake.  -CXR- stable    GI: Stable.  -protonix for GI PPX.  -Continue bowel regimen  -PO Diet.    Renal / : BUN/Cr 20/0.9  -Monitor renal function.  -Monitor I/O's.    Endocrine:    -A1c 4.9  -Hx hypothyroid, continue synthroid    Hematologic: H&H 9.6/29  -f/u AM CBC    ID: Afebrile, WBC  6.6  -complete periop abx  -Observe for SIRS/Sepsis Syndrome.    Prophylaxis:  -DVT prophylaxis with 5000 SubQ Heparin q8h.  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  On 5/3/18 patient underwent BAV with Dr. Singleton.  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 Arrived to ICU with right femoral TVP.   Post-op TTE done showing trace AR, severe AS MARY 0.8cm2, PPG 57mmhg, MPG 35mmHg.  Derm consulted for known h/o seborrheic dermatitis. Post-op EKG revealed NSR with QRS 98ms and beta-blockers initiated.  Placed on ASA 81mg QD only, no other anti-plts due to h/o GIB.  She was discharged home and presented on 6/21/18 for elective transfemoral TAVR (lesvia). Uneventful procedure. POD1 TVP out, transfer 9La.    A/P:  Neurovascular: No delirium. Pain well controlled with current regimen.  -Tylenol PRN Pain    Cardiovascular: Hemodynamically stable. HR controlled.  -Hx CAD s/p MAIA 2012, AS s/p BAV, now s/p TF TAVR (lesvia)  -f/u echo today, Ekg remains narrow  -continue asa 81mg daily, no plavix secondary to GIB, metoprolol 12.5mg BID  -monitor HR/BP.tele    Respiratory: 02 Sat = 99% on RA.  -Encourage ambulation, C+DB and Use of IS 10x / hr while awake.  -CXR- stable    GI: Stable.  -protonix for GI PPX.  -Continue bowel regimen  -PO Diet.  -Continue minocycline for hx MRSA colonization s/p partial bowel resection    Renal / : BUN/Cr 20/0.9  -Monitor renal function.  -Monitor I/O's.    Endocrine:    -A1c 4.9  -Hx hypothyroid, continue synthroid    Hematologic: H&H 9.6/29  -f/u AM CBC    ID: Afebrile, WBC  6.6  -complete periop abx  -Observe for SIRS/Sepsis Syndrome.    Prophylaxis:  -DVT prophylaxis with 5000 SubQ Heparin q8h.  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  On 5/3/18 patient underwent BAV with Dr. Singleton.  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 Arrived to ICU with right femoral TVP.   Post-op TTE done showing trace AR, severe AS MARY 0.8cm2, PPG 57mmhg, MPG 35mmHg.  Derm consulted for known h/o seborrheic dermatitis. Post-op EKG revealed NSR with QRS 98ms and beta-blockers initiated.  Placed on ASA 81mg QD only, no other anti-plts due to h/o GIB.  She was discharged home and presented on 6/21/18 for elective transfemoral TAVR (core). Uneventful procedure. POD1 TVP out, transfer 9La.    A/P:  Neurovascular: No delirium. Pain well controlled with current regimen.  -Tylenol PRN Pain    Cardiovascular: Hemodynamically stable. HR controlled.  -Hx CAD s/p MAIA 2012, AS s/p BAV, now s/p TF TAVR (core)   -f/u echo today, Ekg remains narrow  -f/u AM EKG  -continue asa 81mg daily, no plavix secondary to GIB, metoprolol 12.5mg BID  -monitor HR/BP.tele    Respiratory: 02 Sat = 99% on RA.  -Encourage ambulation, C+DB and Use of IS 10x / hr while awake.  -CXR- stable    GI: Stable.  -protonix for GI PPX.  -Continue bowel regimen  -PO Diet.  -Continue minocycline for hx MRSA colonization s/p partial bowel resection    Renal / : BUN/Cr 20/0.9  -Monitor renal function.  -Monitor I/O's.    Endocrine:    -A1c 4.9  -Hx hypothyroid, continue synthroid    Hematologic: H&H 9.6/29  -f/u AM CBC    ID: Afebrile, WBC  6.6  -complete periop abx  -Observe for SIRS/Sepsis Syndrome.    Prophylaxis:  -DVT prophylaxis with 5000 SubQ Heparin q8h.  -SCD's    Disposition:  -Home when medically ready

## 2018-06-22 NOTE — PHYSICAL THERAPY INITIAL EVALUATION ADULT - CRITERIA FOR SKILLED THERAPEUTIC INTERVENTIONS
rehab potential/therapy frequency/impairments found/anticipated discharge recommendation/anticipated equipment needs at discharge

## 2018-06-22 NOTE — PHYSICAL THERAPY INITIAL EVALUATION ADULT - ADDITIONAL COMMENTS
Patient reports that she lives alone in a "independent living" facility where meals are prepared for her. Plans to go to daughter's house post acute discharge for "a few days." Daughter's house has ~1 flight of stairs.

## 2018-06-23 ENCOUNTER — TRANSCRIPTION ENCOUNTER (OUTPATIENT)
Age: 83
End: 2018-06-23

## 2018-06-23 VITALS — TEMPERATURE: 97 F

## 2018-06-23 LAB
ANION GAP SERPL CALC-SCNC: 9 MMOL/L — SIGNIFICANT CHANGE UP (ref 5–17)
BASOPHILS NFR BLD AUTO: 0.2 % — SIGNIFICANT CHANGE UP (ref 0–2)
BUN SERPL-MCNC: 28 MG/DL — HIGH (ref 7–23)
CALCIUM SERPL-MCNC: 8.8 MG/DL — SIGNIFICANT CHANGE UP (ref 8.4–10.5)
CHLORIDE SERPL-SCNC: 105 MMOL/L — SIGNIFICANT CHANGE UP (ref 96–108)
CO2 SERPL-SCNC: 28 MMOL/L — SIGNIFICANT CHANGE UP (ref 22–31)
CREAT SERPL-MCNC: 1.08 MG/DL — SIGNIFICANT CHANGE UP (ref 0.5–1.3)
EOSINOPHIL NFR BLD AUTO: 0.8 % — SIGNIFICANT CHANGE UP (ref 0–6)
GLUCOSE SERPL-MCNC: 93 MG/DL — SIGNIFICANT CHANGE UP (ref 70–99)
HCT VFR BLD CALC: 30.9 % — LOW (ref 34.5–45)
HGB BLD-MCNC: 9.6 G/DL — LOW (ref 11.5–15.5)
LYMPHOCYTES # BLD AUTO: 21.7 % — SIGNIFICANT CHANGE UP (ref 13–44)
MAGNESIUM SERPL-MCNC: 1.8 MG/DL — SIGNIFICANT CHANGE UP (ref 1.6–2.6)
MCHC RBC-ENTMCNC: 28.5 PG — SIGNIFICANT CHANGE UP (ref 27–34)
MCHC RBC-ENTMCNC: 31.1 G/DL — LOW (ref 32–36)
MCV RBC AUTO: 91.7 FL — SIGNIFICANT CHANGE UP (ref 80–100)
MONOCYTES NFR BLD AUTO: 13.1 % — SIGNIFICANT CHANGE UP (ref 2–14)
NEUTROPHILS NFR BLD AUTO: 64.2 % — SIGNIFICANT CHANGE UP (ref 43–77)
PHOSPHATE SERPL-MCNC: 3.6 MG/DL — SIGNIFICANT CHANGE UP (ref 2.5–4.5)
PLATELET # BLD AUTO: 99 K/UL — LOW (ref 150–400)
POTASSIUM SERPL-MCNC: 4.4 MMOL/L — SIGNIFICANT CHANGE UP (ref 3.5–5.3)
POTASSIUM SERPL-SCNC: 4.4 MMOL/L — SIGNIFICANT CHANGE UP (ref 3.5–5.3)
RBC # BLD: 3.37 M/UL — LOW (ref 3.8–5.2)
RBC # FLD: 14.3 % — SIGNIFICANT CHANGE UP (ref 10.3–16.9)
SODIUM SERPL-SCNC: 142 MMOL/L — SIGNIFICANT CHANGE UP (ref 135–145)
WBC # BLD: 6.3 K/UL — SIGNIFICANT CHANGE UP (ref 3.8–10.5)
WBC # FLD AUTO: 6.3 K/UL — SIGNIFICANT CHANGE UP (ref 3.8–10.5)

## 2018-06-23 PROCEDURE — 83735 ASSAY OF MAGNESIUM: CPT

## 2018-06-23 PROCEDURE — 86923 COMPATIBILITY TEST ELECTRIC: CPT

## 2018-06-23 PROCEDURE — 86850 RBC ANTIBODY SCREEN: CPT

## 2018-06-23 PROCEDURE — 93005 ELECTROCARDIOGRAM TRACING: CPT

## 2018-06-23 PROCEDURE — 97161 PT EVAL LOW COMPLEX 20 MIN: CPT

## 2018-06-23 PROCEDURE — 85610 PROTHROMBIN TIME: CPT

## 2018-06-23 PROCEDURE — 84295 ASSAY OF SERUM SODIUM: CPT

## 2018-06-23 PROCEDURE — C1894: CPT

## 2018-06-23 PROCEDURE — 84132 ASSAY OF SERUM POTASSIUM: CPT

## 2018-06-23 PROCEDURE — C1887: CPT

## 2018-06-23 PROCEDURE — 86900 BLOOD TYPING SEROLOGIC ABO: CPT

## 2018-06-23 PROCEDURE — C1769: CPT

## 2018-06-23 PROCEDURE — 83880 ASSAY OF NATRIURETIC PEPTIDE: CPT

## 2018-06-23 PROCEDURE — 85025 COMPLETE CBC W/AUTO DIFF WBC: CPT

## 2018-06-23 PROCEDURE — 82330 ASSAY OF CALCIUM: CPT

## 2018-06-23 PROCEDURE — 80053 COMPREHEN METABOLIC PANEL: CPT

## 2018-06-23 PROCEDURE — 85027 COMPLETE CBC AUTOMATED: CPT

## 2018-06-23 PROCEDURE — 86901 BLOOD TYPING SEROLOGIC RH(D): CPT

## 2018-06-23 PROCEDURE — 82803 BLOOD GASES ANY COMBINATION: CPT

## 2018-06-23 PROCEDURE — L8699: CPT

## 2018-06-23 PROCEDURE — 83605 ASSAY OF LACTIC ACID: CPT

## 2018-06-23 PROCEDURE — C1889: CPT

## 2018-06-23 PROCEDURE — 36415 COLL VENOUS BLD VENIPUNCTURE: CPT

## 2018-06-23 PROCEDURE — C1760: CPT

## 2018-06-23 PROCEDURE — 80048 BASIC METABOLIC PNL TOTAL CA: CPT

## 2018-06-23 PROCEDURE — 84100 ASSAY OF PHOSPHORUS: CPT

## 2018-06-23 PROCEDURE — 71045 X-RAY EXAM CHEST 1 VIEW: CPT

## 2018-06-23 PROCEDURE — 85730 THROMBOPLASTIN TIME PARTIAL: CPT

## 2018-06-23 PROCEDURE — 93306 TTE W/DOPPLER COMPLETE: CPT

## 2018-06-23 RX ORDER — DOCUSATE SODIUM 100 MG
1 CAPSULE ORAL
Qty: 30 | Refills: 0 | OUTPATIENT
Start: 2018-06-23 | End: 2018-07-07

## 2018-06-23 RX ORDER — ALPRAZOLAM 0.25 MG
1 TABLET ORAL
Qty: 90 | Refills: 0 | OUTPATIENT
Start: 2018-06-23 | End: 2018-07-22

## 2018-06-23 RX ORDER — ATORVASTATIN CALCIUM 80 MG/1
1 TABLET, FILM COATED ORAL
Qty: 30 | Refills: 0 | OUTPATIENT
Start: 2018-06-23 | End: 2018-07-22

## 2018-06-23 RX ORDER — LEVOTHYROXINE SODIUM 125 MCG
1 TABLET ORAL
Qty: 30 | Refills: 0 | OUTPATIENT
Start: 2018-06-23 | End: 2018-07-22

## 2018-06-23 RX ORDER — ALPRAZOLAM 0.25 MG
1 TABLET ORAL
Qty: 0 | Refills: 0 | COMMUNITY

## 2018-06-23 RX ORDER — METOPROLOL TARTRATE 50 MG
0.5 TABLET ORAL
Qty: 30 | Refills: 0 | OUTPATIENT
Start: 2018-06-23 | End: 2018-07-22

## 2018-06-23 RX ORDER — ACETAMINOPHEN 500 MG
1 TABLET ORAL
Qty: 120 | Refills: 0 | OUTPATIENT
Start: 2018-06-23 | End: 2018-07-22

## 2018-06-23 RX ORDER — ALPRAZOLAM 0.25 MG
0.25 TABLET ORAL AT BEDTIME
Qty: 0 | Refills: 0 | Status: DISCONTINUED | OUTPATIENT
Start: 2018-06-23 | End: 2018-06-23

## 2018-06-23 RX ORDER — MINOCYCLINE HYDROCHLORIDE 45 MG/1
1 TABLET, EXTENDED RELEASE ORAL
Qty: 30 | Refills: 0 | OUTPATIENT
Start: 2018-06-23 | End: 2018-07-22

## 2018-06-23 RX ORDER — ASPIRIN/CALCIUM CARB/MAGNESIUM 324 MG
1 TABLET ORAL
Qty: 30 | Refills: 0 | OUTPATIENT
Start: 2018-06-23 | End: 2018-07-22

## 2018-06-23 RX ADMIN — HEPARIN SODIUM 5000 UNIT(S): 5000 INJECTION INTRAVENOUS; SUBCUTANEOUS at 06:59

## 2018-06-23 RX ADMIN — MINOCYCLINE HYDROCHLORIDE 100 MILLIGRAM(S): 45 TABLET, EXTENDED RELEASE ORAL at 10:54

## 2018-06-23 RX ADMIN — Medication 100 MILLIGRAM(S): at 06:59

## 2018-06-23 RX ADMIN — Medication 81 MILLIGRAM(S): at 10:54

## 2018-06-23 RX ADMIN — Medication 88 MICROGRAM(S): at 06:59

## 2018-06-23 RX ADMIN — PANTOPRAZOLE SODIUM 40 MILLIGRAM(S): 20 TABLET, DELAYED RELEASE ORAL at 06:59

## 2018-06-23 RX ADMIN — Medication 12.5 MILLIGRAM(S): at 06:59

## 2018-06-23 RX ADMIN — Medication 0.25 MILLIGRAM(S): at 05:00

## 2018-06-23 NOTE — DISCHARGE NOTE ADULT - PATIENT PORTAL LINK FT
You can access the [facility], offered by [facility], by registering with the following website: [email or web address]

## 2018-06-23 NOTE — PROGRESS NOTE ADULT - ASSESSMENT
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  On 5/3/18 patient underwent BAV with Dr. Singleton.  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 Arrived to ICU with right femoral TVP.   Post-op TTE done showing trace AR, severe AS MARY 0.8cm2, PPG 57mmhg, MPG 35mmHg.  Derm consulted for known h/o seborrheic dermatitis. Post-op EKG revealed NSR with QRS 98ms and beta-blockers initiated.  Placed on ASA 81mg QD only, no other anti-plts due to h/o GIB.  She was discharged home.    Patient presented on 6/21/18 for elective TAVR.  TAVR was uncomplicated and Core valve was used.  Post operative EF was 70%.  On POD 1 TVP was removed.  Pt had EKG showing NSR.  TTE performed on 6/22/18 showing EF 70%, no AI, mean AV gradient 7mmHg.  POD 2, repeat EKG showed NSR.  Pt restarted on all home medications and tolerating them.  Pt tolerating diet.  Groin incision sites clean without hematoma or tenderness.  Patient seen by Dr. Humphrey and appropriate for discharge on 6/23/18.

## 2018-06-23 NOTE — DISCHARGE NOTE ADULT - PLAN OF CARE
Recover from TAVR -Please follow up with Dr. Singleton in 1 week.  Our office will call with appointment on Monday.  The office is located at [facility], [facility], 4th floor. Call us with any questions  #[phone].    -Walk daily as tolerated and use your incentive spirometer every hour.    -No driving or strenuous activity/exercise until  cleared by your surgeon.    -Gently clean your incisions with anti-bacterial soap and water, pat  dry.  You may leave them open to air.    -Call your doctor if you have shortness of breath, chest pain not  relieved by pain medication, dizziness, fever >101.5, or increased  redness or drainage from incisions. Management of Cardiovascular disease Please follow up with Dr. Barry in 1-2 weeks.  Our office will schedule appointment and call patient on Monday to inform date and time.

## 2018-06-23 NOTE — DISCHARGE NOTE ADULT - CARE PROVIDERS DIRECT ADDRESSES
[email or web address]

## 2018-06-23 NOTE — DISCHARGE NOTE ADULT - MEDICATION SUMMARY - MEDICATIONS TO TAKE
I will START or STAY ON the medications listed below when I get home from the hospital:    acetaminophen 325 mg oral tablet  -- 1 tab(s) by mouth every 6 hours, As Needed -Mild Pain (1 - 3)   -- Indication: For Mild Pain    aspirin 81 mg oral tablet, chewable  -- 1 tab(s) by mouth once a day  -- Indication: For Heart Disease    atorvastatin 80 mg oral tablet  -- 1 tab(s) by mouth once a day (at bedtime)  -- Indication: For Cholesterol    ALPRAZolam 0.25 mg oral tablet  -- 1 tab(s) by mouth 3 times a day MDD:3  -- Indication: For Anxiety    metoprolol tartrate 25 mg oral tablet  -- 0.5 tab(s) by mouth every 12 hours   -- It is very important that you take or use this exactly as directed.  Do not skip doses or discontinue unless directed by your doctor.  May cause drowsiness.  Alcohol may intensify this effect.  Use care when operating dangerous machinery.  Some non-prescription drugs may aggravate your condition.  Read all labels carefully.  If a warning appears, check with your doctor before taking.  Take with food or milk.  This drug may impair the ability to drive or operate machinery.  Use care until you become familiar with its effects.    -- Indication: For Blood Pressure/Heart rate     clobetasol 0.05% topical cream  -- Apply on skin to affected area (scalp) once a day (at bedtime)    -- For external use only.    -- Indication: For Dermatology    Humira 40 mg/0.8 mL subcutaneous kit  -- Indication: For Rheumatoid Arthritis     docusate sodium 100 mg oral capsule  -- 1 cap(s) by mouth 2 times a day, As Needed for constipation.  Hold for loose stools   -- Indication: For Constipation    omeprazole 20 mg oral delayed release capsule  -- 1 cap(s) by mouth once a day  -- Indication: For GERD    minocycline 100 mg oral capsule  -- 1 cap(s) by mouth once a day  -- Indication: For Antibiotic    levothyroxine 88 mcg (0.088 mg) oral tablet  -- 1 tab(s) by mouth once a day  -- Indication: For Thyroid

## 2018-06-23 NOTE — DISCHARGE NOTE ADULT - CARE PLAN
Principal Discharge DX:	Aortic stenosis  Goal:	Recover from TAVR  Assessment and plan of treatment:	-Please follow up with Dr. Singleton in 1 week.  Our office will call with appointment on Monday.  The office is located at [facility], [facility], 4th floor. Call us with any questions  #[phone].    -Walk daily as tolerated and use your incentive spirometer every hour.    -No driving or strenuous activity/exercise until  cleared by your surgeon.    -Gently clean your incisions with anti-bacterial soap and water, pat  dry.  You may leave them open to air.    -Call your doctor if you have shortness of breath, chest pain not  relieved by pain medication, dizziness, fever >101.5, or increased  redness or drainage from incisions.  Secondary Diagnosis:	CAD (coronary artery disease)  Goal:	Management of Cardiovascular disease  Assessment and plan of treatment:	Please follow up with Dr. Barry in 1-2 weeks.  Our office will schedule appointment and call patient on Monday to inform date and time.

## 2018-06-23 NOTE — DISCHARGE NOTE ADULT - HOSPITAL COURSE
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  On 5/3/18 patient underwent BAV with Dr. Singleton.  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 Arrived to ICU with right femoral TVP.   Post-op TTE done showing trace AR, severe AS MARY 0.8cm2, PPG 57mmhg, MPG 35mmHg.  Derm consulted for known h/o seborrheic dermatitis. Post-op EKG revealed NSR with QRS 98ms and beta-blockers initiated.  Placed on ASA 81mg QD only, no other anti-plts due to h/o GIB.  She was discharged home.    Patient presented on 6/21/18 for elective TAVR.  TAVR was uncomplicated and Core valve was used.  Post operative EF was 70%.  On POD 1 TVP was removed.  Pt had EKG showing NSR.  TTE performed on 6/22/18 showing EF 70%, no AI, mean AV gradient 7mmHg.  POD 2, repeat EKG showed NSR.  Pt restarted on all home medications and tolerating them.  Pt tolerating diet.  Groin incision sites clean without hematoma or tenderness.  Patient seen by Dr. Humphrey and appropriate for discharge on 6/23/18. 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  On 5/3/18 patient underwent BAV with Dr. Singleton.  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 Arrived to ICU with right femoral TVP.   Post-op TTE done showing trace AR, severe AS MARY 0.8cm2, PPG 57mmhg, MPG 35mmHg.  Derm consulted for known h/o seborrheic dermatitis. Post-op EKG revealed NSR with QRS 98ms and beta-blockers initiated.  Placed on ASA 81mg QD only, no other anti-plts due to h/o GIB.  She was discharged home.  Patient presented on 6/21/18 for elective TAVR.  TAVR was uncomplicated and Core valve was used.  Post operative EF was 70%.  On POD 1 TVP was removed.  Pt had EKG showing NSR.  TTE performed on 6/22/18 showing EF 70%, no AI, mean AV gradient 7mmHg.  POD 2, repeat EKG showed NSR.  Pt restarted on all home medications and tolerating them.  Pt tolerating diet.  Groin incision sites clean without hematoma or tenderness.  Patient seen by Dr. Humphrey and appropriate for discharge on 6/23/18.

## 2018-06-23 NOTE — DISCHARGE NOTE ADULT - NS AS ACTIVITY OBS
Stairs allowed/Walking-Outdoors allowed/Walking-Indoors allowed/Showering allowed/No Heavy lifting/straining/Do not drive or operate machinery

## 2018-06-23 NOTE — PROGRESS NOTE ADULT - SUBJECTIVE AND OBJECTIVE BOX
Patient discussed on morning rounds with Dr. 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  On 5/3/18 patient underwent BAV with Dr. Singleton.  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 Arrived to ICU with right femoral TVP.   Post-op TTE done showing trace AR, severe AS MARY 0.8cm2, PPG 57mmhg, MPG 35mmHg.  Derm consulted for known h/o seborrheic dermatitis. Post-op EKG revealed NSR with QRS 98ms and beta-blockers initiated.  Placed on ASA 81mg QD only, no other anti-plts due to h/o GIB.  She was discharged home.    Patient presented on 6/21/18 for elective TAVR.  TAVR was uncomplicated and Core valve was used.  Post operative EF was 70%.  On POD 1 TVP was removed.  Pt had EKG showing NSR.  TTE performed on 6/22/18 showing EF 70%, no AI, mean AV gradient 7mmHg.  POD 2, repeat EKG showed NSR.  Pt restarted on all home medications and tolerating them.  Pt tolerating diet.  Groin incision sites clean without hematoma or tenderness.    No overnight events per nursing.  Patient has no complaints this AM.  She is requesting to be d/c home.  Denies pain/tenderness in groin sites.  Tolerating diet.  Denies nausea, abdominal discomfort.  Denies SOB, dizziness, palpitations.  Patient seen by Dr. Humphrey and felt to be appropriate for discharge home.       Vital Signs Last 24 Hrs  T(C): 36.2 (23 Jun 2018 10:00), Max: 36.4 (22 Jun 2018 14:00)  T(F): 97.2 (23 Jun 2018 10:00), Max: 97.6 (22 Jun 2018 14:00)  HR: 81 (23 Jun 2018 08:52) (68 - 82)  BP: 153/70 (23 Jun 2018 08:52) (122/82 - 169/74)  BP(mean): 100 (23 Jun 2018 08:52) (89 - 112)  RR: 16 (23 Jun 2018 08:52) (16 - 18)  SpO2: 97% (23 Jun 2018 08:52) (97% - 97%)    PHYSICAL EXAM:    General: 89 y/o female resting at bedside, NAD    Neurological: A&O x 3, no focal defects 	    Cardiovascular: RRR no M/R/G    Respiratory: CTA bilaterally    Gastrointestinal: soft, non-tender, non-distended     Extremities: no lower extremity edema    Vascular: warm, well perfused     Incision Sites: groins soft, no hematoma, no tenderness to palpation    LABS:                        9.6    6.3   )-----------( 99       ( 23 Jun 2018 05:58 )             30.9       COUMADIN:  No.          PT/INR - ( 22 Jun 2018 03:29 )   PT: 12.2 sec;   INR: 1.10        PTT - ( 22 Jun 2018 03:29 )  PTT:32.9 sec    06-23    142  |  105  |  28<H>  ----------------------------<  93  4.4   |  28  |  1.08    Ca    8.8      23 Jun 2018 05:58  Phos  3.6     06-23  Mg     1.8     06-23    TPro  6.0  /  Alb  3.5  /  TBili  0.6  /  DBili  x   /  AST  23  /  ALT  12  /  AlkPhos  39<L>  06-22    Discharge CXR:  < from: Xray Chest 1 View- PORTABLE-Routine (06.22.18 @ 05:44) >    Similar appearance to prior exam 6/21/2018 with postoperative changes. No   focal or acute infiltrate. No pleural effusion. No pneumothorax.    < end of copied text >      Discharge ECHO:    < from: Echocardiogram (06.22.18 @ 12:09) >  .The left ventricular ejection fraction   is   70%.Core valve seen in aortic   position.No   aortic regurgitation noted.The peak pressure gradient is 13 mmHg.The mean   pressure gradient is 7 mmHg.    < end of copied text >

## 2018-06-23 NOTE — DISCHARGE NOTE ADULT - CARE PROVIDER_API CALL
Crow Singleton), Cardiology; Interventional Cardiology  [address] [place] [address]  [address] [place] [address]  Phone: [phone]  Fax: [phone]    Jimmie Barry), Cardiovascular Disease; Internal Medicine  Cardiology [facility]  [address] [place] [id number]  Phone: [phone]  Fax: [phone]

## 2018-06-26 DIAGNOSIS — Z90.710 ACQUIRED ABSENCE OF BOTH CERVIX AND UTERUS: ICD-10-CM

## 2018-06-26 DIAGNOSIS — F41.9 ANXIETY DISORDER, UNSPECIFIED: ICD-10-CM

## 2018-06-26 DIAGNOSIS — K50.90 CROHN'S DISEASE, UNSPECIFIED, WITHOUT COMPLICATIONS: ICD-10-CM

## 2018-06-26 DIAGNOSIS — Z79.82 LONG TERM (CURRENT) USE OF ASPIRIN: ICD-10-CM

## 2018-06-26 DIAGNOSIS — E03.9 HYPOTHYROIDISM, UNSPECIFIED: ICD-10-CM

## 2018-06-26 DIAGNOSIS — I35.0 NONRHEUMATIC AORTIC (VALVE) STENOSIS: ICD-10-CM
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
## 2018-06-26 DIAGNOSIS — I25.2 OLD MYOCARDIAL INFARCTION: ICD-10-CM

## 2018-06-26 DIAGNOSIS — I50.32 CHRONIC DIASTOLIC (CONGESTIVE) HEART FAILURE: ICD-10-CM

## 2018-06-26 DIAGNOSIS — I25.10 ATHEROSCLEROTIC HEART DISEASE OF NATIVE CORONARY ARTERY WITHOUT ANGINA PECTORIS: ICD-10-CM

## 2018-06-26 DIAGNOSIS — Z85.3 PERSONAL HISTORY OF MALIGNANT NEOPLASM OF BREAST: ICD-10-CM

## 2018-06-26 DIAGNOSIS — I97.790 OTHER INTRAOPERATIVE CARDIAC FUNCTIONAL DISTURBANCES DURING CARDIAC SURGERY: ICD-10-CM

## 2018-06-26 DIAGNOSIS — M06.9 RHEUMATOID ARTHRITIS, UNSPECIFIED: ICD-10-CM

## 2018-06-26 DIAGNOSIS — I11.0 HYPERTENSIVE HEART DISEASE WITH HEART FAILURE: ICD-10-CM

## 2018-06-26 DIAGNOSIS — Z86.14 PERSONAL HISTORY OF METHICILLIN RESISTANT STAPHYLOCOCCUS AUREUS INFECTION: ICD-10-CM

## 2018-06-26 DIAGNOSIS — Z88.8 ALLERGY STATUS TO OTHER DRUGS, MEDICAMENTS AND BIOLOGICAL SUBSTANCES: ICD-10-CM

## 2018-06-26 DIAGNOSIS — I44.7 LEFT BUNDLE-BRANCH BLOCK, UNSPECIFIED: ICD-10-CM

## 2018-06-26 DIAGNOSIS — K21.9 GASTRO-ESOPHAGEAL REFLUX DISEASE WITHOUT ESOPHAGITIS: ICD-10-CM

## 2018-06-26 DIAGNOSIS — Z95.5 PRESENCE OF CORONARY ANGIOPLASTY IMPLANT AND GRAFT: ICD-10-CM

## 2018-06-26 DIAGNOSIS — Z90.49 ACQUIRED ABSENCE OF OTHER SPECIFIED PARTS OF DIGESTIVE TRACT: ICD-10-CM

## 2018-07-09 ENCOUNTER — APPOINTMENT (OUTPATIENT)
Dept: CARDIOTHORACIC SURGERY | Facility: CLINIC | Age: 83
End: 2018-07-09
Payer: MEDICARE

## 2018-07-09 VITALS
HEART RATE: 66 BPM | SYSTOLIC BLOOD PRESSURE: 205 MMHG | WEIGHT: 137 LBS | BODY MASS INDEX: 26.76 KG/M2 | RESPIRATION RATE: 18 BRPM | TEMPERATURE: 97.3 F | DIASTOLIC BLOOD PRESSURE: 91 MMHG | OXYGEN SATURATION: 99 %

## 2018-07-09 PROCEDURE — 99215 OFFICE O/P EST HI 40 MIN: CPT | Mod: 24

## 2018-07-10 RX ORDER — METOPROLOL TARTRATE 25 MG/1
25 TABLET, FILM COATED ORAL TWICE DAILY
Qty: 90 | Refills: 1 | Status: ACTIVE | COMMUNITY

## 2018-07-12 ENCOUNTER — APPOINTMENT (OUTPATIENT)
Dept: HEART AND VASCULAR | Facility: CLINIC | Age: 83
End: 2018-07-12

## 2018-07-16 ENCOUNTER — APPOINTMENT (OUTPATIENT)
Dept: OPHTHALMOLOGY | Facility: CLINIC | Age: 83
End: 2018-07-16
Payer: MEDICARE

## 2018-07-16 PROCEDURE — 67028 INJECTION EYE DRUG: CPT | Mod: 50

## 2018-07-16 PROCEDURE — 92004 COMPRE OPH EXAM NEW PT 1/>: CPT | Mod: 25

## 2018-07-16 PROCEDURE — 92225: CPT | Mod: RT

## 2018-07-16 PROCEDURE — 92134 CPTRZ OPH DX IMG PST SGM RTA: CPT

## 2018-07-29 ENCOUNTER — FORM ENCOUNTER (OUTPATIENT)
Age: 83
End: 2018-07-29

## 2018-07-30 ENCOUNTER — APPOINTMENT (OUTPATIENT)
Dept: CARDIOTHORACIC SURGERY | Facility: CLINIC | Age: 83
End: 2018-07-30
Payer: MEDICARE

## 2018-07-30 ENCOUNTER — OUTPATIENT (OUTPATIENT)
Dept: OUTPATIENT SERVICES | Facility: HOSPITAL | Age: 83
LOS: 1 days | End: 2018-07-30
Payer: MEDICARE

## 2018-07-30 VITALS
TEMPERATURE: 97.1 F | WEIGHT: 136 LBS | BODY MASS INDEX: 26.56 KG/M2 | DIASTOLIC BLOOD PRESSURE: 79 MMHG | RESPIRATION RATE: 18 BRPM | SYSTOLIC BLOOD PRESSURE: 183 MMHG | HEART RATE: 59 BPM | OXYGEN SATURATION: 99 %

## 2018-07-30 DIAGNOSIS — Z98.890 OTHER SPECIFIED POSTPROCEDURAL STATES: Chronic | ICD-10-CM

## 2018-07-30 DIAGNOSIS — Z90.710 ACQUIRED ABSENCE OF BOTH CERVIX AND UTERUS: Chronic | ICD-10-CM

## 2018-07-30 DIAGNOSIS — I35.9 NONRHEUMATIC AORTIC VALVE DISORDER, UNSPECIFIED: ICD-10-CM

## 2018-07-30 DIAGNOSIS — Z95.5 PRESENCE OF CORONARY ANGIOPLASTY IMPLANT AND GRAFT: Chronic | ICD-10-CM

## 2018-07-30 PROBLEM — K59.00 CONSTIPATION, UNSPECIFIED: Chronic | Status: ACTIVE | Noted: 2018-05-02

## 2018-07-30 PROBLEM — I25.10 ATHEROSCLEROTIC HEART DISEASE OF NATIVE CORONARY ARTERY WITHOUT ANGINA PECTORIS: Chronic | Status: ACTIVE | Noted: 2018-05-02

## 2018-07-30 PROBLEM — I35.0 NONRHEUMATIC AORTIC (VALVE) STENOSIS: Chronic | Status: ACTIVE | Noted: 2018-05-02

## 2018-07-30 PROBLEM — K50.90 CROHN'S DISEASE, UNSPECIFIED, WITHOUT COMPLICATIONS: Chronic | Status: ACTIVE | Noted: 2018-05-02

## 2018-07-30 PROBLEM — C50.919 MALIGNANT NEOPLASM OF UNSPECIFIED SITE OF UNSPECIFIED FEMALE BREAST: Chronic | Status: ACTIVE | Noted: 2018-05-02

## 2018-07-30 PROBLEM — E03.9 HYPOTHYROIDISM, UNSPECIFIED: Chronic | Status: ACTIVE | Noted: 2018-05-02

## 2018-07-30 PROBLEM — Z22.322 CARRIER OR SUSPECTED CARRIER OF METHICILLIN RESISTANT STAPHYLOCOCCUS AUREUS: Chronic | Status: ACTIVE | Noted: 2018-05-02

## 2018-07-30 PROBLEM — F41.9 ANXIETY DISORDER, UNSPECIFIED: Chronic | Status: ACTIVE | Noted: 2018-05-02

## 2018-07-30 LAB
ALBUMIN SERPL ELPH-MCNC: 3.9 G/DL — SIGNIFICANT CHANGE UP (ref 3.3–5)
ALP SERPL-CCNC: 55 U/L — SIGNIFICANT CHANGE UP (ref 40–120)
ALT FLD-CCNC: 14 U/L — SIGNIFICANT CHANGE UP (ref 10–45)
ANION GAP SERPL CALC-SCNC: 13 MMOL/L — SIGNIFICANT CHANGE UP (ref 5–17)
AST SERPL-CCNC: 25 U/L — SIGNIFICANT CHANGE UP (ref 10–40)
BASOPHILS NFR BLD AUTO: 0.4 % — SIGNIFICANT CHANGE UP (ref 0–2)
BILIRUB SERPL-MCNC: 0.5 MG/DL — SIGNIFICANT CHANGE UP (ref 0.2–1.2)
BUN SERPL-MCNC: 19 MG/DL — SIGNIFICANT CHANGE UP (ref 7–23)
CALCIUM SERPL-MCNC: 9 MG/DL — SIGNIFICANT CHANGE UP (ref 8.4–10.5)
CHLORIDE SERPL-SCNC: 104 MMOL/L — SIGNIFICANT CHANGE UP (ref 96–108)
CO2 SERPL-SCNC: 26 MMOL/L — SIGNIFICANT CHANGE UP (ref 22–31)
CREAT SERPL-MCNC: 0.96 MG/DL — SIGNIFICANT CHANGE UP (ref 0.5–1.3)
EOSINOPHIL NFR BLD AUTO: 1.5 % — SIGNIFICANT CHANGE UP (ref 0–6)
GLUCOSE SERPL-MCNC: 81 MG/DL — SIGNIFICANT CHANGE UP (ref 70–99)
HCT VFR BLD CALC: 36 % — SIGNIFICANT CHANGE UP (ref 34.5–45)
HGB BLD-MCNC: 11.4 G/DL — LOW (ref 11.5–15.5)
LYMPHOCYTES # BLD AUTO: 26.1 % — SIGNIFICANT CHANGE UP (ref 13–44)
MCHC RBC-ENTMCNC: 28.6 PG — SIGNIFICANT CHANGE UP (ref 27–34)
MCHC RBC-ENTMCNC: 31.7 G/DL — LOW (ref 32–36)
MCV RBC AUTO: 90.5 FL — SIGNIFICANT CHANGE UP (ref 80–100)
MONOCYTES NFR BLD AUTO: 11.7 % — SIGNIFICANT CHANGE UP (ref 2–14)
NEUTROPHILS NFR BLD AUTO: 60.3 % — SIGNIFICANT CHANGE UP (ref 43–77)
NT-PROBNP SERPL-SCNC: 2331 PG/ML — HIGH (ref 0–300)
PLATELET # BLD AUTO: 135 K/UL — LOW (ref 150–400)
POTASSIUM SERPL-MCNC: 4.3 MMOL/L — SIGNIFICANT CHANGE UP (ref 3.5–5.3)
POTASSIUM SERPL-SCNC: 4.3 MMOL/L — SIGNIFICANT CHANGE UP (ref 3.5–5.3)
PROT SERPL-MCNC: 7.1 G/DL — SIGNIFICANT CHANGE UP (ref 6–8.3)
RBC # BLD: 3.98 M/UL — SIGNIFICANT CHANGE UP (ref 3.8–5.2)
RBC # FLD: 13.7 % — SIGNIFICANT CHANGE UP (ref 10.3–16.9)
SODIUM SERPL-SCNC: 143 MMOL/L — SIGNIFICANT CHANGE UP (ref 135–145)
WBC # BLD: 5.3 K/UL — SIGNIFICANT CHANGE UP (ref 3.8–10.5)
WBC # FLD AUTO: 5.3 K/UL — SIGNIFICANT CHANGE UP (ref 3.8–10.5)

## 2018-07-30 PROCEDURE — 36415 COLL VENOUS BLD VENIPUNCTURE: CPT | Mod: 78

## 2018-07-30 PROCEDURE — 93306 TTE W/DOPPLER COMPLETE: CPT

## 2018-07-30 PROCEDURE — 99214 OFFICE O/P EST MOD 30 MIN: CPT | Mod: 24

## 2018-07-30 PROCEDURE — 85025 COMPLETE CBC W/AUTO DIFF WBC: CPT

## 2018-07-30 PROCEDURE — 93306 TTE W/DOPPLER COMPLETE: CPT | Mod: 26

## 2018-07-30 PROCEDURE — 93010 ELECTROCARDIOGRAM REPORT: CPT

## 2018-07-30 PROCEDURE — 93005 ELECTROCARDIOGRAM TRACING: CPT

## 2018-07-30 PROCEDURE — 83880 ASSAY OF NATRIURETIC PEPTIDE: CPT

## 2018-07-30 PROCEDURE — 36415 COLL VENOUS BLD VENIPUNCTURE: CPT

## 2018-07-30 PROCEDURE — 80053 COMPREHEN METABOLIC PANEL: CPT

## 2018-07-31 RX ORDER — ATORVASTATIN CALCIUM 20 MG/1
20 TABLET, FILM COATED ORAL
Qty: 30 | Refills: 1 | Status: ACTIVE | COMMUNITY
Start: 1900-01-01 | End: 1900-01-01

## 2018-08-24 ENCOUNTER — APPOINTMENT (OUTPATIENT)
Dept: OPHTHALMOLOGY | Facility: CLINIC | Age: 83
End: 2018-08-24
Payer: MEDICARE

## 2018-08-24 PROCEDURE — 92134 CPTRZ OPH DX IMG PST SGM RTA: CPT

## 2018-08-24 PROCEDURE — 67028 INJECTION EYE DRUG: CPT | Mod: 50

## 2018-09-07 ENCOUNTER — APPOINTMENT (OUTPATIENT)
Dept: INTERVENTIONAL RADIOLOGY/VASCULAR | Facility: HOSPITAL | Age: 83
End: 2018-09-07

## 2018-09-07 ENCOUNTER — OUTPATIENT (OUTPATIENT)
Dept: OUTPATIENT SERVICES | Facility: HOSPITAL | Age: 83
LOS: 1 days | End: 2018-09-07
Payer: MEDICARE

## 2018-09-07 DIAGNOSIS — Z98.890 OTHER SPECIFIED POSTPROCEDURAL STATES: Chronic | ICD-10-CM

## 2018-09-07 DIAGNOSIS — Z95.5 PRESENCE OF CORONARY ANGIOPLASTY IMPLANT AND GRAFT: Chronic | ICD-10-CM

## 2018-09-07 DIAGNOSIS — Z90.710 ACQUIRED ABSENCE OF BOTH CERVIX AND UTERUS: Chronic | ICD-10-CM

## 2018-09-07 PROCEDURE — C1726: CPT

## 2018-09-07 PROCEDURE — 22514 PERQ VERTEBRAL AUGMENTATION: CPT

## 2018-10-01 ENCOUNTER — APPOINTMENT (OUTPATIENT)
Dept: OPHTHALMOLOGY | Facility: CLINIC | Age: 83
End: 2018-10-01
Payer: MEDICARE

## 2018-10-01 PROCEDURE — 92134 CPTRZ OPH DX IMG PST SGM RTA: CPT

## 2018-10-01 PROCEDURE — 67028 INJECTION EYE DRUG: CPT | Mod: 50,RT

## 2018-11-30 ENCOUNTER — APPOINTMENT (OUTPATIENT)
Dept: OPHTHALMOLOGY | Facility: CLINIC | Age: 83
End: 2018-11-30
Payer: MEDICARE

## 2018-11-30 PROCEDURE — 67028 INJECTION EYE DRUG: CPT | Mod: 50

## 2018-11-30 PROCEDURE — 92134 CPTRZ OPH DX IMG PST SGM RTA: CPT

## 2019-02-07 ENCOUNTER — APPOINTMENT (OUTPATIENT)
Dept: OPHTHALMOLOGY | Facility: CLINIC | Age: 84
End: 2019-02-07

## 2019-02-11 ENCOUNTER — APPOINTMENT (OUTPATIENT)
Dept: OPHTHALMOLOGY | Facility: CLINIC | Age: 84
End: 2019-02-11
Payer: MEDICARE

## 2019-02-11 DIAGNOSIS — H43.813 VITREOUS DEGENERATION, BILATERAL: ICD-10-CM

## 2019-02-11 DIAGNOSIS — Z96.1 PRESENCE OF INTRAOCULAR LENS: ICD-10-CM

## 2019-02-11 DIAGNOSIS — H35.3231 EXUDATIVE AGE-RELATED MACULAR DEGENERATION, BILATERAL, WITH ACTIVE CHOROIDAL NEOVASCULARIZATION: ICD-10-CM

## 2019-02-11 DIAGNOSIS — H01.006 UNSPECIFIED BLEPHARITIS RIGHT EYE, UNSPECIFIED EYELID: ICD-10-CM

## 2019-02-11 DIAGNOSIS — H01.003 UNSPECIFIED BLEPHARITIS RIGHT EYE, UNSPECIFIED EYELID: ICD-10-CM

## 2019-02-11 PROCEDURE — 67028 INJECTION EYE DRUG: CPT | Mod: RT

## 2019-02-11 PROCEDURE — 92012 INTRM OPH EXAM EST PATIENT: CPT | Mod: 25

## 2019-02-11 PROCEDURE — 92134 CPTRZ OPH DX IMG PST SGM RTA: CPT

## 2019-06-06 ENCOUNTER — NON-APPOINTMENT (OUTPATIENT)
Age: 84
End: 2019-06-06

## 2019-06-07 ENCOUNTER — APPOINTMENT (OUTPATIENT)
Dept: OPHTHALMOLOGY | Facility: CLINIC | Age: 84
End: 2019-06-07
Payer: MEDICARE

## 2019-06-07 PROCEDURE — 67028 INJECTION EYE DRUG: CPT | Mod: 50

## 2019-06-07 PROCEDURE — 92012 INTRM OPH EXAM EST PATIENT: CPT | Mod: 25

## 2019-06-07 PROCEDURE — 92134 CPTRZ OPH DX IMG PST SGM RTA: CPT

## 2019-09-23 NOTE — ED ADULT NURSE NOTE - NS ED NURSE LEVEL OF CONSCIOUSNESS AFFECT
Calm Related to inability to meet sufficient energy requirements in setting of dementia with increasing lethargy and (new Pneumomediastinum w/ subcutaneous emphysema,(suspected tracheal perforation)

## 2019-10-04 ENCOUNTER — APPOINTMENT (OUTPATIENT)
Dept: OPHTHALMOLOGY | Facility: CLINIC | Age: 84
End: 2019-10-04
Payer: MEDICARE

## 2019-10-04 ENCOUNTER — NON-APPOINTMENT (OUTPATIENT)
Age: 84
End: 2019-10-04

## 2019-10-04 PROCEDURE — 92134 CPTRZ OPH DX IMG PST SGM RTA: CPT

## 2019-10-04 PROCEDURE — 67028 INJECTION EYE DRUG: CPT | Mod: 50

## 2019-10-04 PROCEDURE — 92012 INTRM OPH EXAM EST PATIENT: CPT | Mod: 25

## 2020-01-16 ENCOUNTER — NON-APPOINTMENT (OUTPATIENT)
Age: 85
End: 2020-01-16

## 2020-01-16 ENCOUNTER — APPOINTMENT (OUTPATIENT)
Dept: OPHTHALMOLOGY | Facility: CLINIC | Age: 85
End: 2020-01-16
Payer: MEDICARE

## 2020-01-16 PROCEDURE — 92134 CPTRZ OPH DX IMG PST SGM RTA: CPT

## 2020-01-16 PROCEDURE — 67028 INJECTION EYE DRUG: CPT | Mod: 50

## 2020-05-06 ENCOUNTER — APPOINTMENT (OUTPATIENT)
Dept: OPHTHALMOLOGY | Facility: CLINIC | Age: 85
End: 2020-05-06

## 2020-06-25 ENCOUNTER — NEW PATIENT (OUTPATIENT)
Dept: URBAN - METROPOLITAN AREA CLINIC 19 | Facility: CLINIC | Age: 85
End: 2020-06-25

## 2020-06-25 VITALS — HEIGHT: 55 IN

## 2020-06-25 DIAGNOSIS — Z96.1: ICD-10-CM

## 2020-06-25 DIAGNOSIS — H35.3232: ICD-10-CM

## 2020-06-25 PROCEDURE — 92134 CPTRZ OPH DX IMG PST SGM RTA: CPT

## 2020-06-25 PROCEDURE — 99204 OFFICE O/P NEW MOD 45 MIN: CPT | Mod: 25

## 2020-06-25 PROCEDURE — 92202 OPSCPY EXTND ON/MAC DRAW: CPT

## 2020-06-25 PROCEDURE — PF5 LUCENTIS PREFILLED SYRINGE

## 2020-06-25 PROCEDURE — 67028 INJECTION EYE DRUG: CPT

## 2020-06-25 ASSESSMENT — TONOMETRY
OS_IOP_MMHG: 5
OD_IOP_MMHG: 7

## 2020-06-25 ASSESSMENT — VISUAL ACUITY
OS_CC: 20/100
OD_CC: 20/80

## 2020-11-03 ENCOUNTER — FOLLOW UP (OUTPATIENT)
Dept: URBAN - METROPOLITAN AREA CLINIC 19 | Facility: CLINIC | Age: 85
End: 2020-11-03

## 2020-11-03 VITALS — HEIGHT: 55 IN

## 2020-11-03 DIAGNOSIS — H35.3232: ICD-10-CM

## 2020-11-03 PROCEDURE — 92134 CPTRZ OPH DX IMG PST SGM RTA: CPT

## 2020-11-03 PROCEDURE — PF5 LUCENTIS PREFILLED SYRINGE

## 2020-11-03 PROCEDURE — 92202 OPSCPY EXTND ON/MAC DRAW: CPT

## 2020-11-03 PROCEDURE — 92014 COMPRE OPH EXAM EST PT 1/>: CPT | Mod: 25

## 2020-11-03 PROCEDURE — 67028 INJECTION EYE DRUG: CPT

## 2020-11-03 ASSESSMENT — TONOMETRY
OD_IOP_MMHG: 8
OS_IOP_MMHG: 7
OD_IOP_MMHG: 9
OS_IOP_MMHG: 5

## 2020-11-03 ASSESSMENT — VISUAL ACUITY
OS_CC: 20/100
OD_CC: 20/125

## 2021-02-23 ENCOUNTER — FOLLOW UP (OUTPATIENT)
Dept: URBAN - METROPOLITAN AREA CLINIC 19 | Facility: CLINIC | Age: 86
End: 2021-02-23

## 2021-02-23 DIAGNOSIS — H35.3232: ICD-10-CM

## 2021-02-23 PROCEDURE — 92014 COMPRE OPH EXAM EST PT 1/>: CPT | Mod: 25

## 2021-02-23 PROCEDURE — PF5 LUCENTIS PREFILLED SYRINGE

## 2021-02-23 PROCEDURE — 92134 CPTRZ OPH DX IMG PST SGM RTA: CPT

## 2021-02-23 PROCEDURE — 67028 INJECTION EYE DRUG: CPT

## 2021-02-23 PROCEDURE — 92202 OPSCPY EXTND ON/MAC DRAW: CPT

## 2021-02-23 ASSESSMENT — TONOMETRY
OS_IOP_MMHG: 4
OD_IOP_MMHG: 6

## 2021-02-23 ASSESSMENT — VISUAL ACUITY
OS_CC: 20/60-1
OD_CC: 20/125+1

## 2021-05-25 ENCOUNTER — FOLLOW UP (OUTPATIENT)
Dept: URBAN - METROPOLITAN AREA CLINIC 19 | Facility: CLINIC | Age: 86
End: 2021-05-25

## 2021-05-25 DIAGNOSIS — H35.3231: ICD-10-CM

## 2021-05-25 PROCEDURE — PF5 LUCENTIS PREFILLED SYRINGE

## 2021-05-25 PROCEDURE — 92014 COMPRE OPH EXAM EST PT 1/>: CPT | Mod: 25

## 2021-05-25 PROCEDURE — 92202 OPSCPY EXTND ON/MAC DRAW: CPT

## 2021-05-25 PROCEDURE — 67028 INJECTION EYE DRUG: CPT

## 2021-05-25 PROCEDURE — 92134 CPTRZ OPH DX IMG PST SGM RTA: CPT

## 2021-05-25 ASSESSMENT — VISUAL ACUITY
OS_CC: 20/100-2
OD_CC: 20/125-1

## 2021-05-25 ASSESSMENT — TONOMETRY
OS_IOP_MMHG: 10
OD_IOP_MMHG: 12

## 2022-02-18 NOTE — ASU PREOP CHECKLIST - SIDE RAILS UP
n/a Glycopyrrolate Pregnancy And Lactation Text: This medication is Pregnancy Category B and is considered safe during pregnancy. It is unknown if it is excreted breast milk.

## 2025-05-06 NOTE — ED PROVIDER NOTE - NEUROLOGICAL, MLM
Patient called in to schedule a TCM appointment call was transferred to the office to assist.    Alert and oriented, no focal deficits, no motor or sensory deficits.

## (undated) RX ORDER — ERYTHROMYCIN 5 MG/G: 1/4 OINTMENT OPHTHALMIC